# Patient Record
Sex: MALE | Race: WHITE | NOT HISPANIC OR LATINO | Employment: OTHER | URBAN - METROPOLITAN AREA
[De-identification: names, ages, dates, MRNs, and addresses within clinical notes are randomized per-mention and may not be internally consistent; named-entity substitution may affect disease eponyms.]

---

## 2023-06-15 PROBLEM — H52.223 REGULAR ASTIGMATISM OF BOTH EYES WITH PRESBYOPIA: Status: ACTIVE | Noted: 2023-06-15

## 2023-06-15 PROBLEM — N40.0 BENIGN ENLARGEMENT OF PROSTATE: Status: ACTIVE | Noted: 2023-06-15

## 2023-06-15 PROBLEM — E55.9 HYPOVITAMINOSIS D: Status: ACTIVE | Noted: 2023-06-15

## 2023-06-15 PROBLEM — M54.50 LOW BACK PAIN: Status: ACTIVE | Noted: 2023-06-15

## 2023-06-15 PROBLEM — M10.9 GOUT: Status: ACTIVE | Noted: 2023-06-15

## 2023-06-15 PROBLEM — S83.232A COMPLEX TEAR OF MEDIAL MENISCUS OF LEFT KNEE AS CURRENT INJURY: Status: ACTIVE | Noted: 2023-06-15

## 2023-06-15 PROBLEM — M65.30 TRIGGER FINGER: Status: ACTIVE | Noted: 2023-06-15

## 2023-06-15 PROBLEM — M65.312 TRIGGER FINGER OF LEFT THUMB: Status: ACTIVE | Noted: 2023-06-15

## 2023-06-15 PROBLEM — S90.869A TICK BITE OF FOOT: Status: ACTIVE | Noted: 2023-06-15

## 2023-06-15 PROBLEM — R97.20 ELEVATED PROSTATE SPECIFIC ANTIGEN (PSA): Status: ACTIVE | Noted: 2023-06-15

## 2023-06-15 PROBLEM — E78.5 HYPERLIPIDEMIA: Status: ACTIVE | Noted: 2023-06-15

## 2023-06-15 PROBLEM — M19.049 ARTHRITIS, DEGENERATIVE, LOCALIZED, PRIMARY, HAND: Status: ACTIVE | Noted: 2023-06-15

## 2023-06-15 PROBLEM — H57.12 PAIN AROUND LEFT EYE: Status: ACTIVE | Noted: 2023-06-15

## 2023-06-15 PROBLEM — R93.1 ELEVATED CORONARY ARTERY CALCIUM SCORE: Status: ACTIVE | Noted: 2023-06-15

## 2023-06-15 PROBLEM — M65.341 ACQUIRED TRIGGER FINGER OF RIGHT RING FINGER: Status: ACTIVE | Noted: 2023-06-15

## 2023-06-15 PROBLEM — U07.1 COVID-19: Status: ACTIVE | Noted: 2023-06-15

## 2023-06-15 PROBLEM — E11.9 TYPE 2 DIABETES MELLITUS (MULTI): Status: ACTIVE | Noted: 2023-06-15

## 2023-06-15 PROBLEM — R51.9 HEADACHE: Status: ACTIVE | Noted: 2023-06-15

## 2023-06-15 PROBLEM — K21.9 ESOPHAGEAL REFLUX: Status: ACTIVE | Noted: 2023-06-15

## 2023-06-15 PROBLEM — M72.0 DUPUYTREN'S DISEASE: Status: ACTIVE | Noted: 2023-06-15

## 2023-06-15 PROBLEM — N52.9 ED (ERECTILE DYSFUNCTION): Status: ACTIVE | Noted: 2023-06-15

## 2023-06-15 PROBLEM — M65.30 TRIGGER FINGER, ACQUIRED: Status: ACTIVE | Noted: 2023-06-15

## 2023-06-15 PROBLEM — M25.562 KNEE PAIN, LEFT: Status: ACTIVE | Noted: 2023-06-15

## 2023-06-15 PROBLEM — I10 HYPERTENSION: Status: ACTIVE | Noted: 2023-06-15

## 2023-06-15 PROBLEM — H81.10 BPV (BENIGN POSITIONAL VERTIGO): Status: ACTIVE | Noted: 2023-06-15

## 2023-06-15 PROBLEM — H25.13 CATARACT, NUCLEAR SCLEROTIC, BOTH EYES: Status: ACTIVE | Noted: 2023-06-15

## 2023-06-15 PROBLEM — R06.02 SHORTNESS OF BREATH: Status: ACTIVE | Noted: 2023-06-15

## 2023-06-15 PROBLEM — M25.539 WRIST PAIN: Status: ACTIVE | Noted: 2023-06-15

## 2023-06-15 PROBLEM — H04.123 DRY EYE SYNDROME, BILATERAL: Status: ACTIVE | Noted: 2023-06-15

## 2023-06-15 PROBLEM — H43.819 VITREOUS DEGENERATION: Status: ACTIVE | Noted: 2023-06-15

## 2023-06-15 PROBLEM — M19.90 ARTHRITIS: Status: ACTIVE | Noted: 2023-06-15

## 2023-06-15 PROBLEM — M79.646 THUMB PAIN: Status: ACTIVE | Noted: 2023-06-15

## 2023-06-15 PROBLEM — F43.21 GRIEF: Status: ACTIVE | Noted: 2023-06-15

## 2023-06-15 PROBLEM — M79.644 PAIN IN RIGHT FINGER(S): Status: ACTIVE | Noted: 2023-06-15
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
## 2023-06-15 PROBLEM — R49.0 HOARSE VOICE QUALITY: Status: ACTIVE | Noted: 2023-06-15

## 2023-06-15 PROBLEM — M25.561 RIGHT KNEE PAIN: Status: ACTIVE | Noted: 2023-06-15

## 2023-06-15 PROBLEM — M25.641: Status: ACTIVE | Noted: 2023-06-15

## 2023-06-15 PROBLEM — H52.4 REGULAR ASTIGMATISM OF BOTH EYES WITH PRESBYOPIA: Status: ACTIVE | Noted: 2023-06-15

## 2023-06-15 PROBLEM — H52.03 HYPEROPIA OF BOTH EYES: Status: ACTIVE | Noted: 2023-06-15

## 2023-06-15 PROBLEM — H25.10 NUCLEAR SCLEROSIS: Status: ACTIVE | Noted: 2023-06-15

## 2023-06-15 PROBLEM — N18.31 CHRONIC KIDNEY DISEASE, STAGE 3A (MULTI): Status: ACTIVE | Noted: 2023-06-15

## 2023-06-15 RX ORDER — PEN NEEDLE, DIABETIC 31 GX5/16"
NEEDLE, DISPOSABLE MISCELLANEOUS
COMMUNITY
Start: 2023-04-04 | End: 2023-10-27

## 2023-06-15 RX ORDER — INSULIN GLARGINE 300 U/ML
35 INJECTION, SOLUTION SUBCUTANEOUS EVERY MORNING
COMMUNITY
Start: 2017-03-29 | End: 2023-06-20 | Stop reason: SDUPTHER

## 2023-06-15 RX ORDER — AMLODIPINE BESYLATE 10 MG/1
1 TABLET ORAL DAILY
COMMUNITY
Start: 2012-04-30 | End: 2023-06-20 | Stop reason: SDUPTHER

## 2023-06-15 RX ORDER — GABAPENTIN 100 MG/1
1 CAPSULE ORAL NIGHTLY
COMMUNITY
Start: 2021-09-22 | End: 2023-06-20 | Stop reason: ALTCHOICE

## 2023-06-15 RX ORDER — ATENOLOL 50 MG/1
1 TABLET ORAL DAILY
COMMUNITY
Start: 2012-04-30 | End: 2023-06-20 | Stop reason: SDUPTHER

## 2023-06-15 RX ORDER — OMEPRAZOLE 40 MG/1
40 CAPSULE, DELAYED RELEASE ORAL
COMMUNITY
Start: 2017-02-04 | End: 2023-06-20 | Stop reason: SDUPTHER

## 2023-06-15 RX ORDER — ASPIRIN 81 MG/1
1 TABLET ORAL DAILY
COMMUNITY
Start: 2014-12-01 | End: 2023-06-20 | Stop reason: SDUPTHER

## 2023-06-15 RX ORDER — GLIPIZIDE 10 MG/1
2 TABLET, FILM COATED, EXTENDED RELEASE ORAL DAILY
COMMUNITY
Start: 2016-09-21 | End: 2023-06-20 | Stop reason: SDUPTHER

## 2023-06-15 RX ORDER — TAMSULOSIN HYDROCHLORIDE 0.4 MG/1
1 CAPSULE ORAL 2 TIMES DAILY
COMMUNITY
Start: 2012-04-23 | End: 2023-12-04 | Stop reason: SDUPTHER

## 2023-06-15 RX ORDER — VALSARTAN AND HYDROCHLOROTHIAZIDE 160; 12.5 MG/1; MG/1
1 TABLET, FILM COATED ORAL DAILY
COMMUNITY
Start: 2014-05-17 | End: 2023-06-20 | Stop reason: SDUPTHER

## 2023-06-15 RX ORDER — ICOSAPENT ETHYL 1 G/1
2 CAPSULE ORAL
COMMUNITY
Start: 2023-02-15 | End: 2023-06-20 | Stop reason: ALTCHOICE

## 2023-06-15 RX ORDER — GEMFIBROZIL 600 MG/1
1 TABLET, FILM COATED ORAL 2 TIMES DAILY
COMMUNITY
Start: 2013-03-14 | End: 2023-06-20 | Stop reason: SDUPTHER

## 2023-06-15 RX ORDER — IBUPROFEN 200 MG
200 TABLET ORAL EVERY 6 HOURS PRN
COMMUNITY
Start: 2018-03-07 | End: 2024-06-03 | Stop reason: SDUPTHER

## 2023-06-15 RX ORDER — SITAGLIPTIN 100 MG/1
100 TABLET, FILM COATED ORAL DAILY
COMMUNITY
Start: 2012-05-10 | End: 2023-06-20 | Stop reason: SDUPTHER

## 2023-06-15 RX ORDER — BLOOD SUGAR DIAGNOSTIC
1 STRIP MISCELLANEOUS 2 TIMES DAILY
COMMUNITY
Start: 2017-06-07 | End: 2024-06-03 | Stop reason: SDUPTHER

## 2023-06-15 RX ORDER — BIOTIN 10 MG
1 TABLET ORAL DAILY
COMMUNITY
Start: 2017-11-13

## 2023-06-15 RX ORDER — ALBUTEROL SULFATE 90 UG/1
2 AEROSOL, METERED RESPIRATORY (INHALATION) EVERY 6 HOURS PRN
COMMUNITY
Start: 2013-11-30 | End: 2023-06-19 | Stop reason: SDUPTHER

## 2023-06-15 RX ORDER — ALLOPURINOL 300 MG/1
1 TABLET ORAL DAILY
COMMUNITY
Start: 2013-04-10 | End: 2023-06-20 | Stop reason: SDUPTHER

## 2023-06-19 DIAGNOSIS — R06.02 SHORTNESS OF BREATH: ICD-10-CM

## 2023-06-19 RX ORDER — ALBUTEROL SULFATE 90 UG/1
2 AEROSOL, METERED RESPIRATORY (INHALATION) EVERY 6 HOURS PRN
Qty: 18 G | Refills: 2 | Status: SHIPPED | OUTPATIENT
Start: 2023-06-19 | End: 2023-06-20 | Stop reason: SDUPTHER

## 2023-06-20 ENCOUNTER — OFFICE VISIT (OUTPATIENT)
Dept: PRIMARY CARE | Facility: CLINIC | Age: 73
End: 2023-06-20
Payer: MEDICARE

## 2023-06-20 VITALS
BODY MASS INDEX: 28.44 KG/M2 | DIASTOLIC BLOOD PRESSURE: 70 MMHG | WEIGHT: 210 LBS | SYSTOLIC BLOOD PRESSURE: 120 MMHG | HEIGHT: 72 IN

## 2023-06-20 DIAGNOSIS — K21.9 GASTROESOPHAGEAL REFLUX DISEASE WITHOUT ESOPHAGITIS: ICD-10-CM

## 2023-06-20 DIAGNOSIS — M10.9 GOUT, UNSPECIFIED CAUSE, UNSPECIFIED CHRONICITY, UNSPECIFIED SITE: ICD-10-CM

## 2023-06-20 DIAGNOSIS — I10 HYPERTENSION, UNSPECIFIED TYPE: Primary | ICD-10-CM

## 2023-06-20 DIAGNOSIS — Z00.00 WELLNESS EXAMINATION: ICD-10-CM

## 2023-06-20 DIAGNOSIS — R06.02 SHORTNESS OF BREATH: ICD-10-CM

## 2023-06-20 DIAGNOSIS — Z79.4 TYPE 2 DIABETES MELLITUS WITHOUT COMPLICATION, WITH LONG-TERM CURRENT USE OF INSULIN (MULTI): ICD-10-CM

## 2023-06-20 DIAGNOSIS — Z13.6 ENCOUNTER FOR SCREENING FOR ABDOMINAL AORTIC ANEURYSM (AAA) IN PATIENT 50 YEARS OF AGE OR OLDER WITHOUT OTHER RISK FACTORS FOR AAA: ICD-10-CM

## 2023-06-20 DIAGNOSIS — E11.9 TYPE 2 DIABETES MELLITUS WITHOUT COMPLICATION, WITH LONG-TERM CURRENT USE OF INSULIN (MULTI): ICD-10-CM

## 2023-06-20 DIAGNOSIS — N18.31 CHRONIC KIDNEY DISEASE, STAGE 3A (MULTI): ICD-10-CM

## 2023-06-20 LAB — POC HEMOGLOBIN A1C: 5.8 % (ref 4.2–6.5)

## 2023-06-20 PROCEDURE — 3078F DIAST BP <80 MM HG: CPT | Performed by: INTERNAL MEDICINE

## 2023-06-20 PROCEDURE — G0439 PPPS, SUBSEQ VISIT: HCPCS | Performed by: INTERNAL MEDICINE

## 2023-06-20 PROCEDURE — 3044F HG A1C LEVEL LT 7.0%: CPT | Performed by: INTERNAL MEDICINE

## 2023-06-20 PROCEDURE — 3074F SYST BP LT 130 MM HG: CPT | Performed by: INTERNAL MEDICINE

## 2023-06-20 PROCEDURE — 1159F MED LIST DOCD IN RCRD: CPT | Performed by: INTERNAL MEDICINE

## 2023-06-20 PROCEDURE — 99214 OFFICE O/P EST MOD 30 MIN: CPT | Performed by: INTERNAL MEDICINE

## 2023-06-20 PROCEDURE — 83036 HEMOGLOBIN GLYCOSYLATED A1C: CPT | Performed by: INTERNAL MEDICINE

## 2023-06-20 RX ORDER — ATENOLOL 50 MG/1
50 TABLET ORAL DAILY
Qty: 30 TABLET | Refills: 3 | Status: SHIPPED | OUTPATIENT
Start: 2023-06-20 | End: 2023-09-25 | Stop reason: SDUPTHER

## 2023-06-20 RX ORDER — ASPIRIN 81 MG/1
81 TABLET ORAL DAILY
Qty: 30 TABLET | Refills: 3 | Status: SHIPPED | OUTPATIENT
Start: 2023-06-20 | End: 2023-09-25 | Stop reason: SDUPTHER

## 2023-06-20 RX ORDER — AMLODIPINE BESYLATE 10 MG/1
10 TABLET ORAL DAILY
Qty: 30 TABLET | Refills: 3 | Status: SHIPPED | OUTPATIENT
Start: 2023-06-20 | End: 2023-09-25 | Stop reason: SDUPTHER

## 2023-06-20 RX ORDER — GEMFIBROZIL 600 MG/1
600 TABLET, FILM COATED ORAL 2 TIMES DAILY
Qty: 30 TABLET | Refills: 3 | Status: SHIPPED | OUTPATIENT
Start: 2023-06-20 | End: 2023-09-25 | Stop reason: SDUPTHER

## 2023-06-20 RX ORDER — SITAGLIPTIN 100 MG/1
100 TABLET, FILM COATED ORAL DAILY
Qty: 30 TABLET | Refills: 3 | Status: SHIPPED | OUTPATIENT
Start: 2023-06-20 | End: 2023-09-26 | Stop reason: SDUPTHER

## 2023-06-20 RX ORDER — OMEPRAZOLE 40 MG/1
40 CAPSULE, DELAYED RELEASE ORAL
Qty: 30 CAPSULE | Refills: 3 | Status: SHIPPED | OUTPATIENT
Start: 2023-06-20 | End: 2023-09-25 | Stop reason: SDUPTHER

## 2023-06-20 RX ORDER — ALBUTEROL SULFATE 90 UG/1
2 AEROSOL, METERED RESPIRATORY (INHALATION) EVERY 6 HOURS PRN
Qty: 18 G | Refills: 2 | Status: SHIPPED | OUTPATIENT
Start: 2023-06-20 | End: 2024-06-03 | Stop reason: SDUPTHER

## 2023-06-20 RX ORDER — ALLOPURINOL 300 MG/1
300 TABLET ORAL DAILY
Qty: 30 TABLET | Refills: 3 | Status: SHIPPED | OUTPATIENT
Start: 2023-06-20 | End: 2023-09-25 | Stop reason: SDUPTHER

## 2023-06-20 RX ORDER — GLIPIZIDE 10 MG/1
20 TABLET, FILM COATED, EXTENDED RELEASE ORAL DAILY
Qty: 30 TABLET | Refills: 3 | Status: SHIPPED | OUTPATIENT
Start: 2023-06-20 | End: 2023-09-25 | Stop reason: SDUPTHER

## 2023-06-20 RX ORDER — VALSARTAN AND HYDROCHLOROTHIAZIDE 160; 12.5 MG/1; MG/1
1 TABLET, FILM COATED ORAL DAILY
Qty: 30 TABLET | Refills: 3 | Status: SHIPPED | OUTPATIENT
Start: 2023-06-20 | End: 2023-09-25 | Stop reason: SDUPTHER

## 2023-06-20 RX ORDER — INSULIN GLARGINE 300 U/ML
35 INJECTION, SOLUTION SUBCUTANEOUS EVERY MORNING
Qty: 1.5 ML | Refills: 3 | Status: SHIPPED | OUTPATIENT
Start: 2023-06-20 | End: 2023-08-09 | Stop reason: SDUPTHER

## 2023-06-20 ASSESSMENT — PATIENT HEALTH QUESTIONNAIRE - PHQ9
SUM OF ALL RESPONSES TO PHQ9 QUESTIONS 1 AND 2: 2
1. LITTLE INTEREST OR PLEASURE IN DOING THINGS: SEVERAL DAYS
2. FEELING DOWN, DEPRESSED OR HOPELESS: SEVERAL DAYS

## 2023-06-20 NOTE — PROGRESS NOTES
I reviewed with the resident the medical history and the resident’s findings on physical examination.  I discussed with the resident the patient’s diagnosis and concur with the treatment plan as documented in the resident note.     I saw and evaluated the patient. I personally obtained the key and critical portions of the history and physical exam or was physically present for key and critical portions performed by the trainee. I reviewed the trainee's documentation and discussed the patient with the trainee. I agree with the trainee's medical decision making, as documented on the trainee's note.     Very rude, doesn't want to schedule a follow up, refuses recommendations.  I recommend that he find another PCP.      Roselia Ibrahim MD

## 2023-08-09 DIAGNOSIS — Z79.4 TYPE 2 DIABETES MELLITUS WITHOUT COMPLICATION, WITH LONG-TERM CURRENT USE OF INSULIN (MULTI): ICD-10-CM

## 2023-08-09 DIAGNOSIS — E11.9 TYPE 2 DIABETES MELLITUS WITHOUT COMPLICATION, WITH LONG-TERM CURRENT USE OF INSULIN (MULTI): ICD-10-CM

## 2023-08-09 RX ORDER — INSULIN GLARGINE 300 U/ML
35 INJECTION, SOLUTION SUBCUTANEOUS EVERY MORNING
Qty: 3.6 ML | Refills: 2 | Status: SHIPPED | OUTPATIENT
Start: 2023-08-09 | End: 2023-08-10 | Stop reason: SDUPTHER

## 2023-08-10 DIAGNOSIS — E11.9 TYPE 2 DIABETES MELLITUS WITHOUT COMPLICATION, WITH LONG-TERM CURRENT USE OF INSULIN (MULTI): ICD-10-CM

## 2023-08-10 DIAGNOSIS — Z79.4 TYPE 2 DIABETES MELLITUS WITHOUT COMPLICATION, WITH LONG-TERM CURRENT USE OF INSULIN (MULTI): ICD-10-CM

## 2023-08-10 RX ORDER — INSULIN GLARGINE 300 U/ML
35 INJECTION, SOLUTION SUBCUTANEOUS EVERY MORNING
Qty: 4.5 ML | Refills: 0 | Status: SHIPPED | OUTPATIENT
Start: 2023-08-10 | End: 2023-08-14 | Stop reason: SDUPTHER

## 2023-08-14 DIAGNOSIS — E11.9 TYPE 2 DIABETES MELLITUS WITHOUT COMPLICATION, WITH LONG-TERM CURRENT USE OF INSULIN (MULTI): ICD-10-CM

## 2023-08-14 DIAGNOSIS — Z79.4 TYPE 2 DIABETES MELLITUS WITHOUT COMPLICATION, WITH LONG-TERM CURRENT USE OF INSULIN (MULTI): ICD-10-CM

## 2023-08-14 RX ORDER — INSULIN GLARGINE 300 U/ML
45 INJECTION, SOLUTION SUBCUTANEOUS EVERY MORNING
Qty: 4.5 ML | Refills: 0 | Status: SHIPPED | OUTPATIENT
Start: 2023-08-14 | End: 2023-09-11 | Stop reason: SDUPTHER

## 2023-08-17 NOTE — PROGRESS NOTES
Subjective   Reason for Visit: Edward D Hume is an 73 y.o. male here for a Medicare Wellness visit.          Reviewed all medications by prescribing practitioner or clinical pharmacist (such as prescriptions, OTCs, herbal therapies and supplements) and documented in the medical record.    Med Refill      Doing well overall. No acute complaints.   Denies chest pain, shortness of breath worse during allergy season.   Will make eye apt on his own.     Patient Care Team:  Roselia Ibrahim MD as PCP - General  Roselia Ibrahim MD as PCP - [facility]     Review of Systems   All other systems reviewed and are negative.      Objective   Vitals:  /70   Ht 1.829 m (6')   Wt 95.3 kg (210 lb)   BMI 28.48 kg/m²       Physical Exam  Vitals reviewed.   Cardiovascular:      Rate and Rhythm: Normal rate and regular rhythm.   Pulmonary:      Effort: Pulmonary effort is normal.      Breath sounds: Normal breath sounds.   Abdominal:      General: Abdomen is flat. Bowel sounds are normal.      Palpations: Abdomen is soft.         Assessment/Plan   Problem List Items Addressed This Visit          Respiratory    Shortness of breath    Relevant Medications    albuterol 90 mcg/actuation inhaler       Circulatory    Hypertension - Primary    Relevant Medications    amLODIPine (Norvasc) 10 mg tablet    aspirin 81 mg EC tablet    atenolol (Tenormin) 50 mg tablet    gemfibrozil (Lopid) 600 mg tablet    valsartan-hydrochlorothiazide (Diovan-HCT) 160-12.5 mg tablet    Other Relevant Orders    Comprehensive Metabolic Panel       Digestive    Esophageal reflux    Relevant Medications    omeprazole (PriLOSEC) 40 mg DR capsule       Genitourinary    Chronic kidney disease, stage 3a       Endocrine/Metabolic    Type 2 diabetes mellitus (CMS/[facility])    Relevant Medications    glipiZIDE XL (Glucotrol XL) 10 mg 24 hr tablet    Januvia 100 mg tablet    Toujeo SoloStar U-300 Insulin 300 unit/mL (1.5 mL) injection        Other    Gout    Relevant Medications    allopurinol (Zyloprim) 300 mg tablet    Wellness examination     Other Visit Diagnoses       Encounter for screening for abdominal aortic aneurysm (AAA) in patient 50 years of age or older without other risk factors for AAA        Relevant Orders    Vascular US abdominal aorta anuerysm AAA screening          BPH   -refused flomax     RTC in 6months. Patient will make his own appt         No change

## 2023-09-11 DIAGNOSIS — E11.9 TYPE 2 DIABETES MELLITUS WITHOUT COMPLICATION, WITH LONG-TERM CURRENT USE OF INSULIN (MULTI): ICD-10-CM

## 2023-09-11 DIAGNOSIS — Z79.4 TYPE 2 DIABETES MELLITUS WITHOUT COMPLICATION, WITH LONG-TERM CURRENT USE OF INSULIN (MULTI): ICD-10-CM

## 2023-09-11 RX ORDER — INSULIN GLARGINE 300 U/ML
45 INJECTION, SOLUTION SUBCUTANEOUS EVERY MORNING
Qty: 13.5 ML | Refills: 0 | Status: SHIPPED | OUTPATIENT
Start: 2023-09-11 | End: 2024-01-31 | Stop reason: SDUPTHER

## 2023-09-25 ENCOUNTER — LAB (OUTPATIENT)
Dept: LAB | Facility: LAB | Age: 73
End: 2023-09-25
Payer: MEDICARE

## 2023-09-25 ENCOUNTER — OFFICE VISIT (OUTPATIENT)
Dept: PRIMARY CARE | Facility: CLINIC | Age: 73
End: 2023-09-25
Payer: MEDICARE

## 2023-09-25 VITALS
SYSTOLIC BLOOD PRESSURE: 151 MMHG | DIASTOLIC BLOOD PRESSURE: 78 MMHG | HEART RATE: 68 BPM | WEIGHT: 211 LBS | BODY MASS INDEX: 28.62 KG/M2

## 2023-09-25 DIAGNOSIS — E11.9 TYPE 2 DIABETES MELLITUS WITHOUT COMPLICATION, WITH LONG-TERM CURRENT USE OF INSULIN (MULTI): ICD-10-CM

## 2023-09-25 DIAGNOSIS — R93.1 ELEVATED CORONARY ARTERY CALCIUM SCORE: Primary | ICD-10-CM

## 2023-09-25 DIAGNOSIS — E78.5 HYPERLIPIDEMIA, UNSPECIFIED HYPERLIPIDEMIA TYPE: ICD-10-CM

## 2023-09-25 DIAGNOSIS — Z23 NEED FOR IMMUNIZATION AGAINST INFLUENZA: ICD-10-CM

## 2023-09-25 DIAGNOSIS — M25.512 BILATERAL SHOULDER PAIN, UNSPECIFIED CHRONICITY: ICD-10-CM

## 2023-09-25 DIAGNOSIS — E55.9 HYPOVITAMINOSIS D: ICD-10-CM

## 2023-09-25 DIAGNOSIS — I10 HYPERTENSION, UNSPECIFIED TYPE: ICD-10-CM

## 2023-09-25 DIAGNOSIS — R93.1 ELEVATED CORONARY ARTERY CALCIUM SCORE: ICD-10-CM

## 2023-09-25 DIAGNOSIS — K21.9 GASTROESOPHAGEAL REFLUX DISEASE WITHOUT ESOPHAGITIS: ICD-10-CM

## 2023-09-25 DIAGNOSIS — Z79.4 TYPE 2 DIABETES MELLITUS WITHOUT COMPLICATION, WITH LONG-TERM CURRENT USE OF INSULIN (MULTI): ICD-10-CM

## 2023-09-25 DIAGNOSIS — M25.511 BILATERAL SHOULDER PAIN, UNSPECIFIED CHRONICITY: ICD-10-CM

## 2023-09-25 DIAGNOSIS — M10.9 GOUT, UNSPECIFIED CAUSE, UNSPECIFIED CHRONICITY, UNSPECIFIED SITE: ICD-10-CM

## 2023-09-25 LAB
CALCIDIOL (25 OH VITAMIN D3) (NG/ML) IN SER/PLAS: 43 NG/ML
ERYTHROCYTE DISTRIBUTION WIDTH (RATIO) BY AUTOMATED COUNT: 12.6 % (ref 11.5–14.5)
ERYTHROCYTE MEAN CORPUSCULAR HEMOGLOBIN CONCENTRATION (G/DL) BY AUTOMATED: 34.3 G/DL (ref 32–36)
ERYTHROCYTE MEAN CORPUSCULAR VOLUME (FL) BY AUTOMATED COUNT: 94 FL (ref 80–100)
ERYTHROCYTES (10*6/UL) IN BLOOD BY AUTOMATED COUNT: 4.11 X10E12/L (ref 4.5–5.9)
HEMATOCRIT (%) IN BLOOD BY AUTOMATED COUNT: 38.8 % (ref 41–52)
HEMOGLOBIN (G/DL) IN BLOOD: 13.3 G/DL (ref 13.5–17.5)
LEUKOCYTES (10*3/UL) IN BLOOD BY AUTOMATED COUNT: 4.9 X10E9/L (ref 4.4–11.3)
NRBC (PER 100 WBCS) BY AUTOMATED COUNT: 0 /100 WBC (ref 0–0)
PLATELETS (10*3/UL) IN BLOOD AUTOMATED COUNT: 176 X10E9/L (ref 150–450)
THYROTROPIN (MIU/L) IN SER/PLAS BY DETECTION LIMIT <= 0.05 MIU/L: 1.29 MIU/L (ref 0.44–3.98)

## 2023-09-25 PROCEDURE — 84443 ASSAY THYROID STIM HORMONE: CPT

## 2023-09-25 PROCEDURE — 1159F MED LIST DOCD IN RCRD: CPT | Performed by: INTERNAL MEDICINE

## 2023-09-25 PROCEDURE — 99214 OFFICE O/P EST MOD 30 MIN: CPT | Performed by: INTERNAL MEDICINE

## 2023-09-25 PROCEDURE — 90662 IIV NO PRSV INCREASED AG IM: CPT | Performed by: INTERNAL MEDICINE

## 2023-09-25 PROCEDURE — 86803 HEPATITIS C AB TEST: CPT

## 2023-09-25 PROCEDURE — 83036 HEMOGLOBIN GLYCOSYLATED A1C: CPT

## 2023-09-25 PROCEDURE — 3077F SYST BP >= 140 MM HG: CPT | Performed by: INTERNAL MEDICINE

## 2023-09-25 PROCEDURE — 3044F HG A1C LEVEL LT 7.0%: CPT | Performed by: INTERNAL MEDICINE

## 2023-09-25 PROCEDURE — 80053 COMPREHEN METABOLIC PANEL: CPT

## 2023-09-25 PROCEDURE — 80061 LIPID PANEL: CPT

## 2023-09-25 PROCEDURE — G0008 ADMIN INFLUENZA VIRUS VAC: HCPCS | Performed by: INTERNAL MEDICINE

## 2023-09-25 PROCEDURE — 82570 ASSAY OF URINE CREATININE: CPT

## 2023-09-25 PROCEDURE — 36415 COLL VENOUS BLD VENIPUNCTURE: CPT

## 2023-09-25 PROCEDURE — 85027 COMPLETE CBC AUTOMATED: CPT

## 2023-09-25 PROCEDURE — 82043 UR ALBUMIN QUANTITATIVE: CPT

## 2023-09-25 PROCEDURE — 3078F DIAST BP <80 MM HG: CPT | Performed by: INTERNAL MEDICINE

## 2023-09-25 PROCEDURE — 82306 VITAMIN D 25 HYDROXY: CPT

## 2023-09-25 RX ORDER — ATENOLOL 50 MG/1
50 TABLET ORAL DAILY
Qty: 30 TABLET | Refills: 4 | Status: SHIPPED | OUTPATIENT
Start: 2023-09-25 | End: 2024-02-12 | Stop reason: SDUPTHER

## 2023-09-25 RX ORDER — AMLODIPINE BESYLATE 10 MG/1
10 TABLET ORAL DAILY
Qty: 30 TABLET | Refills: 4 | Status: SHIPPED | OUTPATIENT
Start: 2023-09-25 | End: 2024-02-02 | Stop reason: SDUPTHER

## 2023-09-25 RX ORDER — VALSARTAN AND HYDROCHLOROTHIAZIDE 160; 12.5 MG/1; MG/1
1 TABLET, FILM COATED ORAL DAILY
Qty: 30 TABLET | Refills: 4 | Status: SHIPPED | OUTPATIENT
Start: 2023-09-25 | End: 2024-02-12 | Stop reason: SDUPTHER

## 2023-09-25 RX ORDER — ALLOPURINOL 300 MG/1
300 TABLET ORAL DAILY
Qty: 30 TABLET | Refills: 4 | Status: SHIPPED | OUTPATIENT
Start: 2023-09-25 | End: 2024-02-12 | Stop reason: SDUPTHER

## 2023-09-25 RX ORDER — ASPIRIN 81 MG/1
81 TABLET ORAL DAILY
Qty: 30 TABLET | Refills: 4 | Status: SHIPPED | OUTPATIENT
Start: 2023-09-25 | End: 2024-02-12 | Stop reason: SDUPTHER

## 2023-09-25 RX ORDER — GEMFIBROZIL 600 MG/1
600 TABLET, FILM COATED ORAL 2 TIMES DAILY
Qty: 30 TABLET | Refills: 4 | Status: SHIPPED | OUTPATIENT
Start: 2023-09-25 | End: 2024-02-12 | Stop reason: SDUPTHER

## 2023-09-25 RX ORDER — GLIPIZIDE 10 MG/1
20 TABLET, FILM COATED, EXTENDED RELEASE ORAL DAILY
Qty: 30 TABLET | Refills: 4 | Status: SHIPPED | OUTPATIENT
Start: 2023-09-25 | End: 2023-12-04

## 2023-09-25 RX ORDER — OMEPRAZOLE 40 MG/1
40 CAPSULE, DELAYED RELEASE ORAL
Qty: 30 CAPSULE | Refills: 4 | Status: SHIPPED | OUTPATIENT
Start: 2023-09-25 | End: 2024-02-12 | Stop reason: SDUPTHER

## 2023-09-25 RX ORDER — FAMOTIDINE 20 MG/1
1 TABLET, FILM COATED ORAL NIGHTLY
COMMUNITY
Start: 2023-08-31 | End: 2023-09-25 | Stop reason: SDUPTHER

## 2023-09-25 RX ORDER — FAMOTIDINE 20 MG/1
20 TABLET, FILM COATED ORAL NIGHTLY
Qty: 30 TABLET | Refills: 4 | Status: SHIPPED | OUTPATIENT
Start: 2023-09-25 | End: 2024-02-12 | Stop reason: SDUPTHER

## 2023-09-25 ASSESSMENT — PATIENT HEALTH QUESTIONNAIRE - PHQ9
2. FEELING DOWN, DEPRESSED OR HOPELESS: SEVERAL DAYS
1. LITTLE INTEREST OR PLEASURE IN DOING THINGS: SEVERAL DAYS
SUM OF ALL RESPONSES TO PHQ9 QUESTIONS 1 AND 2: 2

## 2023-09-25 ASSESSMENT — ENCOUNTER SYMPTOMS
ARTHRALGIAS: 1
RESPIRATORY NEGATIVE: 1
CONSTITUTIONAL NEGATIVE: 1
GASTROINTESTINAL NEGATIVE: 1
CARDIOVASCULAR NEGATIVE: 1

## 2023-09-25 NOTE — PROGRESS NOTES
Subjective   Patient ID: Edward D Hume is a 73 y.o. male who presents for Follow-up.  HPI    Review of Systems   Constitutional: Negative.    Respiratory: Negative.     Cardiovascular: Negative.    Gastrointestinal: Negative.    Musculoskeletal:  Positive for arthralgias.       Objective   Physical Exam  Neurological:      Mental Status: He is alert.   Psychiatric:         Mood and Affect: Mood normal.         Assessment/Plan   Problem List Items Addressed This Visit       Elevated coronary artery calcium score - Primary    Relevant Medications    amLODIPine (Norvasc) 10 mg tablet    atenolol (Tenormin) 50 mg tablet    Other Relevant Orders    CBC    Comprehensive Metabolic Panel    TSH with reflex to Free T4 if abnormal    Hemoglobin A1C    Lipid Panel    Vitamin D 25-Hydroxy,Total (for eval of Vitamin D levels)    Albumin, urine, random    Hepatitis C antibody    Esophageal reflux    Relevant Medications    omeprazole (PriLOSEC) 40 mg DR capsule    famotidine (Pepcid) 20 mg tablet    Gout    Relevant Medications    allopurinol (Zyloprim) 300 mg tablet    Hyperlipidemia    Relevant Orders    CBC    Comprehensive Metabolic Panel    TSH with reflex to Free T4 if abnormal    Hemoglobin A1C    Lipid Panel    Vitamin D 25-Hydroxy,Total (for eval of Vitamin D levels)    Albumin, urine, random    Hepatitis C antibody    Hypertension    Relevant Medications    amLODIPine (Norvasc) 10 mg tablet    aspirin 81 mg EC tablet    atenolol (Tenormin) 50 mg tablet    gemfibrozil (Lopid) 600 mg tablet    valsartan-hydrochlorothiazide (Diovan-HCT) 160-12.5 mg tablet    Type 2 diabetes mellitus (CMS/HCC)    Relevant Medications    glipiZIDE XL (Glucotrol XL) 10 mg 24 hr tablet    Other Relevant Orders    Hemoglobin A1C    Hypovitaminosis D    Relevant Orders    Vitamin D 25-Hydroxy,Total (for eval of Vitamin D levels)     Other Visit Diagnoses       Need for immunization against influenza        Relevant Orders    Flu vaccine,  "quadrivalent, high-dose, preservative free, age 65y+ (FLUZONE)    Bilateral shoulder pain, unspecified chronicity        Relevant Orders    XR shoulder right 2+ views    XR shoulder left 2+ views    Referral to Physical Therapy             HTN.  Well controlled.  Continue with current medications.  Check BP at home daily.  Report to us if the numbers are higher than 130/80.       Diabetes Mellitus type II, under good  control.  1. Rx changes none  2. Education: Reviewed ‘ABCs’ of diabetes management (respective goals in parentheses):  A1C (<7), blood pressure (<130/80), and cholesterol (LDL <100).  3. Compliance at present is estimated to be good. Efforts to improve compliance were discussed.  4. Follow up in 3 months          HLD  Stable  Continue with statins  Check lipids    Bilateral shoulder pain  Recommend Xrays of both shoulders  PT may help as well    Due for complete BW today  Doesn't want to do AA screen at this point    Lab Results   Component Value Date    WBC 5.5 02/13/2023    HGB 13.9 02/13/2023    HCT 41.7 02/13/2023     02/13/2023    CHOL 157 02/13/2023    TRIG 197 (H) 02/13/2023    HDL 31.5 (A) 02/13/2023    ALT 13 02/13/2023    AST 14 02/13/2023     02/13/2023    K 4.3 02/13/2023     02/13/2023    CREATININE 1.33 (H) 02/13/2023    BUN 29 (H) 02/13/2023    CO2 26 02/13/2023    TSH 1.66 02/13/2023    PSA 9.67 (H) 09/22/2021    HGBA1C 5.8 06/20/2023     Par    Lab Results   Component Value Date    CHOL 157 02/13/2023    CHOL 131 06/13/2022    CHOL 149 09/22/2021     Lab Results   Component Value Date    HDL 31.5 (A) 02/13/2023    HDL 30.0 (A) 06/13/2022    HDL 25.5 (A) 09/22/2021     No results found for: \"LDLCALC\"  Lab Results   Component Value Date    TRIG 197 (H) 02/13/2023    TRIG 218 (H) 06/13/2022    TRIG 198 (H) 09/22/2021       f1 No components found for: \"CHOLHDL\"     [facility]  1) COMPLEXITY: MORE THAN 1 STABLE CHRONIC CONDITION ADDRESSED OR 1 ACUTE ILLNESS ADDRESSED   2)DATA: " TESTS INTERPRETED AND OR ORDERED, TOOK INDEPENDENT HISTORY OR RECORDS REVIEWED  3)RISK: MODERATE RISK DUE TO NATURE OF MEDICAL CONDITIONS/COMORBIDITY OR MEDICATIONS ORDERED OR SURGICAL OR PROCEDURE REFERRAL      Roselia Ibrahim MD

## 2023-09-26 DIAGNOSIS — Z79.4 TYPE 2 DIABETES MELLITUS WITHOUT COMPLICATION, WITH LONG-TERM CURRENT USE OF INSULIN (MULTI): ICD-10-CM

## 2023-09-26 DIAGNOSIS — E11.9 TYPE 2 DIABETES MELLITUS WITHOUT COMPLICATION, WITH LONG-TERM CURRENT USE OF INSULIN (MULTI): ICD-10-CM

## 2023-09-26 LAB
ALANINE AMINOTRANSFERASE (SGPT) (U/L) IN SER/PLAS: 13 U/L (ref 10–52)
ALBUMIN (G/DL) IN SER/PLAS: 4.5 G/DL (ref 3.4–5)
ALBUMIN (MG/L) IN URINE: 188.8 MG/L
ALBUMIN/CREATININE (UG/MG) IN URINE: 181.5 UG/MG CRT (ref 0–30)
ALKALINE PHOSPHATASE (U/L) IN SER/PLAS: 55 U/L (ref 33–136)
ANION GAP IN SER/PLAS: 13 MMOL/L (ref 10–20)
ASPARTATE AMINOTRANSFERASE (SGOT) (U/L) IN SER/PLAS: 15 U/L (ref 9–39)
BILIRUBIN TOTAL (MG/DL) IN SER/PLAS: 0.5 MG/DL (ref 0–1.2)
CALCIUM (MG/DL) IN SER/PLAS: 9.2 MG/DL (ref 8.6–10.6)
CARBON DIOXIDE, TOTAL (MMOL/L) IN SER/PLAS: 26 MMOL/L (ref 21–32)
CHLORIDE (MMOL/L) IN SER/PLAS: 107 MMOL/L (ref 98–107)
CHOLESTEROL (MG/DL) IN SER/PLAS: 150 MG/DL (ref 0–199)
CHOLESTEROL IN HDL (MG/DL) IN SER/PLAS: 28.1 MG/DL
CHOLESTEROL/HDL RATIO: 5.3
CREATININE (MG/DL) IN SER/PLAS: 1.37 MG/DL (ref 0.5–1.3)
CREATININE (MG/DL) IN URINE: 104 MG/DL (ref 20–370)
ESTIMATED AVERAGE GLUCOSE FOR HBA1C: 111 MG/DL
GFR MALE: 55 ML/MIN/1.73M2
GLUCOSE (MG/DL) IN SER/PLAS: 102 MG/DL (ref 74–99)
HEMOGLOBIN A1C/HEMOGLOBIN TOTAL IN BLOOD: 5.5 %
HEPATITIS C VIRUS AB PRESENCE IN SERUM: NONREACTIVE
LDL: 61 MG/DL (ref 0–99)
NON HDL CHOLESTEROL: 122 MG/DL
POTASSIUM (MMOL/L) IN SER/PLAS: 4 MMOL/L (ref 3.5–5.3)
PROTEIN TOTAL: 7 G/DL (ref 6.4–8.2)
SODIUM (MMOL/L) IN SER/PLAS: 142 MMOL/L (ref 136–145)
TRIGLYCERIDE (MG/DL) IN SER/PLAS: 303 MG/DL (ref 0–149)
UREA NITROGEN (MG/DL) IN SER/PLAS: 25 MG/DL (ref 6–23)
VLDL: 61 MG/DL (ref 0–40)

## 2023-09-26 RX ORDER — SITAGLIPTIN 100 MG/1
100 TABLET, FILM COATED ORAL DAILY
Qty: 30 TABLET | Refills: 3 | Status: SHIPPED | OUTPATIENT
Start: 2023-09-26 | End: 2024-02-12 | Stop reason: SDUPTHER

## 2023-10-09 NOTE — PROGRESS NOTES
AUDIOLOGY ADULT AUDIOMETRIC EVALUATION      Name:  Edward D Hume  :  1950  Age:  73 y.o.  Date of Evaluation:  10/10/2023    IMPRESSIONS     Today's test results are consistent with normal hearing sensitivity from 125-2000 Hz sloping to an essentially moderate SNHL, bilaterally. Discussed results and recommendations with patient.  Questions were addressed and the patient was encouraged to contact our department should concerns arise.    RECOMMENDATIONS     Continue medical follow up with PCP/ENT.  Return for annual hearing evaluation or sooner should concerns arise.  Consider amplification options to address concerns for tinnitus. Discussed difference between amplifiers and hearing aids during appointment.    Time: 3004-0709      HISTORY     Reason for visit:  Edward D Hume is seen today at the request of Santo Bang MD for an evaluation of hearing.  Patient complains of bilateral constant tinnitus, which has increased within the past year. 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 Patient denied history of hearing aid use.      EVALUATION     See Audiogram    TEST RESULTS     Otoscopic Evaluation:  Right Ear: Clear ear canals.  Left Ear: Clear ear canals.    Tympanometry & Acoustic Reflexes:  Right Ear: Middle ear pressure and eardrm mobility within defined limits. Unable to perform Acoustic Reflex Testing due to inability to maintain a hermetic seal.  Left Ear: Middle ear pressure and eardrm mobility within defined limits. Unable to perform Acoustic Reflex Testing due to inability to maintain a hermetic seal.    Pure Tone Audiometry:    Right Ear: Normal hearing sensitivity from 125-2000 Hz sloping to a moderate SNHL.  Left Ear: Normal hearing sensitivity from 125-2000 Hz sloping to a moderate SNHL (of note, borderline moderately-severe SNHL threshold at 4000 Hz only).    Speech Audiometry:    Right Ear:  Speech Reception Threshold (SRT) was obtained at 10 dBHL. Word Recognition scores were excellent in quiet when words were presented at 60 dBHL.  Left Ear:  Speech Reception Threshold (SRT) was obtained at 10 dBHL. Word Recognition scores were excellent in quiet when words were presented at 60 dBHL.    Distortion Product Otoacoustic Emissions:  DNT.    Testing and interpretation of results completed SYL Lima, CCC-A. It was my pleasure to evaluate this patient.       SYL Lima, CCC-A  Licensed Audiologist      Degree of Hearing Sensitivity Decibel Range   Within Normal Limits (WNL) 0-25   Mild 26-40   Moderate 41-55   Moderately-Severe 56-70   Severe 71-90   Profound 91+      Key   CNT/DNT Could Not Test/Did Not Test   TM Tympanic Membrane   WNL Within Normal Limits   HA Hearing Aid   SNHL Sensorineural Hearing Loss   CHL Conductive Hearing Loss   NIHL Noise-Induced Hearing Loss   ECV Ear Canal Volume   MLV Monitored Live Voice

## 2023-10-10 ENCOUNTER — CLINICAL SUPPORT (OUTPATIENT)
Dept: AUDIOLOGY | Facility: CLINIC | Age: 73
End: 2023-10-10
Payer: MEDICARE

## 2023-10-10 ENCOUNTER — OFFICE VISIT (OUTPATIENT)
Dept: ORTHOPEDIC SURGERY | Facility: CLINIC | Age: 73
End: 2023-10-10
Payer: MEDICARE

## 2023-10-10 DIAGNOSIS — M65.342 TRIGGER RING FINGER OF LEFT HAND: Primary | ICD-10-CM

## 2023-10-10 DIAGNOSIS — H90.3 SENSORINEURAL HEARING LOSS (SNHL) OF BOTH EARS: Primary | ICD-10-CM

## 2023-10-10 PROCEDURE — 20550 NJX 1 TENDON SHEATH/LIGAMENT: CPT | Performed by: ORTHOPAEDIC SURGERY

## 2023-10-10 PROCEDURE — 20550 NJX 1 TENDON SHEATH/LIGAMENT: CPT | Mod: F3 | Performed by: ORTHOPAEDIC SURGERY

## 2023-10-10 PROCEDURE — 92567 TYMPANOMETRY: CPT

## 2023-10-10 PROCEDURE — 92557 COMPREHENSIVE HEARING TEST: CPT

## 2023-10-10 PROCEDURE — 1036F TOBACCO NON-USER: CPT | Performed by: ORTHOPAEDIC SURGERY

## 2023-10-10 PROCEDURE — 2500000005 HC RX 250 GENERAL PHARMACY W/O HCPCS: Performed by: ORTHOPAEDIC SURGERY

## 2023-10-10 PROCEDURE — 2500000004 HC RX 250 GENERAL PHARMACY W/ HCPCS (ALT 636 FOR OP/ED): Performed by: ORTHOPAEDIC SURGERY

## 2023-10-10 PROCEDURE — 99213 OFFICE O/P EST LOW 20 MIN: CPT | Performed by: ORTHOPAEDIC SURGERY

## 2023-10-10 PROCEDURE — 3044F HG A1C LEVEL LT 7.0%: CPT | Performed by: ORTHOPAEDIC SURGERY

## 2023-10-10 PROCEDURE — 1159F MED LIST DOCD IN RCRD: CPT | Performed by: ORTHOPAEDIC SURGERY

## 2023-10-10 PROCEDURE — 99213 OFFICE O/P EST LOW 20 MIN: CPT | Mod: 25 | Performed by: ORTHOPAEDIC SURGERY

## 2023-10-10 RX ORDER — LIDOCAINE HYDROCHLORIDE 10 MG/ML
0.5 INJECTION INFILTRATION; PERINEURAL
Status: COMPLETED | OUTPATIENT
Start: 2023-10-10 | End: 2023-10-10

## 2023-10-10 RX ORDER — TRIAMCINOLONE ACETONIDE 40 MG/ML
20 INJECTION, SUSPENSION INTRA-ARTICULAR; INTRAMUSCULAR
Status: COMPLETED | OUTPATIENT
Start: 2023-10-10 | End: 2023-10-10

## 2023-10-10 RX ADMIN — TRIAMCINOLONE ACETONIDE 20 MG: 40 INJECTION, SUSPENSION INTRA-ARTICULAR; INTRAMUSCULAR at 13:24

## 2023-10-10 RX ADMIN — LIDOCAINE HYDROCHLORIDE 0.5 ML: 10 INJECTION, SOLUTION INFILTRATION; PERINEURAL at 13:24

## 2023-10-10 ASSESSMENT — ENCOUNTER SYMPTOMS: OCCASIONAL FEELINGS OF UNSTEADINESS: 0

## 2023-10-10 NOTE — LETTER
October 10, 2023     Roselia Ibrahim MD  [address]  [facility] [address]  [facility] [address]    Patient: Edward D Hume   YOB: 1950   Date of Visit: 10/10/2023       Dear Dr. Roselia Ibrahim MD:    Thank you for referring Edward Hume to me for evaluation. Below are my notes for this consultation.  If you have questions, please do not hesitate to call me. I look forward to following your patient along with you.       Sincerely,     Subhash Wolfe MD      CC: No Recipients  ______________________________________________________________________________________

## 2023-10-10 NOTE — PROGRESS NOTES
CHIEF COMPLAINT         Trigger finger injection.    ASSESSMENT + PLAN    Left ring trigger finger, recurrent after remote injections    The nature of trigger finger was reviewed, along with the natural history.  I reviewed the options for management, including observation, nonsteroidals, splinting, oral steroids, cortisone injection, or surgical release, along with the likely success rates of each.  I reviewed the risks of cortisone injection, including infection, hypopigmentation, and fat atrophy.  I cautioned the patient to avoid hot objects where the finger could be burned, if it becomes insensate temporarily from the lidocaine. The transient cortisone effect on blood glucose measurement was also reviewed, and the patient wished to proceed.    After sterile prep, I injected 20 mg of Kenalog and 0.5 cc of 1% lidocaine, plain to the flexor sheath of the left ring finger.    Patient will followup in 4 weeks if still symptomatic, or with any concerns.        HISTORY OF PRESENT ILLNESS       Patient returns today, about 10 months after last visit with recurrent popping and clicking of his left ring trigger finger.  He had his usual good response to the previous injection.  Symptoms have been back for about a month.  No interval trauma.  No numbness or tingling.  No other bothersome digits.  He is here requesting another injection today.      PHYSICAL EXAM       He remains well-developed, well-nourished male in no acute distress.  He appears his stated age and has a pleasant affect.  Skin of the left ring finger and hand remains intact with no erythema, ecchymosis, or diffuse swelling.  Normal skin drag and coloration.  Full composite finger flexion extension but obvious spontaneous triggering.  Palpable flexor nodule and A1 tenderness only of left ring.  No cortisone stigmata.  Symmetric wrist and forearm motion.  Negative [place].  Negative midcarpal shift.  Sensation intact to light touch in all distributions.   Capillary refill less than 2 seconds.      IMAGING / LABS / EMGs    None pertinent      Hand / UE Inj/Asp for trigger finger on 10/10/2023 1:24 PM  Details: 25 G needle, volar approach  Medications: 20 mg triamcinolone acetonide 40 mg/mL; 0.5 mL lidocaine 10 mg/mL (1 %)  Procedure, treatment alternatives, risks and benefits explained, specific risks discussed. Consent was given by the patient.             Electronically Signed      JAVAN Wolfe MD      Orthopaedic Hand Surgery      [phone]

## 2023-10-13 ENCOUNTER — APPOINTMENT (OUTPATIENT)
Dept: ORTHOPEDIC SURGERY | Facility: CLINIC | Age: 73
End: 2023-10-13
Payer: MEDICARE

## 2023-10-20 ENCOUNTER — APPOINTMENT (OUTPATIENT)
Dept: ORTHOPEDIC SURGERY | Facility: CLINIC | Age: 73
End: 2023-10-20
Payer: MEDICARE

## 2023-10-24 ENCOUNTER — APPOINTMENT (OUTPATIENT)
Dept: ORTHOPEDIC SURGERY | Facility: CLINIC | Age: 73
End: 2023-10-24
Payer: MEDICARE

## 2023-10-27 DIAGNOSIS — E11.9 TYPE 2 DIABETES MELLITUS WITHOUT COMPLICATIONS (MULTI): ICD-10-CM

## 2023-10-27 RX ORDER — PEN NEEDLE, DIABETIC 31 GX5/16"
NEEDLE, DISPOSABLE MISCELLANEOUS
Qty: 100 EACH | Refills: 3 | Status: SHIPPED | OUTPATIENT
Start: 2023-10-27 | End: 2024-06-03 | Stop reason: SDUPTHER

## 2023-11-28 ENCOUNTER — OFFICE VISIT (OUTPATIENT)
Dept: CARDIOLOGY | Facility: HOSPITAL | Age: 73
End: 2023-11-28
Payer: MEDICARE

## 2023-11-28 VITALS
SYSTOLIC BLOOD PRESSURE: 135 MMHG | DIASTOLIC BLOOD PRESSURE: 75 MMHG | HEART RATE: 67 BPM | WEIGHT: 211 LBS | BODY MASS INDEX: 28.62 KG/M2

## 2023-11-28 DIAGNOSIS — I10 HYPERTENSION, UNSPECIFIED TYPE: Primary | ICD-10-CM

## 2023-11-28 LAB
ATRIAL RATE: 67 BPM
P AXIS: 69 DEGREES
P OFFSET: 172 MS
P ONSET: 120 MS
PR INTERVAL: 182 MS
Q ONSET: 211 MS
QRS COUNT: 11 BEATS
QRS DURATION: 108 MS
QT INTERVAL: 406 MS
QTC CALCULATION(BAZETT): 429 MS
QTC FREDERICIA: 421 MS
R AXIS: 68 DEGREES
T AXIS: 51 DEGREES
T OFFSET: 414 MS
VENTRICULAR RATE: 67 BPM

## 2023-11-28 PROCEDURE — 1036F TOBACCO NON-USER: CPT | Performed by: INTERNAL MEDICINE

## 2023-11-28 PROCEDURE — 99214 OFFICE O/P EST MOD 30 MIN: CPT | Performed by: INTERNAL MEDICINE

## 2023-11-28 PROCEDURE — 93010 ELECTROCARDIOGRAM REPORT: CPT | Performed by: INTERNAL MEDICINE

## 2023-11-28 PROCEDURE — 3044F HG A1C LEVEL LT 7.0%: CPT | Performed by: INTERNAL MEDICINE

## 2023-11-28 PROCEDURE — 1160F RVW MEDS BY RX/DR IN RCRD: CPT | Performed by: INTERNAL MEDICINE

## 2023-11-28 PROCEDURE — 93005 ELECTROCARDIOGRAM TRACING: CPT | Performed by: INTERNAL MEDICINE

## 2023-11-28 PROCEDURE — 3078F DIAST BP <80 MM HG: CPT | Performed by: INTERNAL MEDICINE

## 2023-11-28 PROCEDURE — 3075F SYST BP GE 130 - 139MM HG: CPT | Performed by: INTERNAL MEDICINE

## 2023-11-28 PROCEDURE — 1159F MED LIST DOCD IN RCRD: CPT | Performed by: INTERNAL MEDICINE

## 2023-11-28 PROCEDURE — 99214 OFFICE O/P EST MOD 30 MIN: CPT | Mod: 25 | Performed by: INTERNAL MEDICINE

## 2023-11-28 ASSESSMENT — ENCOUNTER SYMPTOMS
DEPRESSION: 0
OCCASIONAL FEELINGS OF UNSTEADINESS: 0
LOSS OF SENSATION IN FEET: 0

## 2023-11-28 NOTE — PROGRESS NOTES
Subjective:  Patient returns for a routine 6-month follow-up.  He is a pleasant gentleman whom we follow for hypertension and hyperlipidemia in the setting of a markedly of elevated coronary artery calcium score.  He also has diabetes which is being managed by his PCP.  His coronary calcium score was markedly elevated at 1736.09.  A subsequent pharmacologic nuclear stress test was entirely normal.  This was done last summer.  Patient has generally been doing well from a cardiovascular standpoint.  He denies any clear angina or dyspnea although remains rather inactive due to some marked bilateral knee arthritis.  He also has been struggling with some acid reflux and has had his medications adjusted upwards with limited improvement.  He has not had any hospitalizations and denies any other new health concerns.  He is taking all of his medications compliantly and is tolerating them well.    Objective:  General: Alert, usual pleasant self.  HEENT: Unchanged.  Lungs: Clear without crackles or wheezing.  Cardiac: Distant heart tones with a 1/6 systolic ejection murmur.  S2 is normal without diastolic murmur rumble or rub.  Abdomen: Nontender with normal bowel sounds.  Extremities: No distal edema.  Skin: No rash.  Neuro: Unchanged.    EKG: Normal sinus rhythm.  Within normal limits.    Lipid panel: Cholesterol-150, HDL-28, LDL-61, TG-303.    Impression/plan:  Patient is doing well from a cardiovascular standpoint at this time.  He remains on appropriate antiplatelet therapy with aspirin without any bleeding problems.  His heart rate and blood pressure are under excellent control on his current medical regimen.    Patient's lipid panel looks reasonable on gemfibrozil.  He continues to work on his diabetic control.  I was pleased with his nuclear stress test from last summer indicating no compelling ischemia despite his elevated coronary calcium score.    Given patient's clinical stability, I will not embark on any  additional cardiovascular testing at this time.  I will see him back for routine follow-up in 6 months.  He did not need any prescriptions renewed.  He knows to call for any intercurrent concerns.    Patient instructions:    Continue current medications unchanged.    Return to clinic in 6 months.

## 2023-12-04 DIAGNOSIS — E11.9 TYPE 2 DIABETES MELLITUS WITHOUT COMPLICATION, WITH LONG-TERM CURRENT USE OF INSULIN (MULTI): ICD-10-CM

## 2023-12-04 DIAGNOSIS — N40.0 BENIGN PROSTATIC HYPERPLASIA, UNSPECIFIED WHETHER LOWER URINARY TRACT SYMPTOMS PRESENT: ICD-10-CM

## 2023-12-04 DIAGNOSIS — Z79.4 TYPE 2 DIABETES MELLITUS WITHOUT COMPLICATION, WITH LONG-TERM CURRENT USE OF INSULIN (MULTI): ICD-10-CM

## 2023-12-04 RX ORDER — GLIPIZIDE 10 MG/1
20 TABLET, FILM COATED, EXTENDED RELEASE ORAL DAILY
Qty: 60 TABLET | Refills: 2 | Status: SHIPPED | OUTPATIENT
Start: 2023-12-04 | End: 2024-02-12 | Stop reason: SDUPTHER

## 2023-12-04 RX ORDER — TAMSULOSIN HYDROCHLORIDE 0.4 MG/1
0.4 CAPSULE ORAL 2 TIMES DAILY
Qty: 180 CAPSULE | Refills: 0 | Status: SHIPPED | OUTPATIENT
Start: 2023-12-04 | End: 2024-02-02

## 2023-12-06 NOTE — PROGRESS NOTES
Subjective     Edward D Hume is a 73 y.o. male who presents for the following: Skin Check.  He notes 2 raised, brown, rough, scaly bumps on his left forearm and left upper back, which have been itching recently.  They have not changed in any other way, including in size or color, and they do not hurt or bleed.  He denies any other new, changing, or concerning skin lesions since his last visit; no bleeding, itching, or burning lesions.      Review of Systems:  No other skin or systemic complaints other than what is documented elsewhere in the note.    The following portions of the chart were reviewed this encounter and updated as appropriate:       Skin Cancer History  No skin cancer on file.    Specialty Problems    None      Past Dermatologic / Past Relevant Medical History:    - history of AKs   - no h/o atypical nevi or skin cancer    Family History:    No family history of melanoma or skin cancer    Social History:    The patient is retired from working as a  and designing CT scan machines; he enjoys grilling    Allergies:  Amoxicillin, Iodinated contrast media, Metformin, and Valsartan    Current Medications / CAM's:    Current Outpatient Medications:     albuterol 90 mcg/actuation inhaler, Inhale 2 puffs every 6 hours if needed for shortness of breath., Disp: 18 g, Rfl: 2    allopurinol (Zyloprim) 300 mg tablet, Take 1 tablet (300 mg) by mouth once daily., Disp: 30 tablet, Rfl: 4    amLODIPine (Norvasc) 10 mg tablet, Take 1 tablet (10 mg) by mouth once daily., Disp: 30 tablet, Rfl: 4    aspirin 81 mg EC tablet, Take 1 tablet (81 mg) by mouth once daily., Disp: 30 tablet, Rfl: 4    atenolol (Tenormin) 50 mg tablet, Take 1 tablet (50 mg) by mouth once daily., Disp: 30 tablet, Rfl: 4    biotin 10 mg tablet, Take 1 tablet (10 mg) by mouth once daily., Disp: , Rfl:     blood sugar diagnostic (Accu-Chek Kamala Plus test strp) strip, 1 strip 2 times a day., Disp: , Rfl:     cranberry fruit extract  "(cranberry extract) 300 mg tablet, Take 650 mg by mouth once daily., Disp: , Rfl:     famotidine (Pepcid) 20 mg tablet, Take 1 tablet (20 mg) by mouth once daily at bedtime., Disp: 30 tablet, Rfl: 4    gemfibrozil (Lopid) 600 mg tablet, Take 1 tablet (600 mg) by mouth 2 times a day., Disp: 30 tablet, Rfl: 4    glipiZIDE XL (Glucotrol XL) 10 mg 24 hr tablet, TAKE 2 TABLETS BY MOUTH ONCE DAILY., Disp: 60 tablet, Rfl: 2    ibuprofen 200 mg tablet, Take 1 tablet (200 mg) by mouth every 6 hours if needed., Disp: , Rfl:     insulin glargine (Toujeo SoloStar U-300 Insulin) 300 unit/mL (1.5 mL) injection, Inject 45 Units under the skin once daily in the morning. 45 units max, Disp: 13.5 mL, Rfl: 0    Januvia 100 mg tablet, Take 1 tablet (100 mg) by mouth once daily., Disp: 30 tablet, Rfl: 3    omeprazole (PriLOSEC) 40 mg DR capsule, Take 1 capsule (40 mg) by mouth once daily in the morning. Take before meals., Disp: 30 capsule, Rfl: 4    pen needle, diabetic (BD Ultra-Fine Short Pen Needle) 31 gauge x 5/16\" needle, USE AS DIRECTED, Disp: 100 each, Rfl: 3    tamsulosin (Flomax) 0.4 mg 24 hr capsule, Take 1 capsule (0.4 mg) by mouth 2 times a day., Disp: 180 capsule, Rfl: 0    valsartan-hydrochlorothiazide (Diovan-HCT) 160-12.5 mg tablet, Take 1 tablet by mouth once daily., Disp: 30 tablet, Rfl: 4    ketoconazole (NIZOral) 2 % cream, Apply topically 2 times a day. To affected areas of ears, Disp: 60 g, Rfl: 11     Objective   Well appearing patient in no apparent distress; mood and affect are within normal limits.    A full examination was performed including scalp, face, eyes, ears, nose, lips, neck, chest, axillae, abdomen, back, bilateral upper extremities, and bilateral lower extremities. All findings within normal limits unless otherwise noted below.    Assessment/Plan   1. Inflamed seborrheic keratosis (2)  Left Forearm - Anterior, Left Upper Back  On the patient's left medial proximal forearm and left medial upper " back, there are 2 similar-appearing 1 cm erythematous and light brown-colored, hyperkeratotic, stuck-on appearing papules each with a surrounding rim of erythema    Inflamed Seborrheic Keratoses -left medial proximal forearm and left medial upper back.  The benign nature of these lesions was discussed with the patient today and reassurance provided.  Given the history the patient provides of frequent irritation and associated symptoms as well as their inflamed appearance on exam today, I offered to treat these 2 lesions with liquid nitrogen cryotherapy.  The patient expressed understanding, is in agreement with this plan, and wishes to proceed with cryotherapy today.    Destr of lesion - Left Forearm - Anterior, Left Upper Back  Complexity: simple    Destruction method: cryotherapy    Informed consent: discussed and consent obtained    Lesion destroyed using liquid nitrogen: Yes    Cryotherapy cycles:  2  Outcome: patient tolerated procedure well with no complications    Post-procedure details: wound care instructions given      2. Actinic keratosis (20)  Scalp (20)  Scattered on the patient's scalp and face, there are multiple erythematous, gritty, scaly macules     Actinic Keratoses -scattered on scalp and face.  The pre-cancerous nature of these lesions and treatment options were discussed with the patient today.  At this time, I recommend treatment with liquid nitrogen cryotherapy.  The patient expressed understanding, is in agreement with this plan, and wishes to proceed with cryotherapy today.    Destr of lesion - Scalp  Complexity: simple    Destruction method: cryotherapy    Informed consent: discussed and consent obtained    Lesion destroyed using liquid nitrogen: Yes    Cryotherapy cycles:  1  Outcome: patient tolerated procedure well with no complications    Post-procedure details: wound care instructions given      3. Melanocytic nevus of trunk  Scattered on the patient's face, neck, trunk, and  extremities, there are multiple small, round- to oval-shaped, brown-pigmented and pink-colored, symmetric, uniform-appearing macules and dome-shaped papules    Clinically benign- to slightly atypical-appearing nevi - the clinically benign- to slightly atypical-appearing nature of the patient's nevi was discussed with the patient today.  None of the patient's nevi meet threshold for biopsy today.  I emphasized the importance of performing monthly self-skin exams using the ABCDs of monitoring moles, which were reviewed with the patient today and an informational hand-out provided.  I also emphasized the importance of sun avoidance and sun protection with daily sunscreen use.  The patient expressed understanding and is in agreement with this plan.    4. Seborrheic keratosis  Scattered on the patient's face, neck, trunk, and extremities, there are multiple tan- to light brown-colored, hyperkeratotic, stuck-on appearing papules of varying size and shape    Seborrheic Keratoses - the benign nature of these lesions was discussed with the patient today and reassurance provided.  No treatment is medically indicated for these lesions at this time.    5. Lentigo  Photodistributed  Multiple tan- to light brown-colored, round- to oval-shaped, symmetric and uniform-appearing macules and small patches consistent with lentigines scattered in sun-exposed areas.    Solar Lentigines and photodamage.  The clinically benign-appearing nature of these lesions and their relation to chronic sun exposure were discussed with the patient today and reassurance provided.  None of these lesions meet threshold for biopsy today, and thus no treatment is medically indicated for these lesions at this time.  The signs and symptoms of skin cancer were reviewed and the patient was advised to practice sun protection and sun avoidance, use daily sunscreen, and perform regular self skin exams.  The patient was instructed to monitor these lesions for any  changes, such as in size, shape, or color, or associated symptoms and to call our office to schedule a return visit for re-evaluation if any such changes or symptoms are noticed in the future.  The patient expressed understanding and is in agreement with this plan.    6. Seborrheic dermatitis  In his bilateral external ear canals, there are pink, scaly patches with whitish-yellowish, greasy scale    Seborrheic Dermatitis - flare in bilateral external ear canals.  The potentially chronic and intermittently flaring nature of this condition and treatment options were discussed extensively with the patient today.  At this time, I recommend topical anti-fungal therapy with Ketoconazole 2% cream, which the patient was instructed to apply twice daily to the affected areas of his ears.  The risks, benefits, and side effects of this medication were discussed.  The patient expressed understanding and is in agreement with this plan.    ketoconazole (NIZOral) 2 % cream  Apply topically 2 times a day. To affected areas of ears    7. History of actinic keratoses  There is evidence of photodamage in sun-exposed areas.    History of actinic keratoses and photodamage.  The signs and symptoms of skin cancer were reviewed and the patient was advised to practice sun protection and sun avoidance, use daily sunscreen, and perform regular self skin exams.  I will have the patient return to our office in 4 to 6 months for routine follow-up and skin exam, and the patient was instructed to call our office should the patient notice any new, changing, symptomatic, or otherwise concerning skin lesions before then.  The patient expressed understanding and is in agreement with this plan.    8. Diffuse photodamage of skin  Photodistributed  Diffuse photodamage with actinic changes with telangiectasia and mottled pigmentation in sun-exposed areas.    Photodamage.  The signs and symptoms of skin cancer were reviewed and the patient was advised to  practice sun protection and sun avoidance, use daily sunscreen, and perform regular self skin exams.  Sun protection was discussed, including avoiding the mid-day sun, wearing a sunscreen with SPF at least 50, and stressing the need for reapplication of sunscreen and applying more than they think they need.

## 2023-12-07 ENCOUNTER — OFFICE VISIT (OUTPATIENT)
Dept: DERMATOLOGY | Facility: CLINIC | Age: 73
End: 2023-12-07
Payer: MEDICARE

## 2023-12-07 DIAGNOSIS — D22.5 MELANOCYTIC NEVUS OF TRUNK: ICD-10-CM

## 2023-12-07 DIAGNOSIS — L82.0 INFLAMED SEBORRHEIC KERATOSIS: Primary | ICD-10-CM

## 2023-12-07 DIAGNOSIS — L57.0 ACTINIC KERATOSIS: ICD-10-CM

## 2023-12-07 DIAGNOSIS — L82.1 SEBORRHEIC KERATOSIS: ICD-10-CM

## 2023-12-07 DIAGNOSIS — L21.9 SEBORRHEIC DERMATITIS: ICD-10-CM

## 2023-12-07 DIAGNOSIS — Z87.2 HISTORY OF ACTINIC KERATOSES: ICD-10-CM

## 2023-12-07 DIAGNOSIS — L81.4 LENTIGO: ICD-10-CM

## 2023-12-07 DIAGNOSIS — L57.8 DIFFUSE PHOTODAMAGE OF SKIN: ICD-10-CM

## 2023-12-07 PROCEDURE — 1160F RVW MEDS BY RX/DR IN RCRD: CPT | Performed by: DERMATOLOGY

## 2023-12-07 PROCEDURE — 17004 DESTROY PREMAL LESIONS 15/>: CPT | Performed by: DERMATOLOGY

## 2023-12-07 PROCEDURE — 1036F TOBACCO NON-USER: CPT | Performed by: DERMATOLOGY

## 2023-12-07 PROCEDURE — 99214 OFFICE O/P EST MOD 30 MIN: CPT | Performed by: DERMATOLOGY

## 2023-12-07 PROCEDURE — 1159F MED LIST DOCD IN RCRD: CPT | Performed by: DERMATOLOGY

## 2023-12-07 PROCEDURE — 3044F HG A1C LEVEL LT 7.0%: CPT | Performed by: DERMATOLOGY

## 2023-12-07 PROCEDURE — 17110 DESTRUCTION B9 LES UP TO 14: CPT | Performed by: DERMATOLOGY

## 2023-12-07 RX ORDER — KETOCONAZOLE 20 MG/G
CREAM TOPICAL 2 TIMES DAILY
Qty: 60 G | Refills: 11 | Status: SHIPPED | OUTPATIENT
Start: 2023-12-07 | End: 2024-06-03 | Stop reason: ALTCHOICE

## 2023-12-07 ASSESSMENT — DERMATOLOGY QUALITY OF LIFE (QOL) ASSESSMENT: ARE THERE EXCLUSIONS OR EXCEPTIONS FOR THE QUALITY OF LIFE ASSESSMENT: NO

## 2023-12-07 ASSESSMENT — ITCH NUMERIC RATING SCALE: HOW SEVERE IS YOUR ITCHING?: 0

## 2023-12-07 ASSESSMENT — DERMATOLOGY PATIENT ASSESSMENT
DO YOU USE SUNSCREEN: OCCASIONALLY
WHERE ARE THESE NEW OR CHANGING LESIONS LOCATED: LEFT FOREARM
DO YOU USE A TANNING BED: NO
DO YOU HAVE ANY NEW OR CHANGING LESIONS: YES

## 2024-01-31 DIAGNOSIS — E11.9 TYPE 2 DIABETES MELLITUS WITHOUT COMPLICATION, WITH LONG-TERM CURRENT USE OF INSULIN (MULTI): ICD-10-CM

## 2024-01-31 DIAGNOSIS — Z79.4 TYPE 2 DIABETES MELLITUS WITHOUT COMPLICATION, WITH LONG-TERM CURRENT USE OF INSULIN (MULTI): ICD-10-CM

## 2024-01-31 RX ORDER — INSULIN GLARGINE 300 [IU]/ML
45 INJECTION, SOLUTION SUBCUTANEOUS EVERY MORNING
Qty: 13.5 ML | Refills: 0 | Status: SHIPPED | OUTPATIENT
Start: 2024-01-31 | End: 2024-02-12 | Stop reason: SDUPTHER

## 2024-02-02 DIAGNOSIS — I10 HYPERTENSION, UNSPECIFIED TYPE: ICD-10-CM

## 2024-02-02 DIAGNOSIS — N40.0 BENIGN PROSTATIC HYPERPLASIA, UNSPECIFIED WHETHER LOWER URINARY TRACT SYMPTOMS PRESENT: ICD-10-CM

## 2024-02-02 RX ORDER — AMLODIPINE BESYLATE 10 MG/1
10 TABLET ORAL DAILY
Qty: 30 TABLET | Refills: 4 | Status: SHIPPED | OUTPATIENT
Start: 2024-02-02 | End: 2024-02-12 | Stop reason: SDUPTHER

## 2024-02-02 RX ORDER — TAMSULOSIN HYDROCHLORIDE 0.4 MG/1
0.4 CAPSULE ORAL 2 TIMES DAILY
Qty: 180 CAPSULE | Refills: 0 | Status: SHIPPED | OUTPATIENT
Start: 2024-02-02 | End: 2024-02-12 | Stop reason: SDUPTHER

## 2024-02-12 ENCOUNTER — OFFICE VISIT (OUTPATIENT)
Dept: PRIMARY CARE | Facility: CLINIC | Age: 74
End: 2024-02-12
Payer: MEDICARE

## 2024-02-12 VITALS
WEIGHT: 210 LBS | BODY MASS INDEX: 28.44 KG/M2 | DIASTOLIC BLOOD PRESSURE: 65 MMHG | HEIGHT: 72 IN | SYSTOLIC BLOOD PRESSURE: 130 MMHG

## 2024-02-12 DIAGNOSIS — I10 HYPERTENSION, UNSPECIFIED TYPE: ICD-10-CM

## 2024-02-12 DIAGNOSIS — M19.049 LOCALIZED, PRIMARY OSTEOARTHRITIS OF HAND, UNSPECIFIED LATERALITY: Primary | ICD-10-CM

## 2024-02-12 DIAGNOSIS — E11.9 TYPE 2 DIABETES MELLITUS WITHOUT COMPLICATION, WITH LONG-TERM CURRENT USE OF INSULIN (MULTI): ICD-10-CM

## 2024-02-12 DIAGNOSIS — N40.0 BENIGN PROSTATIC HYPERPLASIA, UNSPECIFIED WHETHER LOWER URINARY TRACT SYMPTOMS PRESENT: ICD-10-CM

## 2024-02-12 DIAGNOSIS — Z79.4 TYPE 2 DIABETES MELLITUS WITHOUT COMPLICATION, WITH LONG-TERM CURRENT USE OF INSULIN (MULTI): ICD-10-CM

## 2024-02-12 DIAGNOSIS — E78.5 HYPERLIPIDEMIA, UNSPECIFIED HYPERLIPIDEMIA TYPE: ICD-10-CM

## 2024-02-12 DIAGNOSIS — K21.9 GASTROESOPHAGEAL REFLUX DISEASE WITHOUT ESOPHAGITIS: ICD-10-CM

## 2024-02-12 DIAGNOSIS — M10.9 GOUT, UNSPECIFIED CAUSE, UNSPECIFIED CHRONICITY, UNSPECIFIED SITE: ICD-10-CM

## 2024-02-12 LAB — POC HEMOGLOBIN A1C: 6 % (ref 4.2–6.5)

## 2024-02-12 PROCEDURE — 99214 OFFICE O/P EST MOD 30 MIN: CPT | Performed by: INTERNAL MEDICINE

## 2024-02-12 PROCEDURE — 83036 HEMOGLOBIN GLYCOSYLATED A1C: CPT | Performed by: INTERNAL MEDICINE

## 2024-02-12 PROCEDURE — 3075F SYST BP GE 130 - 139MM HG: CPT | Performed by: INTERNAL MEDICINE

## 2024-02-12 PROCEDURE — 3078F DIAST BP <80 MM HG: CPT | Performed by: INTERNAL MEDICINE

## 2024-02-12 PROCEDURE — 1036F TOBACCO NON-USER: CPT | Performed by: INTERNAL MEDICINE

## 2024-02-12 RX ORDER — INSULIN GLARGINE 300 [IU]/ML
45 INJECTION, SOLUTION SUBCUTANEOUS EVERY MORNING
Qty: 13.5 ML | Refills: 0 | Status: ON HOLD | OUTPATIENT
Start: 2024-02-12 | End: 2024-02-25 | Stop reason: SDUPTHER

## 2024-02-12 RX ORDER — GEMFIBROZIL 600 MG/1
600 TABLET, FILM COATED ORAL 2 TIMES DAILY
Qty: 30 TABLET | Refills: 4 | Status: SHIPPED | OUTPATIENT
Start: 2024-02-12 | End: 2024-04-30

## 2024-02-12 RX ORDER — OMEPRAZOLE 40 MG/1
40 CAPSULE, DELAYED RELEASE ORAL
Qty: 30 CAPSULE | Refills: 4 | Status: SHIPPED | OUTPATIENT
Start: 2024-02-12 | End: 2024-06-03 | Stop reason: SDUPTHER

## 2024-02-12 RX ORDER — ATENOLOL 50 MG/1
50 TABLET ORAL DAILY
Qty: 30 TABLET | Refills: 4 | Status: SHIPPED | OUTPATIENT
Start: 2024-02-12 | End: 2024-03-04

## 2024-02-12 RX ORDER — TAMSULOSIN HYDROCHLORIDE 0.4 MG/1
0.4 CAPSULE ORAL 2 TIMES DAILY
Qty: 180 CAPSULE | Refills: 0 | Status: SHIPPED | OUTPATIENT
Start: 2024-02-12 | End: 2024-06-03 | Stop reason: SDUPTHER

## 2024-02-12 RX ORDER — GLIPIZIDE 10 MG/1
20 TABLET, FILM COATED, EXTENDED RELEASE ORAL DAILY
Qty: 60 TABLET | Refills: 2 | Status: SHIPPED | OUTPATIENT
Start: 2024-02-12 | End: 2024-05-27

## 2024-02-12 RX ORDER — ASPIRIN 81 MG/1
81 TABLET ORAL DAILY
Qty: 30 TABLET | Refills: 4 | Status: SHIPPED | OUTPATIENT
Start: 2024-02-12 | End: 2024-06-03 | Stop reason: SDUPTHER

## 2024-02-12 RX ORDER — FAMOTIDINE 20 MG/1
20 TABLET, FILM COATED ORAL NIGHTLY
Qty: 30 TABLET | Refills: 4 | Status: SHIPPED | OUTPATIENT
Start: 2024-02-12 | End: 2024-06-03 | Stop reason: SDUPTHER

## 2024-02-12 RX ORDER — SITAGLIPTIN 100 MG/1
100 TABLET, FILM COATED ORAL DAILY
Qty: 30 TABLET | Refills: 3 | Status: SHIPPED | OUTPATIENT
Start: 2024-02-12 | End: 2024-06-03 | Stop reason: SDUPTHER

## 2024-02-12 RX ORDER — VALSARTAN AND HYDROCHLOROTHIAZIDE 160; 12.5 MG/1; MG/1
1 TABLET, FILM COATED ORAL DAILY
Qty: 30 TABLET | Refills: 4 | Status: SHIPPED | OUTPATIENT
Start: 2024-02-12 | End: 2024-06-03 | Stop reason: SDUPTHER

## 2024-02-12 RX ORDER — ALLOPURINOL 300 MG/1
300 TABLET ORAL DAILY
Qty: 30 TABLET | Refills: 4 | Status: SHIPPED | OUTPATIENT
Start: 2024-02-12 | End: 2024-06-03 | Stop reason: SDUPTHER

## 2024-02-12 RX ORDER — AMLODIPINE BESYLATE 10 MG/1
10 TABLET ORAL DAILY
Qty: 30 TABLET | Refills: 4 | Status: SHIPPED | OUTPATIENT
Start: 2024-02-12 | End: 2024-06-03 | Stop reason: SDUPTHER

## 2024-02-12 ASSESSMENT — ENCOUNTER SYMPTOMS
DIFFICULTY URINATING: 0
VOMITING: 0
NAUSEA: 0
FREQUENCY: 0
ARTHRALGIAS: 1
PALPITATIONS: 0
SHORTNESS OF BREATH: 0
MYALGIAS: 0
WHEEZING: 0
FEVER: 0
ABDOMINAL DISTENTION: 0
DYSURIA: 0
CHILLS: 0
NUMBNESS: 1
ABDOMINAL PAIN: 0
DIZZINESS: 0
COUGH: 0
DIARRHEA: 0
HEMATURIA: 0
CONFUSION: 0
COLOR CHANGE: 0
FLANK PAIN: 0
LIGHT-HEADEDNESS: 0

## 2024-02-12 ASSESSMENT — PATIENT HEALTH QUESTIONNAIRE - PHQ9
2. FEELING DOWN, DEPRESSED OR HOPELESS: NOT AT ALL
1. LITTLE INTEREST OR PLEASURE IN DOING THINGS: NOT AT ALL
SUM OF ALL RESPONSES TO PHQ9 QUESTIONS 1 AND 2: 0

## 2024-02-12 ASSESSMENT — LIFESTYLE VARIABLES: HOW OFTEN DO YOU HAVE A DRINK CONTAINING ALCOHOL: MONTHLY OR LESS

## 2024-02-12 NOTE — PROGRESS NOTES
I saw and evaluated the patient. I personally obtained the key and critical portions of the history and physical exam or was physically present for key and critical portions performed by the resident/fellow. I reviewed the resident/fellow's documentation and discussed the patient with the resident/fellow. I agree with the resident/fellow's medical decision making as documented in the note.    Roselia Ibrahim MD

## 2024-02-12 NOTE — ASSESSMENT & PLAN NOTE
- controlled with Lopid 600 mg daily  - did not tolerate statins  - ASCVD 45%  - follows up with Cardio  - AAA US screening ordered

## 2024-02-12 NOTE — PROGRESS NOTES
"Subjective   Subjective:     History Of Present Illness:  Edward D Hume is a 73 y.o. male with a PMH of HTN, HLD, OA, DM type 2, GERD, and Gout, who presents to [facility] for follow up appointment.  Today, he reports experiencing  worsening arthritic pains in right knee and both shoulders and additionally complains of newly developed b/l peripheral neuropathy likely due to diabetes that he is tolerating and does not want to start any treatment for it.  Otherwise, he takes his medications as prescribed and says that he takes Ibuprofen for arthritis as \"once in a while\".     Past Medical History:  He has a past medical history of Acute nonparalytic poliomyelitis, Body mass index (BMI) 25.0-25.9, adult (10/26/2019), Body mass index (BMI) 27.0-27.9, adult (09/22/2021), Body mass index (BMI) 29.0-29.9, adult (08/31/2019), Crushing injury of unspecified finger(s), initial encounter (06/12/2014), Disorder of the skin and subcutaneous tissue, unspecified (09/04/2019), Disorder of the skin and subcutaneous tissue, unspecified (03/30/2016), Elevated prostate specific antigen (PSA), History of falling (06/27/2016), Other chest pain (03/07/2018), Other conditions influencing health status (02/22/2017), Other conditions influencing health status (03/04/2016), Other conditions influencing health status, Personal history of other mental and behavioral disorders (11/06/2018), Personal history of other specified conditions (04/16/2019), Personal history of other specified conditions (04/16/2019), Personal history of other specified conditions (06/29/2016), Puncture wound without foreign body of left hand, initial encounter (03/05/2019), Puncture wound without foreign body of unspecified hand, initial encounter (03/05/2019), Superficial mycosis, unspecified (12/01/2014), Trigger finger, left middle finger (07/24/2017), Trigger finger, left middle finger (07/24/2017), Trigger finger, left ring finger (07/24/2017), Trigger " finger, right index finger (07/24/2017), and Trigger finger, right middle finger (11/10/2017).    Past Surgical History:  He has a past surgical history that includes Hand tendon surgery (02/15/2013); Other surgical history (04/09/2013); Other surgical history (07/02/2020); Other surgical history (12/06/2017); Other surgical history (06/12/2013); Other surgical history (06/12/2013); and Shoulder surgery (06/12/2013).     Social History:  He reports that he has quit smoking. His smoking use included cigarettes. He has never used smokeless tobacco. He reports current alcohol use. He reports that he does not use drugs.    Family History:  No family history on file.    Allergies:  Amoxicillin, Iodinated contrast media, Metformin, and Valsartan    Home Medications:  (Not in a hospital admission)    Review Of Systems:  11-point ROS was performed and is negative except as noted below and in the HPI.     Review of Systems   Constitutional:  Negative for chills and fever.   Eyes:  Negative for visual disturbance.   Respiratory:  Negative for cough, shortness of breath and wheezing.    Cardiovascular:  Negative for chest pain, palpitations and leg swelling.   Gastrointestinal:  Negative for abdominal distention, abdominal pain, diarrhea, nausea and vomiting.   Genitourinary:  Negative for difficulty urinating, dysuria, flank pain, frequency and hematuria.   Musculoskeletal:  Positive for arthralgias. Negative for myalgias.   Skin:  Negative for color change.   Neurological:  Positive for numbness. Negative for dizziness and light-headedness.   Psychiatric/Behavioral:  Negative for confusion.         Objective   Objective:     /65 (BP Location: Left arm, Patient Position: Sitting, BP Cuff Size: Large adult)   Ht 1.829 m (6')   Wt 95.3 kg (210 lb)   BMI 28.48 kg/m²     Physical Exam  Constitutional:       General: He is not in acute distress.     Appearance: Normal appearance.   HENT:      Head: Normocephalic and  atraumatic.      Mouth/Throat:      Mouth: Mucous membranes are moist.   Eyes:      Conjunctiva/sclera: Conjunctivae normal.      Pupils: Pupils are equal, round, and reactive to light.   Cardiovascular:      Rate and Rhythm: Normal rate and regular rhythm.      Heart sounds: Normal heart sounds.   Pulmonary:      Effort: No respiratory distress.      Breath sounds: Normal breath sounds. No wheezing or rhonchi.   Abdominal:      General: Bowel sounds are normal.      Palpations: Abdomen is soft.      Tenderness: There is no abdominal tenderness.   Musculoskeletal:         General: No swelling.      Cervical back: Neck supple.   Skin:     General: Skin is warm and dry.   Neurological:      General: No focal deficit present.      Mental Status: He is alert.          Assessment & Plan:     Assessment/Plan     Problem List Items Addressed This Visit       Arthritis, degenerative, localized, primary, hand     - Ibuprofen PRN + Tylenol         Esophageal reflux     - controlled with Pepcid in PM and Prilosec in AM         Gout     - denies recent flare-ups  - controlled with Allopurinol         Hyperlipidemia - Primary     - controlled with Lopid 600 mg daily  - did not tolerate statins  - AAA US screening ordered         Relevant Orders    Vascular US Abdominal Aorta Aneurysm AAA Screening    Hypertension     - BP today 130/65  - continue Amlodipine 10 mg, Atenolol 50 mg, Diovan 160-12.5 mg         Type 2 diabetes mellitus (CMS/[facility])     - A1c 5.5% IN 9/2023, will repeat today  - controlled with Januvia, Insulin glargine 45u          #Health Maintenance:  - A1c  - AAA US screening  - next routine labs in June    Revisit Topics: arthritis and peripheral neuropathy.    Dispo: Patient is scheduled to return to clinic in June.    Pj Ivy DO, PGY-2  Internal Medicine     Disclaimer: Documentation completed with the information available at the time of input. The times in the chart may not be reflective of actual  patient care times, interventions, or procedures. Documentation occurs after the physical care of the patient.

## 2024-02-22 ENCOUNTER — HOSPITAL ENCOUNTER (OUTPATIENT)
Facility: HOSPITAL | Age: 74
Setting detail: OBSERVATION
Discharge: HOME | End: 2024-02-25
Attending: EMERGENCY MEDICINE | Admitting: INTERNAL MEDICINE
Payer: MEDICARE

## 2024-02-22 ENCOUNTER — APPOINTMENT (OUTPATIENT)
Dept: CARDIOLOGY | Facility: HOSPITAL | Age: 74
End: 2024-02-22
Payer: MEDICARE

## 2024-02-22 ENCOUNTER — APPOINTMENT (OUTPATIENT)
Dept: RADIOLOGY | Facility: HOSPITAL | Age: 74
End: 2024-02-22
Payer: MEDICARE

## 2024-02-22 DIAGNOSIS — Z79.4 TYPE 2 DIABETES MELLITUS WITHOUT COMPLICATION, WITH LONG-TERM CURRENT USE OF INSULIN (MULTI): ICD-10-CM

## 2024-02-22 DIAGNOSIS — H81.391 PERIPHERAL VERTIGO INVOLVING RIGHT EAR: ICD-10-CM

## 2024-02-22 DIAGNOSIS — R42 DIZZINESS: ICD-10-CM

## 2024-02-22 DIAGNOSIS — E11.9 TYPE 2 DIABETES MELLITUS WITHOUT COMPLICATION, WITH LONG-TERM CURRENT USE OF INSULIN (MULTI): ICD-10-CM

## 2024-02-22 DIAGNOSIS — R42 VERTIGO: ICD-10-CM

## 2024-02-22 DIAGNOSIS — R60.0 LOCALIZED EDEMA: Primary | ICD-10-CM

## 2024-02-22 PROBLEM — N17.9 ACUTE ON CHRONIC RENAL FAILURE (CMS-HCC): Status: ACTIVE | Noted: 2024-02-22

## 2024-02-22 PROBLEM — N18.9 ACUTE ON CHRONIC RENAL FAILURE (CMS-HCC): Status: ACTIVE | Noted: 2024-02-22

## 2024-02-22 LAB
ALBUMIN SERPL BCP-MCNC: 4.2 G/DL (ref 3.4–5)
ALP SERPL-CCNC: 51 U/L (ref 33–136)
ALT SERPL W P-5'-P-CCNC: 14 U/L (ref 10–52)
ANION GAP SERPL CALC-SCNC: 12 MMOL/L (ref 10–20)
AST SERPL W P-5'-P-CCNC: 14 U/L (ref 9–39)
BASOPHILS # BLD AUTO: 0.02 X10*3/UL (ref 0–0.1)
BASOPHILS NFR BLD AUTO: 0.3 %
BILIRUB SERPL-MCNC: 0.5 MG/DL (ref 0–1.2)
BUN SERPL-MCNC: 29 MG/DL (ref 6–23)
CALCIUM SERPL-MCNC: 9 MG/DL (ref 8.6–10.3)
CARDIAC TROPONIN I PNL SERPL HS: 4 NG/L (ref 0–20)
CARDIAC TROPONIN I PNL SERPL HS: 4 NG/L (ref 0–20)
CHLORIDE SERPL-SCNC: 103 MMOL/L (ref 98–107)
CO2 SERPL-SCNC: 27 MMOL/L (ref 21–32)
CREAT SERPL-MCNC: 1.43 MG/DL (ref 0.5–1.3)
EGFRCR SERPLBLD CKD-EPI 2021: 52 ML/MIN/1.73M*2
EOSINOPHIL # BLD AUTO: 0.19 X10*3/UL (ref 0–0.4)
EOSINOPHIL NFR BLD AUTO: 3.3 %
ERYTHROCYTE [DISTWIDTH] IN BLOOD BY AUTOMATED COUNT: 12.2 % (ref 11.5–14.5)
GLUCOSE BLD MANUAL STRIP-MCNC: 148 MG/DL (ref 74–99)
GLUCOSE SERPL-MCNC: 123 MG/DL (ref 74–99)
HCT VFR BLD AUTO: 39.9 % (ref 41–52)
HGB BLD-MCNC: 13.5 G/DL (ref 13.5–17.5)
IMM GRANULOCYTES # BLD AUTO: 0.07 X10*3/UL (ref 0–0.5)
IMM GRANULOCYTES NFR BLD AUTO: 1.2 % (ref 0–0.9)
LYMPHOCYTES # BLD AUTO: 1.14 X10*3/UL (ref 0.8–3)
LYMPHOCYTES NFR BLD AUTO: 19.7 %
MCH RBC QN AUTO: 31 PG (ref 26–34)
MCHC RBC AUTO-ENTMCNC: 33.8 G/DL (ref 32–36)
MCV RBC AUTO: 92 FL (ref 80–100)
MONOCYTES # BLD AUTO: 0.52 X10*3/UL (ref 0.05–0.8)
MONOCYTES NFR BLD AUTO: 9 %
NEUTROPHILS # BLD AUTO: 3.85 X10*3/UL (ref 1.6–5.5)
NEUTROPHILS NFR BLD AUTO: 66.5 %
NRBC BLD-RTO: 0 /100 WBCS (ref 0–0)
PLATELET # BLD AUTO: 170 X10*3/UL (ref 150–450)
POTASSIUM SERPL-SCNC: 3.9 MMOL/L (ref 3.5–5.3)
PROT SERPL-MCNC: 7.1 G/DL (ref 6.4–8.2)
RBC # BLD AUTO: 4.35 X10*6/UL (ref 4.5–5.9)
SODIUM SERPL-SCNC: 138 MMOL/L (ref 136–145)
WBC # BLD AUTO: 5.8 X10*3/UL (ref 4.4–11.3)

## 2024-02-22 PROCEDURE — 99223 1ST HOSP IP/OBS HIGH 75: CPT | Performed by: PHYSICIAN ASSISTANT

## 2024-02-22 PROCEDURE — G0378 HOSPITAL OBSERVATION PER HR: HCPCS

## 2024-02-22 PROCEDURE — 82947 ASSAY GLUCOSE BLOOD QUANT: CPT | Mod: 59

## 2024-02-22 PROCEDURE — 84484 ASSAY OF TROPONIN QUANT: CPT | Performed by: EMERGENCY MEDICINE

## 2024-02-22 PROCEDURE — 85025 COMPLETE CBC W/AUTO DIFF WBC: CPT | Performed by: EMERGENCY MEDICINE

## 2024-02-22 PROCEDURE — 80053 COMPREHEN METABOLIC PANEL: CPT | Performed by: EMERGENCY MEDICINE

## 2024-02-22 PROCEDURE — 93005 ELECTROCARDIOGRAM TRACING: CPT

## 2024-02-22 PROCEDURE — 36415 COLL VENOUS BLD VENIPUNCTURE: CPT | Performed by: EMERGENCY MEDICINE

## 2024-02-22 PROCEDURE — 70450 CT HEAD/BRAIN W/O DYE: CPT

## 2024-02-22 PROCEDURE — 70450 CT HEAD/BRAIN W/O DYE: CPT | Performed by: STUDENT IN AN ORGANIZED HEALTH CARE EDUCATION/TRAINING PROGRAM

## 2024-02-22 PROCEDURE — 84484 ASSAY OF TROPONIN QUANT: CPT | Mod: 91 | Performed by: EMERGENCY MEDICINE

## 2024-02-22 PROCEDURE — 99285 EMERGENCY DEPT VISIT HI MDM: CPT | Mod: 25

## 2024-02-22 RX ORDER — ASPIRIN 81 MG/1
81 TABLET ORAL DAILY
Status: DISCONTINUED | OUTPATIENT
Start: 2024-02-23 | End: 2024-02-25 | Stop reason: HOSPADM

## 2024-02-22 RX ORDER — TALC
3 POWDER (GRAM) TOPICAL DAILY
Status: DISCONTINUED | OUTPATIENT
Start: 2024-02-22 | End: 2024-02-25 | Stop reason: HOSPADM

## 2024-02-22 RX ORDER — ACETAMINOPHEN 650 MG/1
650 SUPPOSITORY RECTAL EVERY 4 HOURS PRN
Status: DISCONTINUED | OUTPATIENT
Start: 2024-02-22 | End: 2024-02-25

## 2024-02-22 RX ORDER — DEXTROSE MONOHYDRATE 100 MG/ML
0.3 INJECTION, SOLUTION INTRAVENOUS ONCE AS NEEDED
Status: DISCONTINUED | OUTPATIENT
Start: 2024-02-22 | End: 2024-02-25

## 2024-02-22 RX ORDER — GLIPIZIDE 2.5 MG/1
20 TABLET, EXTENDED RELEASE ORAL DAILY
Status: DISCONTINUED | OUTPATIENT
Start: 2024-02-23 | End: 2024-02-25 | Stop reason: HOSPADM

## 2024-02-22 RX ORDER — AMLODIPINE BESYLATE 10 MG/1
10 TABLET ORAL DAILY
Status: DISCONTINUED | OUTPATIENT
Start: 2024-02-23 | End: 2024-02-25 | Stop reason: HOSPADM

## 2024-02-22 RX ORDER — DEXTROSE 50 % IN WATER (D50W) INTRAVENOUS SYRINGE
25
Status: DISCONTINUED | OUTPATIENT
Start: 2024-02-22 | End: 2024-02-25 | Stop reason: HOSPADM

## 2024-02-22 RX ORDER — ACETAMINOPHEN 160 MG/5ML
650 SOLUTION ORAL EVERY 4 HOURS PRN
Status: DISCONTINUED | OUTPATIENT
Start: 2024-02-22 | End: 2024-02-25

## 2024-02-22 RX ORDER — ATENOLOL 50 MG/1
50 TABLET ORAL DAILY
Status: DISCONTINUED | OUTPATIENT
Start: 2024-02-23 | End: 2024-02-25 | Stop reason: HOSPADM

## 2024-02-22 RX ORDER — ONDANSETRON 4 MG/1
4 TABLET, ORALLY DISINTEGRATING ORAL EVERY 8 HOURS PRN
Status: DISCONTINUED | OUTPATIENT
Start: 2024-02-22 | End: 2024-02-25 | Stop reason: HOSPADM

## 2024-02-22 RX ORDER — INSULIN GLARGINE 100 [IU]/ML
36 INJECTION, SOLUTION SUBCUTANEOUS DAILY
Status: DISCONTINUED | OUTPATIENT
Start: 2024-02-23 | End: 2024-02-25 | Stop reason: HOSPADM

## 2024-02-22 RX ORDER — PANTOPRAZOLE SODIUM 40 MG/1
40 TABLET, DELAYED RELEASE ORAL
Status: DISCONTINUED | OUTPATIENT
Start: 2024-02-23 | End: 2024-02-25 | Stop reason: HOSPADM

## 2024-02-22 RX ORDER — ALBUTEROL SULFATE 0.83 MG/ML
2.5 SOLUTION RESPIRATORY (INHALATION) EVERY 6 HOURS PRN
Status: DISCONTINUED | OUTPATIENT
Start: 2024-02-22 | End: 2024-02-22

## 2024-02-22 RX ORDER — GEMFIBROZIL 600 MG/1
600 TABLET, FILM COATED ORAL 2 TIMES DAILY
Status: DISCONTINUED | OUTPATIENT
Start: 2024-02-22 | End: 2024-02-25 | Stop reason: HOSPADM

## 2024-02-22 RX ORDER — FAMOTIDINE 20 MG/1
20 TABLET, FILM COATED ORAL NIGHTLY
Status: DISCONTINUED | OUTPATIENT
Start: 2024-02-22 | End: 2024-02-25 | Stop reason: HOSPADM

## 2024-02-22 RX ORDER — SENNOSIDES 8.6 MG/1
2 TABLET ORAL 2 TIMES DAILY
Status: DISCONTINUED | OUTPATIENT
Start: 2024-02-22 | End: 2024-02-23

## 2024-02-22 RX ORDER — ONDANSETRON HYDROCHLORIDE 2 MG/ML
4 INJECTION, SOLUTION INTRAVENOUS EVERY 8 HOURS PRN
Status: DISCONTINUED | OUTPATIENT
Start: 2024-02-22 | End: 2024-02-25 | Stop reason: HOSPADM

## 2024-02-22 RX ORDER — ENOXAPARIN SODIUM 100 MG/ML
40 INJECTION SUBCUTANEOUS EVERY 24 HOURS
Status: DISCONTINUED | OUTPATIENT
Start: 2024-02-22 | End: 2024-02-25 | Stop reason: HOSPADM

## 2024-02-22 RX ORDER — PANTOPRAZOLE SODIUM 40 MG/10ML
40 INJECTION, POWDER, LYOPHILIZED, FOR SOLUTION INTRAVENOUS
Status: DISCONTINUED | OUTPATIENT
Start: 2024-02-23 | End: 2024-02-25

## 2024-02-22 RX ORDER — ALBUTEROL SULFATE 0.83 MG/ML
2.5 SOLUTION RESPIRATORY (INHALATION) EVERY 2 HOUR PRN
Status: DISCONTINUED | OUTPATIENT
Start: 2024-02-22 | End: 2024-02-25 | Stop reason: HOSPADM

## 2024-02-22 RX ORDER — ACETAMINOPHEN 325 MG/1
650 TABLET ORAL EVERY 4 HOURS PRN
Status: DISCONTINUED | OUTPATIENT
Start: 2024-02-22 | End: 2024-02-25

## 2024-02-22 ASSESSMENT — PAIN SCALES - GENERAL
PAINLEVEL_OUTOF10: 2
PAINLEVEL_OUTOF10: 0 - NO PAIN
PAINLEVEL_OUTOF10: 1

## 2024-02-22 ASSESSMENT — PAIN - FUNCTIONAL ASSESSMENT
PAIN_FUNCTIONAL_ASSESSMENT: 0-10
PAIN_FUNCTIONAL_ASSESSMENT: 0-10

## 2024-02-22 ASSESSMENT — PAIN DESCRIPTION - DESCRIPTORS: DESCRIPTORS: SORE

## 2024-02-22 ASSESSMENT — COLUMBIA-SUICIDE SEVERITY RATING SCALE - C-SSRS
2. HAVE YOU ACTUALLY HAD ANY THOUGHTS OF KILLING YOURSELF?: NO
6. HAVE YOU EVER DONE ANYTHING, STARTED TO DO ANYTHING, OR PREPARED TO DO ANYTHING TO END YOUR LIFE?: NO
1. IN THE PAST MONTH, HAVE YOU WISHED YOU WERE DEAD OR WISHED YOU COULD GO TO SLEEP AND NOT WAKE UP?: NO

## 2024-02-22 NOTE — ED TRIAGE NOTES
Pt presents to ED for dizziness and lightheadedness worsening when laying flat x1 week. Pt reports tinnitus x2 weeks. Denies CP, abd pain. Pt reports SOB, nausea and atraumatic burning sensation in LLE.

## 2024-02-23 ENCOUNTER — APPOINTMENT (OUTPATIENT)
Dept: RADIOLOGY | Facility: HOSPITAL | Age: 74
End: 2024-02-23
Payer: MEDICARE

## 2024-02-23 LAB
ANION GAP SERPL CALC-SCNC: 14 MMOL/L (ref 10–20)
BUN SERPL-MCNC: 30 MG/DL (ref 6–23)
CALCIUM SERPL-MCNC: 9.1 MG/DL (ref 8.6–10.3)
CHLORIDE SERPL-SCNC: 105 MMOL/L (ref 98–107)
CHOLEST SERPL-MCNC: 140 MG/DL (ref 0–199)
CHOLESTEROL/HDL RATIO: 6.2
CO2 SERPL-SCNC: 27 MMOL/L (ref 21–32)
CREAT SERPL-MCNC: 1.32 MG/DL (ref 0.5–1.3)
EGFRCR SERPLBLD CKD-EPI 2021: 58 ML/MIN/1.73M*2
ERYTHROCYTE [DISTWIDTH] IN BLOOD BY AUTOMATED COUNT: 12.1 % (ref 11.5–14.5)
EST. AVERAGE GLUCOSE BLD GHB EST-MCNC: 117 MG/DL
EST. AVERAGE GLUCOSE BLD GHB EST-MCNC: 120 MG/DL
GLUCOSE BLD MANUAL STRIP-MCNC: 209 MG/DL (ref 74–99)
GLUCOSE BLD MANUAL STRIP-MCNC: 280 MG/DL (ref 74–99)
GLUCOSE BLD MANUAL STRIP-MCNC: 89 MG/DL (ref 74–99)
GLUCOSE SERPL-MCNC: 93 MG/DL (ref 74–99)
HBA1C MFR BLD: 5.7 %
HBA1C MFR BLD: 5.8 %
HCT VFR BLD AUTO: 36.5 % (ref 41–52)
HDLC SERPL-MCNC: 22.7 MG/DL
HGB BLD-MCNC: 12.7 G/DL (ref 13.5–17.5)
LDLC SERPL CALC-MCNC: 66 MG/DL
MCH RBC QN AUTO: 32.3 PG (ref 26–34)
MCHC RBC AUTO-ENTMCNC: 34.8 G/DL (ref 32–36)
MCV RBC AUTO: 93 FL (ref 80–100)
NON HDL CHOLESTEROL: 117 MG/DL (ref 0–149)
NRBC BLD-RTO: 0 /100 WBCS (ref 0–0)
PLATELET # BLD AUTO: 150 X10*3/UL (ref 150–450)
POTASSIUM SERPL-SCNC: 4 MMOL/L (ref 3.5–5.3)
RBC # BLD AUTO: 3.93 X10*6/UL (ref 4.5–5.9)
SODIUM SERPL-SCNC: 142 MMOL/L (ref 136–145)
TRIGL SERPL-MCNC: 258 MG/DL (ref 0–149)
VLDL: 52 MG/DL (ref 0–40)
WBC # BLD AUTO: 5.6 X10*3/UL (ref 4.4–11.3)

## 2024-02-23 PROCEDURE — 93971 EXTREMITY STUDY: CPT

## 2024-02-23 PROCEDURE — 2500000002 HC RX 250 W HCPCS SELF ADMINISTERED DRUGS (ALT 637 FOR MEDICARE OP, ALT 636 FOR OP/ED): Mod: MUE | Performed by: NURSE PRACTITIONER

## 2024-02-23 PROCEDURE — 2500000001 HC RX 250 WO HCPCS SELF ADMINISTERED DRUGS (ALT 637 FOR MEDICARE OP): Performed by: PHYSICIAN ASSISTANT

## 2024-02-23 PROCEDURE — 36415 COLL VENOUS BLD VENIPUNCTURE: CPT | Performed by: PHYSICIAN ASSISTANT

## 2024-02-23 PROCEDURE — 2500000005 HC RX 250 GENERAL PHARMACY W/O HCPCS: Performed by: PHYSICIAN ASSISTANT

## 2024-02-23 PROCEDURE — 70544 MR ANGIOGRAPHY HEAD W/O DYE: CPT | Mod: 59

## 2024-02-23 PROCEDURE — 2500000004 HC RX 250 GENERAL PHARMACY W/ HCPCS (ALT 636 FOR OP/ED): Performed by: PSYCHIATRY & NEUROLOGY

## 2024-02-23 PROCEDURE — 82947 ASSAY GLUCOSE BLOOD QUANT: CPT | Mod: 59

## 2024-02-23 PROCEDURE — 85027 COMPLETE CBC AUTOMATED: CPT | Performed by: PHYSICIAN ASSISTANT

## 2024-02-23 PROCEDURE — 80061 LIPID PANEL: CPT | Performed by: NURSE PRACTITIONER

## 2024-02-23 PROCEDURE — 70551 MRI BRAIN STEM W/O DYE: CPT

## 2024-02-23 PROCEDURE — 82374 ASSAY BLOOD CARBON DIOXIDE: CPT | Performed by: PHYSICIAN ASSISTANT

## 2024-02-23 PROCEDURE — 70547 MR ANGIOGRAPHY NECK W/O DYE: CPT

## 2024-02-23 PROCEDURE — 99223 1ST HOSP IP/OBS HIGH 75: CPT | Performed by: PSYCHIATRY & NEUROLOGY

## 2024-02-23 PROCEDURE — 2500000002 HC RX 250 W HCPCS SELF ADMINISTERED DRUGS (ALT 637 FOR MEDICARE OP, ALT 636 FOR OP/ED): Mod: MUE | Performed by: PHYSICIAN ASSISTANT

## 2024-02-23 PROCEDURE — 83036 HEMOGLOBIN GLYCOSYLATED A1C: CPT | Mod: AHULAB | Performed by: PHYSICIAN ASSISTANT

## 2024-02-23 PROCEDURE — 2500000004 HC RX 250 GENERAL PHARMACY W/ HCPCS (ALT 636 FOR OP/ED): Performed by: PHYSICIAN ASSISTANT

## 2024-02-23 PROCEDURE — 70547 MR ANGIOGRAPHY NECK W/O DYE: CPT | Performed by: RADIOLOGY

## 2024-02-23 PROCEDURE — 70544 MR ANGIOGRAPHY HEAD W/O DYE: CPT | Performed by: RADIOLOGY

## 2024-02-23 PROCEDURE — G0378 HOSPITAL OBSERVATION PER HR: HCPCS

## 2024-02-23 PROCEDURE — 83036 HEMOGLOBIN GLYCOSYLATED A1C: CPT | Mod: AHULAB | Performed by: NURSE PRACTITIONER

## 2024-02-23 PROCEDURE — 70551 MRI BRAIN STEM W/O DYE: CPT | Performed by: RADIOLOGY

## 2024-02-23 PROCEDURE — 2500000001 HC RX 250 WO HCPCS SELF ADMINISTERED DRUGS (ALT 637 FOR MEDICARE OP): Performed by: NURSE PRACTITIONER

## 2024-02-23 RX ORDER — TAMSULOSIN HYDROCHLORIDE 0.4 MG/1
0.4 CAPSULE ORAL 2 TIMES DAILY
Status: DISCONTINUED | OUTPATIENT
Start: 2024-02-23 | End: 2024-02-25 | Stop reason: HOSPADM

## 2024-02-23 RX ORDER — MECLIZINE HYDROCHLORIDE 25 MG/1
25 TABLET ORAL 3 TIMES DAILY PRN
Status: DISCONTINUED | OUTPATIENT
Start: 2024-02-23 | End: 2024-02-24

## 2024-02-23 RX ORDER — INSULIN LISPRO 100 [IU]/ML
0-10 INJECTION, SOLUTION INTRAVENOUS; SUBCUTANEOUS
Status: DISCONTINUED | OUTPATIENT
Start: 2024-02-23 | End: 2024-02-25 | Stop reason: HOSPADM

## 2024-02-23 RX ADMIN — MECLIZINE HYDROCHLORIDE 25 MG: 25 TABLET ORAL at 12:36

## 2024-02-23 RX ADMIN — INSULIN LISPRO 4 UNITS: 100 INJECTION, SOLUTION INTRAVENOUS; SUBCUTANEOUS at 17:29

## 2024-02-23 RX ADMIN — GEMFIBROZIL 600 MG: 600 TABLET ORAL at 20:05

## 2024-02-23 RX ADMIN — PANTOPRAZOLE SODIUM 40 MG: 40 TABLET, DELAYED RELEASE ORAL at 09:07

## 2024-02-23 RX ADMIN — GEMFIBROZIL 600 MG: 600 TABLET ORAL at 00:24

## 2024-02-23 RX ADMIN — TAMSULOSIN HYDROCHLORIDE 0.4 MG: 0.4 CAPSULE ORAL at 20:05

## 2024-02-23 RX ADMIN — GLIPIZIDE 20 MG: 2.5 TABLET, EXTENDED RELEASE ORAL at 09:07

## 2024-02-23 RX ADMIN — GEMFIBROZIL 600 MG: 600 TABLET ORAL at 09:07

## 2024-02-23 RX ADMIN — SENNOSIDES 17.2 MG: 8.6 TABLET, FILM COATED ORAL at 00:24

## 2024-02-23 RX ADMIN — ASPIRIN 81 MG: 81 TABLET, COATED ORAL at 09:07

## 2024-02-23 RX ADMIN — TAMSULOSIN HYDROCHLORIDE 0.4 MG: 0.4 CAPSULE ORAL at 12:36

## 2024-02-23 RX ADMIN — Medication 3 MG: at 20:05

## 2024-02-23 RX ADMIN — Medication 3 MG: at 00:24

## 2024-02-23 RX ADMIN — FAMOTIDINE 20 MG: 20 TABLET, FILM COATED ORAL at 20:05

## 2024-02-23 RX ADMIN — DEXAMETHASONE 5 MG: 4 TABLET ORAL at 16:12

## 2024-02-23 RX ADMIN — ENOXAPARIN SODIUM 40 MG: 40 INJECTION SUBCUTANEOUS at 23:32

## 2024-02-23 RX ADMIN — ATENOLOL 50 MG: 50 TABLET ORAL at 09:07

## 2024-02-23 RX ADMIN — SITAGLIPTIN 100 MG: 100 TABLET, FILM COATED ORAL at 09:07

## 2024-02-23 RX ADMIN — ONDANSETRON 4 MG: 4 TABLET, ORALLY DISINTEGRATING ORAL at 12:36

## 2024-02-23 RX ADMIN — FAMOTIDINE 20 MG: 20 TABLET, FILM COATED ORAL at 00:23

## 2024-02-23 RX ADMIN — ENOXAPARIN SODIUM 40 MG: 40 INJECTION SUBCUTANEOUS at 00:23

## 2024-02-23 SDOH — SOCIAL STABILITY: SOCIAL INSECURITY: DO YOU FEEL UNSAFE GOING BACK TO THE PLACE WHERE YOU ARE LIVING?: NO

## 2024-02-23 SDOH — SOCIAL STABILITY: SOCIAL INSECURITY: ARE YOU OR HAVE YOU BEEN THREATENED OR ABUSED PHYSICALLY, EMOTIONALLY, OR SEXUALLY BY ANYONE?: NO

## 2024-02-23 SDOH — SOCIAL STABILITY: SOCIAL INSECURITY: ABUSE: ADULT

## 2024-02-23 SDOH — SOCIAL STABILITY: SOCIAL INSECURITY: HAS ANYONE EVER THREATENED TO HURT YOUR FAMILY OR YOUR PETS?: NO

## 2024-02-23 SDOH — SOCIAL STABILITY: SOCIAL INSECURITY: WERE YOU ABLE TO COMPLETE ALL THE BEHAVIORAL HEALTH SCREENINGS?: YES

## 2024-02-23 SDOH — SOCIAL STABILITY: SOCIAL INSECURITY: ARE THERE ANY APPARENT SIGNS OF INJURIES/BEHAVIORS THAT COULD BE RELATED TO ABUSE/NEGLECT?: NO

## 2024-02-23 SDOH — SOCIAL STABILITY: SOCIAL INSECURITY: HAVE YOU HAD THOUGHTS OF HARMING ANYONE ELSE?: NO

## 2024-02-23 SDOH — SOCIAL STABILITY: SOCIAL INSECURITY: DOES ANYONE TRY TO KEEP YOU FROM HAVING/CONTACTING OTHER FRIENDS OR DOING THINGS OUTSIDE YOUR HOME?: NO

## 2024-02-23 SDOH — SOCIAL STABILITY: SOCIAL INSECURITY: DO YOU FEEL ANYONE HAS EXPLOITED OR TAKEN ADVANTAGE OF YOU FINANCIALLY OR OF YOUR PERSONAL PROPERTY?: NO

## 2024-02-23 ASSESSMENT — COGNITIVE AND FUNCTIONAL STATUS - GENERAL
MOBILITY SCORE: 24
MOBILITY SCORE: 24
PATIENT BASELINE BEDBOUND: NO
DAILY ACTIVITIY SCORE: 24
DAILY ACTIVITIY SCORE: 24
MOBILITY SCORE: 24
DAILY ACTIVITIY SCORE: 24

## 2024-02-23 ASSESSMENT — ACTIVITIES OF DAILY LIVING (ADL)
PATIENT'S MEMORY ADEQUATE TO SAFELY COMPLETE DAILY ACTIVITIES?: YES
FEEDING YOURSELF: INDEPENDENT
HEARING - RIGHT EAR: FUNCTIONAL
ASSISTIVE_DEVICE: EYEGLASSES
ADEQUATE_TO_COMPLETE_ADL: YES
GROOMING: INDEPENDENT
HEARING - LEFT EAR: FUNCTIONAL
TOILETING: INDEPENDENT
WALKS IN HOME: INDEPENDENT
BATHING: INDEPENDENT
LACK_OF_TRANSPORTATION: NO
DRESSING YOURSELF: INDEPENDENT
JUDGMENT_ADEQUATE_SAFELY_COMPLETE_DAILY_ACTIVITIES: YES

## 2024-02-23 ASSESSMENT — LIFESTYLE VARIABLES
AUDIT-C TOTAL SCORE: 1
HOW OFTEN DO YOU HAVE 6 OR MORE DRINKS ON ONE OCCASION: NEVER
SUBSTANCE_ABUSE_PAST_12_MONTHS: NO
HOW OFTEN DO YOU HAVE A DRINK CONTAINING ALCOHOL: MONTHLY OR LESS
HOW MANY STANDARD DRINKS CONTAINING ALCOHOL DO YOU HAVE ON A TYPICAL DAY: 1 OR 2
PRESCIPTION_ABUSE_PAST_12_MONTHS: NO
AUDIT-C TOTAL SCORE: 1
SKIP TO QUESTIONS 9-10: 1

## 2024-02-23 ASSESSMENT — ENCOUNTER SYMPTOMS
NAUSEA: 1
FEVER: 0
HEADACHES: 1
CHEST TIGHTNESS: 0
SHORTNESS OF BREATH: 0
DIZZINESS: 1
ABDOMINAL PAIN: 0

## 2024-02-23 ASSESSMENT — PATIENT HEALTH QUESTIONNAIRE - PHQ9
2. FEELING DOWN, DEPRESSED OR HOPELESS: NOT AT ALL
SUM OF ALL RESPONSES TO PHQ9 QUESTIONS 1 & 2: 0
1. LITTLE INTEREST OR PLEASURE IN DOING THINGS: NOT AT ALL

## 2024-02-23 ASSESSMENT — PAIN SCALES - GENERAL: PAINLEVEL_OUTOF10: 0 - NO PAIN

## 2024-02-23 ASSESSMENT — PAIN - FUNCTIONAL ASSESSMENT: PAIN_FUNCTIONAL_ASSESSMENT: 0-10

## 2024-02-23 NOTE — CARE PLAN
The patient's goals for the shift include free of injury    The clinical goals for the shift include Remain free of falls    Over the shift, the patient did not make progress toward the following goals. Barriers to progression include discomfort. Recommendations to address these barriers include increase comfort.

## 2024-02-23 NOTE — PROGRESS NOTES
Pharmacy Medication History Review    Edward D Hume is a 73 y.o. male admitted for Dizziness. Pharmacy reviewed the patient's [id number] medications and allergies for accuracy.    Below are additional concerns with the patient's PTA list.  Collected from patient.     The list below reflectives the updated PTA list. Please review each medication in order reconciliation for additional clarification and justification.    Medications Prior to Admission   Medication Sig Dispense Refill Last Dose    albuterol 90 mcg/actuation inhaler Inhale 2 puffs every 6 hours if needed for shortness of breath. 18 g 2     allopurinol (Zyloprim) 300 mg tablet Take 1 tablet (300 mg) by mouth once daily. 30 tablet 4     amLODIPine (Norvasc) 10 mg tablet Take 1 tablet (10 mg) by mouth once daily. 30 tablet 4     aspirin 81 mg EC tablet Take 1 tablet (81 mg) by mouth once daily. 30 tablet 4     atenolol (Tenormin) 50 mg tablet Take 1 tablet (50 mg) by mouth once daily. 30 tablet 4     biotin 10 mg tablet Take 1 tablet (10 mg) by mouth once daily.       blood sugar diagnostic (Accu-Chek Kamala Plus test strp) strip 1 strip 2 times a day.       cranberry fruit extract (cranberry extract) 300 mg tablet Take 650 mg by mouth once daily.       famotidine (Pepcid) 20 mg tablet Take 1 tablet (20 mg) by mouth once daily at bedtime. 30 tablet 4     gemfibrozil (Lopid) 600 mg tablet Take 1 tablet (600 mg) by mouth 2 times a day. 30 tablet 4     glipiZIDE XL (Glucotrol XL) 10 mg 24 hr tablet Take 2 tablets (20 mg) by mouth once daily. 60 tablet 2     ibuprofen 200 mg tablet Take 1 tablet (200 mg) by mouth every 6 hours if needed.       insulin glargine (Toujeo SoloStar U-300 Insulin) 300 unit/mL (1.5 mL) injection Inject 45 Units under the skin once daily in the morning. 45 units max (Patient taking differently: Inject 36 Units under the skin once daily in the morning. 36 units daily) 13.5 mL 0     Januvia 100 mg tablet Take 1 tablet (100 mg) by  "mouth once daily. 30 tablet 3     ketoconazole (NIZOral) 2 % cream Apply topically 2 times a day. To affected areas of ears 60 g 11     omeprazole (PriLOSEC) 40 mg DR capsule Take 1 capsule (40 mg) by mouth once daily in the morning. Take before meals. 30 capsule 4     pen needle, diabetic (BD Ultra-Fine Short Pen Needle) 31 gauge x 5/16\" needle USE AS DIRECTED 100 each 3     tamsulosin (Flomax) 0.4 mg 24 hr capsule Take 1 capsule (0.4 mg) by mouth 2 times a day. 180 capsule 0     valsartan-hydrochlorothiazide (Diovan-HCT) 160-12.5 mg tablet Take 1 tablet by mouth once daily. 30 tablet 4               Nevaeh Hess, PharmD    "

## 2024-02-23 NOTE — PROGRESS NOTES
02/23/24 1351   Discharge Planning   Assistance Needed No home going needs identified   Type of Residence Private residence   Home or Post Acute Services None   Patient expects to be discharged to: Home     Met with patient and wife at bedside.  Reviewed list of vestibular therapy locations.  Patient is aware that he will need to call and schedule.  Discussed patient's concern that is symptoms are related to a possible bite on his ankle.  Discussed patient's concerns with NP.

## 2024-02-23 NOTE — CONSULTS
"INITIAL NEUROLOGY CONSULT NOTE    IMPRESSION:  Peripiheral vertigo referable to the right ear.  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  Presentation not suggestive of stroke.    RECOMMENDATIONS:  I performed the Epley canalith repositioning maneuver, ordered single dose dexamethasone, and taught the patient the Jackson-Daroff exercises.  Can be discharged form the neurological standpoint if MRI reveals no actionable pathology.    Alexei Talbert Jr., M.D., FAAN       History Of Present Illness  Edward D Hume is a 73 y.o. male presenting with \"dizziness\".    He reports six days of \"dizziness\".  By \"dizzy\", he means he has two symptoms.  He reports positional vertigo that is worse when he is lying down.  The other symptom is lightheaded with mild nausea, dyspnea, but no sensation of heat, diaphoresis, chest pressure/pain, or palpitations.    Of note is that he has had at ten years of episodic skew diplopia, for which he uses readers.    He denies other focal neurological symptoms including dysarthria, dysphagia, diplopia, focal weakness, focal sensory change, ataxia, or bowel/bladder incontinence, among others, in the context of the episode.    He reports bilateral tinnitus, but no ear pain, pressure, or tenderness.  Past Medical History  Past Medical History:   Diagnosis Date    Acute nonparalytic poliomyelitis     Acute nonparalytic polio    Body mass index (BMI) 25.0-25.9, adult 10/26/2019    Body mass index (BMI) of 25.0 to 25.9 in adult    Body mass index (BMI) 27.0-27.9, adult 09/22/2021    Body mass index (BMI) of 27.0 to 27.9 in adult    Body mass index (BMI) 29.0-29.9, adult 08/31/2019    Body mass index (BMI) of 29.0 to 29.9 in adult    Crushing injury of unspecified finger(s), initial encounter 06/12/2014    Injury, crush, finger    Disorder of the skin and subcutaneous tissue, unspecified 09/04/2019    Skin lesion    Disorder of the skin and subcutaneous tissue, " unspecified 03/30/2016    Skin lesion    Elevated prostate specific antigen (PSA)     Elevated prostate specific antigen (PSA)    History of falling 06/27/2016    History of fall    Other chest pain 03/07/2018    Chest pressure    Other conditions influencing health status 02/22/2017    Type 2 diabetes mellitus, uncontrolled    Other conditions influencing health status 03/04/2016    Epicondylitis    Other conditions influencing health status     Nephrolithiasis    Personal history of other mental and behavioral disorders 11/06/2018    History of depression    Personal history of other specified conditions 04/16/2019    History of dizziness    Personal history of other specified conditions 04/16/2019    History of weight loss    Personal history of other specified conditions 06/29/2016    History of abdominal pain    Puncture wound without foreign body of left hand, initial encounter 03/05/2019    Puncture wound of hand, left    Puncture wound without foreign body of unspecified hand, initial encounter 03/05/2019    Puncture wound, hand    Superficial mycosis, unspecified 12/01/2014    Fungal otitis externa    Trigger finger, left middle finger 07/24/2017    Trigger middle finger of left hand    Trigger finger, left middle finger 07/24/2017    Trigger middle finger of left hand    Trigger finger, left ring finger 07/24/2017    Trigger ring finger of left hand    Trigger finger, right index finger 07/24/2017    Trigger index finger of right hand    Trigger finger, right middle finger 11/10/2017    Trigger middle finger of right hand     Surgical History  Past Surgical History:   Procedure Laterality Date    HAND TENDON SURGERY  02/15/2013    Hand Incision Tendon Sheath Of A Finger    OTHER SURGICAL HISTORY  04/09/2013    Needle Biopsy Of Prostate    OTHER SURGICAL HISTORY  07/02/2020    Retinal detachment repair    OTHER SURGICAL HISTORY  12/06/2017    Upper Gastrointestinal Endoscopy, Simple Primary Exam    OTHER  SURGICAL HISTORY  06/12/2013    Repair Of Retinal Detachment Procedure Detail    OTHER SURGICAL HISTORY  06/12/2013    Surg Rad Resect Tonsil/Tonsil Pillars/Retromol Tri W/O Closure    SHOULDER SURGERY  06/12/2013    Shoulder Surgery     Social History  Social History     Tobacco Use    Smoking status: Former     Types: Cigarettes    Smokeless tobacco: Never   Substance Use Topics    Alcohol use: Yes     Comment: occ    Drug use: Never       Scheduled medications  [Held by provider] amLODIPine, 10 mg, oral, Daily  aspirin, 81 mg, oral, Daily  atenolol, 50 mg, oral, Daily  enoxaparin, 40 mg, subcutaneous, q24h  famotidine, 20 mg, oral, Nightly  gemfibrozil, 600 mg, oral, BID  glipiZIDE XL, 20 mg, oral, Daily  insulin glargine, 36 Units, subcutaneous, Daily  melatonin, 3 mg, oral, Daily  pantoprazole, 40 mg, oral, Daily before breakfast   Or  pantoprazole, 40 mg, intravenous, Daily before breakfast  SITagliptin phosphate, 100 mg, oral, Daily  [Held by provider] valsartan 160 mg, hydroCHLOROthiazide 12.5 mg for Diovan HCT, , oral, Daily      Continuous medications     PRN medications  PRN medications: acetaminophen **OR** acetaminophen **OR** acetaminophen, albuterol, dextrose 10 % in water (D10W), dextrose, glucagon, ondansetron ODT **OR** ondansetron      No family history on file.  Allergies  Amoxicillin, Metformin, Valsartan, and Iodinated contrast media  Medications Prior to Admission   Medication Sig Dispense Refill Last Dose    albuterol 90 mcg/actuation inhaler Inhale 2 puffs every 6 hours if needed for shortness of breath. 18 g 2     allopurinol (Zyloprim) 300 mg tablet Take 1 tablet (300 mg) by mouth once daily. 30 tablet 4     amLODIPine (Norvasc) 10 mg tablet Take 1 tablet (10 mg) by mouth once daily. 30 tablet 4     aspirin 81 mg EC tablet Take 1 tablet (81 mg) by mouth once daily. 30 tablet 4     atenolol (Tenormin) 50 mg tablet Take 1 tablet (50 mg) by mouth once daily. 30 tablet 4     biotin 10 mg  "tablet Take 1 tablet (10 mg) by mouth once daily.       blood sugar diagnostic (Accu-Chek Kamala Plus test strp) strip 1 strip 2 times a day.       cranberry fruit extract (cranberry extract) 300 mg tablet Take 650 mg by mouth once daily.       famotidine (Pepcid) 20 mg tablet Take 1 tablet (20 mg) by mouth once daily at bedtime. 30 tablet 4     gemfibrozil (Lopid) 600 mg tablet Take 1 tablet (600 mg) by mouth 2 times a day. 30 tablet 4     glipiZIDE XL (Glucotrol XL) 10 mg 24 hr tablet Take 2 tablets (20 mg) by mouth once daily. 60 tablet 2     ibuprofen 200 mg tablet Take 1 tablet (200 mg) by mouth every 6 hours if needed.       insulin glargine (Toujeo SoloStar U-300 Insulin) 300 unit/mL (1.5 mL) injection Inject 45 Units under the skin once daily in the morning. 45 units max (Patient taking differently: Inject 36 Units under the skin once daily in the morning. 36 units daily) 13.5 mL 0     Januvia 100 mg tablet Take 1 tablet (100 mg) by mouth once daily. 30 tablet 3     ketoconazole (NIZOral) 2 % cream Apply topically 2 times a day. To affected areas of ears 60 g 11     omeprazole (PriLOSEC) 40 mg DR capsule Take 1 capsule (40 mg) by mouth once daily in the morning. Take before meals. 30 capsule 4     pen needle, diabetic (BD Ultra-Fine Short Pen Needle) 31 gauge x 5/16\" needle USE AS DIRECTED 100 each 3     tamsulosin (Flomax) 0.4 mg 24 hr capsule Take 1 capsule (0.4 mg) by mouth 2 times a day. 180 capsule 0     valsartan-hydrochlorothiazide (Diovan-HCT) 160-12.5 mg tablet Take 1 tablet by mouth once daily. 30 tablet 4        Scheduled medications  [Held by provider] amLODIPine, 10 mg, oral, Daily  aspirin, 81 mg, oral, Daily  atenolol, 50 mg, oral, Daily  enoxaparin, 40 mg, subcutaneous, q24h  famotidine, 20 mg, oral, Nightly  gemfibrozil, 600 mg, oral, BID  glipiZIDE XL, 20 mg, oral, Daily  insulin glargine, 36 Units, subcutaneous, Daily  melatonin, 3 mg, oral, Daily  pantoprazole, 40 mg, oral, Daily before " breakfast   Or  pantoprazole, 40 mg, intravenous, Daily before breakfast  SITagliptin phosphate, 100 mg, oral, Daily  [Held by provider] valsartan 160 mg, hydroCHLOROthiazide 12.5 mg for Diovan HCT, , oral, Daily      Continuous medications     PRN medications  PRN medications: acetaminophen **OR** acetaminophen **OR** acetaminophen, albuterol, dextrose 10 % in water (D10W), dextrose, glucagon, ondansetron ODT **OR** ondansetron    Review of Systems   All other systems reviewed and are negative.      Last Recorded Vitals  Blood pressure 141/74, pulse 62, temperature 36.2 °C (97.2 °F), temperature source Temporal, resp. rate 18, height 1.829 m (6'), weight 90.7 kg (200 lb), SpO2 95 %.    CONSTITUTIONAL:  No acute distress    HALLPIKE MANEUVER elicits mild rotatory nystagmus with head turn, two second delay to start, extinguishes within a few beats.  CARDIOVASCULAR:  Normal pulses in the distal legs, no edema of either arm or either leg.  No carotid bruits.    MENTAL STATUS:  Awake, alert, fully oriented to self, place, and time, with present short-term memory, good awareness of recent events, normal attention span, concentration, and fund of knowledge.    SPEECH AND LANGUAGE:  Can name and repeat, follows all commands, has no dysarthria    FUNDOSCOPIC:  No papilledema    CRANIAL NERVES:  II-Vision present, visual fields full to confrontational testing    III/IV/VI--EOMs are present in all directions except right eye does not abduct fully and induces diplopia.  Pupils are symmetrically reactive in dim light.  No ptosis.    V--Normal facial sensation.    VII--No facial asymmetry.    VIII--Hearing present to voice bilaterally.    IX/X--Symmetric soft palate rise.    XI--Normal trapezius power bilaterally.    XII--Tongue protrudes without deviation.    MOTOR:  Normal power, tone, and bulk in both arms and both legs.    SENSORY:  Normal pin sensation in both arms and both legs without distal-proximal gradient, asymmetry,  or spinal sensory level.    COORDINATION:  Normal finger-to-nose and heel-to-shin testing in both arms and both legs.    REFLEXES are normal and symmetric at the biceps, triceps, brachioradialis, patella, and ankle.  The plantar responses are flexor.    GAIT is normal, without steppage, ataxia, shuffling, or spasticity.    Relevant Results  Results for orders placed or performed during the hospital encounter of 02/22/24 (from the past 24 hour(s))   ECG 12 lead   Result Value Ref Range    Ventricular Rate 63 BPM    Atrial Rate 63 BPM    FL Interval 190 ms    QRS Duration 104 ms    QT Interval 412 ms    QTC Calculation(Bazett) 421 ms    P Axis 74 degrees    R Axis 60 degrees    T Axis 63 degrees    QRS Count 10 beats    Q Onset 209 ms    P Onset 114 ms    P Offset 168 ms    T Offset 415 ms    QTC Fredericia 418 ms   CBC with Differential   Result Value Ref Range    WBC 5.8 4.4 - 11.3 x10*3/uL    nRBC 0.0 0.0 - 0.0 /100 WBCs    RBC 4.35 (L) 4.50 - 5.90 x10*6/uL    Hemoglobin 13.5 13.5 - 17.5 g/dL    Hematocrit 39.9 (L) 41.0 - 52.0 %    MCV 92 80 - 100 fL    MCH 31.0 26.0 - 34.0 pg    MCHC 33.8 32.0 - 36.0 g/dL    RDW 12.2 11.5 - 14.5 %    Platelets 170 150 - 450 x10*3/uL    Neutrophils % 66.5 40.0 - 80.0 %    Immature Granulocytes %, Automated 1.2 (H) 0.0 - 0.9 %    Lymphocytes % 19.7 13.0 - 44.0 %    Monocytes % 9.0 2.0 - 10.0 %    Eosinophils % 3.3 0.0 - 6.0 %    Basophils % 0.3 0.0 - 2.0 %    Neutrophils Absolute 3.85 1.60 - 5.50 x10*3/uL    Immature Granulocytes Absolute, Automated 0.07 0.00 - 0.50 x10*3/uL    Lymphocytes Absolute 1.14 0.80 - 3.00 x10*3/uL    Monocytes Absolute 0.52 0.05 - 0.80 x10*3/uL    Eosinophils Absolute 0.19 0.00 - 0.40 x10*3/uL    Basophils Absolute 0.02 0.00 - 0.10 x10*3/uL   Comprehensive Metabolic Panel   Result Value Ref Range    Glucose 123 (H) 74 - 99 mg/dL    Sodium 138 136 - 145 mmol/L    Potassium 3.9 3.5 - 5.3 mmol/L    Chloride 103 98 - 107 mmol/L    Bicarbonate 27 21 - 32 mmol/L     Anion Gap 12 10 - 20 mmol/L    Urea Nitrogen 29 (H) 6 - 23 mg/dL    Creatinine 1.43 (H) 0.50 - 1.30 mg/dL    eGFR 52 (L) >60 mL/min/1.73m*2    Calcium 9.0 8.6 - 10.3 mg/dL    Albumin 4.2 3.4 - 5.0 g/dL    Alkaline Phosphatase 51 33 - 136 U/L    Total Protein 7.1 6.4 - 8.2 g/dL    AST 14 9 - 39 U/L    Bilirubin, Total 0.5 0.0 - 1.2 mg/dL    ALT 14 10 - 52 U/L   Troponin I, High Sensitivity, Initial   Result Value Ref Range    Troponin I, High Sensitivity 4 0 - 20 ng/L   POCT GLUCOSE   Result Value Ref Range    POCT Glucose 148 (H) 74 - 99 mg/dL   Troponin, High Sensitivity, 1 Hour   Result Value Ref Range    Troponin I, High Sensitivity 4 0 - 20 ng/L   Hemoglobin A1C   Result Value Ref Range    Hemoglobin A1C 5.7 (H) see below %    Estimated Average Glucose 117 Not Established mg/dL   CBC   Result Value Ref Range    WBC 5.6 4.4 - 11.3 x10*3/uL    nRBC 0.0 0.0 - 0.0 /100 WBCs    RBC 3.93 (L) 4.50 - 5.90 x10*6/uL    Hemoglobin 12.7 (L) 13.5 - 17.5 g/dL    Hematocrit 36.5 (L) 41.0 - 52.0 %    MCV 93 80 - 100 fL    MCH 32.3 26.0 - 34.0 pg    MCHC 34.8 32.0 - 36.0 g/dL    RDW 12.1 11.5 - 14.5 %    Platelets 150 150 - 450 x10*3/uL   Basic metabolic panel   Result Value Ref Range    Glucose 93 74 - 99 mg/dL    Sodium 142 136 - 145 mmol/L    Potassium 4.0 3.5 - 5.3 mmol/L    Chloride 105 98 - 107 mmol/L    Bicarbonate 27 21 - 32 mmol/L    Anion Gap 14 10 - 20 mmol/L    Urea Nitrogen 30 (H) 6 - 23 mg/dL    Creatinine 1.32 (H) 0.50 - 1.30 mg/dL    eGFR 58 (L) >60 mL/min/1.73m*2    Calcium 9.1 8.6 - 10.3 mg/dL   Lipid Panel   Result Value Ref Range    Cholesterol 140 0 - 199 mg/dL    HDL-Cholesterol 22.7 mg/dL    Cholesterol/HDL Ratio 6.2     LDL Calculated 66 <=99 mg/dL    VLDL 52 (H) 0 - 40 mg/dL    Triglycerides 258 (H) 0 - 149 mg/dL    Non HDL Cholesterol 117 0 - 149 mg/dL   POCT GLUCOSE   Result Value Ref Range    POCT Glucose 89 74 - 99 mg/dL         I have personally reviewed the following imaging results:  CT head wo  IV contrast    Result Date: 2/22/2024  Interpreted By:  Efraín Melvin, STUDY: CT HEAD WO IV CONTRAST;  2/22/2024 6:32 pm   INDICATION: Signs/Symptoms:Dizzy. Difficulty with ambulation..   COMPARISON: 03/26/2023   ACCESSION NUMBER(S): [id number]   ORDERING CLINICIAN: KAY AN   TECHNIQUE: Noncontrast axial CT images of head were obtained with coronal and sagittal reconstructed images.   FINDINGS: BRAIN PARENCHYMA: Mild periventricular and subcortical hemispheric white matter hypodensities are most compatible with chronic small vessel ischemic disease.Chronic lacunar infarcts in the right posterior limb internal capsule. No acute intraparenchymal hemorrhage or parenchymal evidence of acute large territory ischemic infarct. No mass-effect. Gray-white matter distinction is preserved.   VENTRICLES and EXTRA-AXIAL SPACES:  No acute extra-axial or intraventricular hemorrhage. No effacement of cerebral sulci. Ventricles and sulci are age-concordant.   PARANASAL SINUSES/MASTOIDS:  No hemorrhage or air-fluid levels within the visualized paranasal sinuses. The mastoids are well aerated.   CALVARIUM/ORBITS:  No skull fracture.  The orbits and globes are intact to the extent visualized.   EXTRACRANIAL SOFT TISSUES: No discernible abnormality.       1. No acute intracranial abnormality.   2. Mild burden of supratentorial chronic small vessel ischemic disease.     MACRO: None.   Signed by: Efraín Melvin 2/22/2024 6:47 PM Dictation workstation:   [id number]    No MRI head results found for the past 14 days   No results found for this or any previous visit.      Assessment/Plan   Principal Problem:    Dizziness  Active Problems:    Vertigo    Acute on chronic renal failure (CMS/[facility])            Alexei Talbert Jr., M.D., FAAN

## 2024-02-23 NOTE — PROGRESS NOTES
"Edward D Hume is a 73 y.o. male on day 0 of admission presenting with Dizziness.      Subjective   Pt sitting up on edge of bed, reports ongoing \"dizziness\" and headache. Describes headache as \"different than a normal headache\" but unable to give a good description. Dizziness worse when laying down. Reports nausea; denies vomiting/diarrhea.     Review of Systems   Constitutional:  Negative for fever.   HENT:  Positive for tinnitus.         Chronic bilateral tinnitus   Respiratory:  Negative for chest tightness and shortness of breath.    Cardiovascular:  Negative for chest pain.   Gastrointestinal:  Positive for nausea. Negative for abdominal pain.   Neurological:  Positive for dizziness and headaches.             Objective     Last Recorded Vitals  /74 (BP Location: Left arm, Patient Position: Sitting)   Pulse 62   Temp 36.2 °C (97.2 °F) (Temporal)   Resp 18   Wt 90.7 kg (200 lb)   SpO2 95%   Intake/Output last 3 Shifts:  No intake or output data in the 24 hours ending 02/23/24 1411    Admission Weight  Weight: 90.7 kg (200 lb) (02/22/24 1240)    Daily Weight  02/22/24 : 90.7 kg (200 lb)    Image Results  Lower extremity venous duplex left  Narrative: STUDY:  Left lower extremity venous ultrasound; Completed Time: 2/23/2024  13:21  INDICATION:  Left ankle swelling x1 day.  Possible spider bite.  COMPARISON:  None available.  ACCESSION NUMBER(S):  [id number]  ORDERING CLINICIAN:  IBAN GARCIA  TECHNIQUE:  Ultrasound of the left lower extremity veins.  Multiple images were  obtained.  FINDINGS:  The left common femoral, femoral, and popliteal veins demonstrated  normal compressibility, normal phasic venous flow, and normal response  to augmentation.  There is no evidence for echogenic thrombi.  The  visualized deep calf veins are patent.  The contralateral common femoral vein is free of thrombosis.  Impression: No evidence for DVT within the left lower extremity.    Signed by Dashawn Castrejon, " MD      Physical Exam  Constitutional:       General: He is not in acute distress.     Appearance: Normal appearance.   HENT:      Head: Normocephalic and atraumatic.   Eyes:      Conjunctiva/sclera: Conjunctivae normal.   Cardiovascular:      Rate and Rhythm: Normal rate and regular rhythm.      Pulses: Normal pulses.      Heart sounds: Normal heart sounds. No murmur heard.  Pulmonary:      Effort: Pulmonary effort is normal.      Breath sounds: Normal breath sounds.   Abdominal:      General: Abdomen is flat.      Palpations: Abdomen is soft.      Tenderness: There is no abdominal tenderness.   Musculoskeletal:      Cervical back: Normal range of motion.      Left lower le+ Edema present.   Skin:     General: Skin is warm and dry.   Neurological:      Mental Status: He is alert.         Relevant Results               Assessment/Plan      Edward Hume is a 73 year old male with PMHx of HTN, HLD, DM type 2, CKD , BPH, moderate to severe spinal stenosis at c5-6, and GERD. Presented to [facility] ED with 5 day history of dizziness and vertigo. CT head in ED show no acute process but shows chronic small vessel disease. MRI ordered. VSS; He was admitted to observation for neuro consult.       Principal Problem:    Dizziness  Active Problems:    Vertigo    Acute on chronic renal failure (CMS/HCC)    #probable peripheral vertigo  #frontal headache, ? Migraine variant  #known moderate to severe spinal stenosis at c5-6  - CT head negative for acute process  - MRI pending  - Neuro consult- presentation not suggestive of stroke, ordered single dose of dex  - Zofran, meclizine as needed  - orthostatics for completeness  - monitor for headache resolution, consider intervention if persists    #hypertension  - hold norvasc and diovan until r/o stroke  - continue atenolol    #DM type 2  - continue home meds glipizide, Januvia, lantus  - add corrective insulin scale    #mild LLE edema, possible bug bite per pt  - ultrasound negative  for clot    #HLD  - continue gemfibrozil    #GERD  - pepcid and protonix    #CKD  - creat 1.32- near baseline         DVT ppx: lovenox    Dispo: home if MRI negative; would benefit from outpatient vestibular rehab- sw to assist       Chelly Yanez RN- GIOVANNY Student

## 2024-02-23 NOTE — H&P
History Of Present Illness  Edward D Hume is a 73 y.o. male presenting with vertigo, lightheadedness, acute on chronic renal failure.  Patient has a history of type 2 diabetes, hypertension, gout, hyperlipidemia etc.  He states he has had intermittent room spinning vertigo that started 5 days ago..  He has underlying constant lightheadedness.  Patient states he feels like he is drunk but he has not been drinking.  Patient states the dizziness and vertigo vary in intensity.  He has had mild intermittent nausea but no vomiting.  He has a mild headache today.  He has chronic intermittent diarrhea and frequent stools but that is again chronic for him.  He does complain of intermittent slight burning sensation to the left medial distal lower leg.  He does not have it currently.  No other symptoms..    In the emergency department he had a CAT scan of his head that showed no acute process but he did have chronic small vessel ischemic disease.  The ED attending was concerned because the patient appeared to have a wide gait and could not walk heel-to-toe.  He wanted him admitted for neuro consult and the patient was admitted observation status.  Labs revealed a metabolic panel that was within normal limits other than a glucose of 123 BUN of 29, creatinine of 1.43, and GFR 52..  The patient's BUN was 25 and the creatinine was 1.37 in September 2023.  Patient's troponins were 4 and 4.  CBC showed a normal white count and no left shift.  Hematocrit was 39.9, hemoglobin 13.5, platelets were within normal limits.    Past medical history: Type 2 diabetes, hypertension, osteoarthritis, GERD, gout, hyperlipidemia, asthma, polio as a child, chronic intermittent diarrhea with 4-5 stools a day typically.    Medications: Pepcid, Prilosec, aspirin, allopurinol, Lopid, amlodipine, atenolol, Diovan, Januvia, Toujeo, glipizide.  Patient takes his Toujeo insulin 36 units in the morning.    Allergies: Amoxicillin, metformin, CT  contrast.    Social history: Patient is an ex-smoker and quit 20 or 30 years ago.  Drinks alcohol infrequently     Past Medical History:   Diagnosis Date    Acute nonparalytic poliomyelitis     Acute nonparalytic polio    Body mass index (BMI) 25.0-25.9, adult 10/26/2019    Body mass index (BMI) of 25.0 to 25.9 in adult    Body mass index (BMI) 27.0-27.9, adult 09/22/2021    Body mass index (BMI) of 27.0 to 27.9 in adult    Body mass index (BMI) 29.0-29.9, adult 08/31/2019    Body mass index (BMI) of 29.0 to 29.9 in adult    Crushing injury of unspecified finger(s), initial encounter 06/12/2014    Injury, crush, finger    Disorder of the skin and subcutaneous tissue, unspecified 09/04/2019    Skin lesion    Disorder of the skin and subcutaneous tissue, unspecified 03/30/2016    Skin lesion    Elevated prostate specific antigen (PSA)     Elevated prostate specific antigen (PSA)    History of falling 06/27/2016    History of fall    Other chest pain 03/07/2018    Chest pressure    Other conditions influencing health status 02/22/2017    Type 2 diabetes mellitus, uncontrolled    Other conditions influencing health status 03/04/2016    Epicondylitis    Other conditions influencing health status     Nephrolithiasis    Personal history of other mental and behavioral disorders 11/06/2018    History of depression    Personal history of other specified conditions 04/16/2019    History of dizziness    Personal history of other specified conditions 04/16/2019    History of weight loss    Personal history of other specified conditions 06/29/2016    History of abdominal pain    Puncture wound without foreign body of left hand, initial encounter 03/05/2019    Puncture wound of hand, left    Puncture wound without foreign body of unspecified hand, initial encounter 03/05/2019    Puncture wound, hand    Superficial mycosis, unspecified 12/01/2014    Fungal otitis externa    Trigger finger, left middle finger 07/24/2017    Trigger  middle finger of left hand    Trigger finger, left middle finger 07/24/2017    Trigger middle finger of left hand    Trigger finger, left ring finger 07/24/2017    Trigger ring finger of left hand    Trigger finger, right index finger 07/24/2017    Trigger index finger of right hand    Trigger finger, right middle finger 11/10/2017    Trigger middle finger of right hand     Past Surgical History:   Procedure Laterality Date    HAND TENDON SURGERY  02/15/2013    Hand Incision Tendon Sheath Of A Finger    OTHER SURGICAL HISTORY  04/09/2013    Needle Biopsy Of Prostate    OTHER SURGICAL HISTORY  07/02/2020    Retinal detachment repair    OTHER SURGICAL HISTORY  12/06/2017    Upper Gastrointestinal Endoscopy, Simple Primary Exam    OTHER SURGICAL HISTORY  06/12/2013    Repair Of Retinal Detachment Procedure Detail    OTHER SURGICAL HISTORY  06/12/2013    Surg Rad Resect Tonsil/Tonsil Pillars/Retromol Tri W/O Closure    SHOULDER SURGERY  06/12/2013    Shoulder Surgery     Social History     Tobacco Use    Smoking status: Former     Types: Cigarettes    Smokeless tobacco: Never   Substance Use Topics    Alcohol use: Yes     Comment: occ    Drug use: Never        Family History  No family history on file.     Allergies  Amoxicillin, Metformin, Valsartan, and Iodinated contrast media    Review of Systems  Patient denies chest pain, shortness of breath, , vomiting, fever, chills,  constipation,  vision changes, rashes, dysuria,  cough or cold symptoms, or any other complaints at this time. A complete review of systems was done, and is as stated in the history of present illness, is otherwise negative or not pertinent to the complaint.    Physical Exam  Physical exam: Vital signs and nurses notes were reviewed.    General:  no acute distress. Alert and oriented  x 3.  Talks easily in full sentences    Head: atraumatic and normocephalic    Eyes: Pupils equal round reactive to light, EOMs are intact, conjunctivae is not  injected.    Oropharynx: No erythema or exudate noted, no trismus or drooling, buccal mucosa is moist.    Ears:  normal external exam, no swelling or erythema,     Nasal: normal external exam,     Neck: Supple, full range of motion,     Cardiac: Regular rate and rhythm. No murmurs noted.     Pulmonary: Lungs clear bilaterally with good aeration. No adventitious breath sounds. No wheezes rales or rhonchi. No accessory muscle use no retraction noted.    Abdomen: Soft,  Nontender. No guarding, rigidity, or distention. Normoactive bowel sounds. No pulsatile masses, no bruits.     Extremities:  Full range of motion.  No pitting edema, no tremors or waver.    Skin: No rash seen. Skin is warm and dry     Neuro: Patient is alert and oriented x3. Speech is clear. There is no asymmetry with facial grimaces, and no tongue deviation. Patient moves all extremities independently.  Patient can lift and hold each extremity up independently without waver.  He has a normal finger-to-nose, and a normal Romberg.  Very slight horizontal nystagmus with EOMs.   are strong and equal.  Dorsal and plantarflexion of the feet against resistance is +5 and equal.  Sensation is intact. No obvious neuro deficits are noted.     Last Recorded Vitals  /78 (BP Location: Right arm, Patient Position: Sitting)   Pulse 71   Temp 36.1 °C (97 °F) (Temporal)   Resp 19   Wt 90.7 kg (200 lb)   SpO2 97%     Relevant Results  Scheduled medications  [START ON 2/23/2024] amLODIPine, 10 mg, oral, Daily  [START ON 2/23/2024] aspirin, 81 mg, oral, Daily  [START ON 2/23/2024] atenolol, 50 mg, oral, Daily  enoxaparin, 40 mg, subcutaneous, q24h  famotidine, 20 mg, oral, Nightly  gemfibrozil, 600 mg, oral, BID  [START ON 2/23/2024] glipiZIDE XL, 20 mg, oral, Daily  [START ON 2/23/2024] insulin glargine, 36 Units, subcutaneous, Daily  melatonin, 3 mg, oral, Daily  [START ON 2/23/2024] pantoprazole, 40 mg, oral, Daily before breakfast   Or  [START ON  2/23/2024] pantoprazole, 40 mg, intravenous, Daily before breakfast  sennosides, 2 tablet, oral, BID  [START ON 2/23/2024] SITagliptin phosphate, 100 mg, oral, Daily  [START ON 2/23/2024] valsartan 160 mg, hydroCHLOROthiazide 12.5 mg for Diovan HCT, , oral, Daily      Continuous medications     PRN medications  PRN medications: acetaminophen **OR** acetaminophen **OR** acetaminophen, dextrose 10 % in water (D10W), dextrose, glucagon, ondansetron ODT **OR** ondansetron    Results for orders placed or performed during the hospital encounter of 02/22/24 (from the past 24 hour(s))   ECG 12 lead   Result Value Ref Range    Ventricular Rate 63 BPM    Atrial Rate 63 BPM    ME Interval 190 ms    QRS Duration 104 ms    QT Interval 412 ms    QTC Calculation(Bazett) 421 ms    P Axis 74 degrees    R Axis 60 degrees    T Axis 63 degrees    QRS Count 10 beats    Q Onset 209 ms    P Onset 114 ms    P Offset 168 ms    T Offset 415 ms    QTC Fredericia 418 ms   CBC with Differential   Result Value Ref Range    WBC 5.8 4.4 - 11.3 x10*3/uL    nRBC 0.0 0.0 - 0.0 /100 WBCs    RBC 4.35 (L) 4.50 - 5.90 x10*6/uL    Hemoglobin 13.5 13.5 - 17.5 g/dL    Hematocrit 39.9 (L) 41.0 - 52.0 %    MCV 92 80 - 100 fL    MCH 31.0 26.0 - 34.0 pg    MCHC 33.8 32.0 - 36.0 g/dL    RDW 12.2 11.5 - 14.5 %    Platelets 170 150 - 450 x10*3/uL    Neutrophils % 66.5 40.0 - 80.0 %    Immature Granulocytes %, Automated 1.2 (H) 0.0 - 0.9 %    Lymphocytes % 19.7 13.0 - 44.0 %    Monocytes % 9.0 2.0 - 10.0 %    Eosinophils % 3.3 0.0 - 6.0 %    Basophils % 0.3 0.0 - 2.0 %    Neutrophils Absolute 3.85 1.60 - 5.50 x10*3/uL    Immature Granulocytes Absolute, Automated 0.07 0.00 - 0.50 x10*3/uL    Lymphocytes Absolute 1.14 0.80 - 3.00 x10*3/uL    Monocytes Absolute 0.52 0.05 - 0.80 x10*3/uL    Eosinophils Absolute 0.19 0.00 - 0.40 x10*3/uL    Basophils Absolute 0.02 0.00 - 0.10 x10*3/uL   Comprehensive Metabolic Panel   Result Value Ref Range    Glucose 123 (H) 74 - 99  mg/dL    Sodium 138 136 - 145 mmol/L    Potassium 3.9 3.5 - 5.3 mmol/L    Chloride 103 98 - 107 mmol/L    Bicarbonate 27 21 - 32 mmol/L    Anion Gap 12 10 - 20 mmol/L    Urea Nitrogen 29 (H) 6 - 23 mg/dL    Creatinine 1.43 (H) 0.50 - 1.30 mg/dL    eGFR 52 (L) >60 mL/min/1.73m*2    Calcium 9.0 8.6 - 10.3 mg/dL    Albumin 4.2 3.4 - 5.0 g/dL    Alkaline Phosphatase 51 33 - 136 U/L    Total Protein 7.1 6.4 - 8.2 g/dL    AST 14 9 - 39 U/L    Bilirubin, Total 0.5 0.0 - 1.2 mg/dL    ALT 14 10 - 52 U/L   Troponin I, High Sensitivity, Initial   Result Value Ref Range    Troponin I, High Sensitivity 4 0 - 20 ng/L   POCT GLUCOSE   Result Value Ref Range    POCT Glucose 148 (H) 74 - 99 mg/dL   Troponin, High Sensitivity, 1 Hour   Result Value Ref Range    Troponin I, High Sensitivity 4 0 - 20 ng/L     CT head wo IV contrast   Final Result   1. No acute intracranial abnormality.        2. Mild burden of supratentorial chronic small vessel ischemic   disease.             MACRO:   None.        Signed by: Efraín Melvin 2/22/2024 6:47 PM   Dictation workstation:   [id number]      MR brain wo IV contrast    (Results Pending)          Assessment/Plan   Principal Problem:    Dizziness  Active Problems:    Vertigo    Acute on chronic renal failure (CMS/HCC)      Plan: Neurology consult requested  MRI without contrast pending  Home medications ordered including his glargine insulin  Albuterol as needed,  Lovenox prophylactically  Zofran as needed  Orders, findings, plan etc. discussed with Dr. Jayy James PA-C

## 2024-02-24 ENCOUNTER — APPOINTMENT (OUTPATIENT)
Dept: RADIOLOGY | Facility: HOSPITAL | Age: 74
End: 2024-02-24
Payer: MEDICARE

## 2024-02-24 LAB
ANION GAP SERPL CALC-SCNC: 14 MMOL/L (ref 10–20)
ATRIAL RATE: 63 BPM
BUN SERPL-MCNC: 28 MG/DL (ref 6–23)
CALCIUM SERPL-MCNC: 8.7 MG/DL (ref 8.6–10.3)
CHLORIDE SERPL-SCNC: 105 MMOL/L (ref 98–107)
CO2 SERPL-SCNC: 24 MMOL/L (ref 21–32)
CREAT SERPL-MCNC: 1.34 MG/DL (ref 0.5–1.3)
EGFRCR SERPLBLD CKD-EPI 2021: 57 ML/MIN/1.73M*2
ERYTHROCYTE [DISTWIDTH] IN BLOOD BY AUTOMATED COUNT: 12.2 % (ref 11.5–14.5)
GLUCOSE BLD MANUAL STRIP-MCNC: 149 MG/DL (ref 74–99)
GLUCOSE BLD MANUAL STRIP-MCNC: 165 MG/DL (ref 74–99)
GLUCOSE BLD MANUAL STRIP-MCNC: 175 MG/DL (ref 74–99)
GLUCOSE BLD MANUAL STRIP-MCNC: 249 MG/DL (ref 74–99)
GLUCOSE SERPL-MCNC: 200 MG/DL (ref 74–99)
HCT VFR BLD AUTO: 39.3 % (ref 41–52)
HGB BLD-MCNC: 13.2 G/DL (ref 13.5–17.5)
MCH RBC QN AUTO: 31 PG (ref 26–34)
MCHC RBC AUTO-ENTMCNC: 33.6 G/DL (ref 32–36)
MCV RBC AUTO: 92 FL (ref 80–100)
NRBC BLD-RTO: 0 /100 WBCS (ref 0–0)
P AXIS: 74 DEGREES
P OFFSET: 168 MS
P ONSET: 114 MS
PLATELET # BLD AUTO: 177 X10*3/UL (ref 150–450)
POTASSIUM SERPL-SCNC: 4.3 MMOL/L (ref 3.5–5.3)
PR INTERVAL: 190 MS
Q ONSET: 209 MS
QRS COUNT: 10 BEATS
QRS DURATION: 104 MS
QT INTERVAL: 412 MS
QTC CALCULATION(BAZETT): 421 MS
QTC FREDERICIA: 418 MS
R AXIS: 60 DEGREES
RBC # BLD AUTO: 4.26 X10*6/UL (ref 4.5–5.9)
SODIUM SERPL-SCNC: 139 MMOL/L (ref 136–145)
T AXIS: 63 DEGREES
T OFFSET: 415 MS
VENTRICULAR RATE: 63 BPM
WBC # BLD AUTO: 6.3 X10*3/UL (ref 4.4–11.3)

## 2024-02-24 PROCEDURE — 2500000002 HC RX 250 W HCPCS SELF ADMINISTERED DRUGS (ALT 637 FOR MEDICARE OP, ALT 636 FOR OP/ED): Mod: MUE | Performed by: PHYSICIAN ASSISTANT

## 2024-02-24 PROCEDURE — 2500000001 HC RX 250 WO HCPCS SELF ADMINISTERED DRUGS (ALT 637 FOR MEDICARE OP): Performed by: PHYSICIAN ASSISTANT

## 2024-02-24 PROCEDURE — 80048 BASIC METABOLIC PNL TOTAL CA: CPT | Performed by: PHYSICIAN ASSISTANT

## 2024-02-24 PROCEDURE — 93880 EXTRACRANIAL BILAT STUDY: CPT

## 2024-02-24 PROCEDURE — 2500000004 HC RX 250 GENERAL PHARMACY W/ HCPCS (ALT 636 FOR OP/ED): Performed by: PHYSICIAN ASSISTANT

## 2024-02-24 PROCEDURE — 2500000002 HC RX 250 W HCPCS SELF ADMINISTERED DRUGS (ALT 637 FOR MEDICARE OP, ALT 636 FOR OP/ED): Performed by: NURSE PRACTITIONER

## 2024-02-24 PROCEDURE — 93880 EXTRACRANIAL BILAT STUDY: CPT | Performed by: RADIOLOGY

## 2024-02-24 PROCEDURE — 82947 ASSAY GLUCOSE BLOOD QUANT: CPT | Mod: 59

## 2024-02-24 PROCEDURE — G0378 HOSPITAL OBSERVATION PER HR: HCPCS

## 2024-02-24 PROCEDURE — 85027 COMPLETE CBC AUTOMATED: CPT | Performed by: PHYSICIAN ASSISTANT

## 2024-02-24 PROCEDURE — 36415 COLL VENOUS BLD VENIPUNCTURE: CPT | Performed by: PHYSICIAN ASSISTANT

## 2024-02-24 RX ORDER — BUTALBITAL, ACETAMINOPHEN AND CAFFEINE 50; 325; 40 MG/1; MG/1; MG/1
1 TABLET ORAL EVERY 6 HOURS PRN
Status: DISCONTINUED | OUTPATIENT
Start: 2024-02-24 | End: 2024-02-25 | Stop reason: HOSPADM

## 2024-02-24 RX ORDER — MECLIZINE HYDROCHLORIDE 25 MG/1
25 TABLET ORAL 2 TIMES DAILY
Status: DISCONTINUED | OUTPATIENT
Start: 2024-02-24 | End: 2024-02-25 | Stop reason: HOSPADM

## 2024-02-24 RX ADMIN — ASPIRIN 81 MG: 81 TABLET, COATED ORAL at 08:22

## 2024-02-24 RX ADMIN — TAMSULOSIN HYDROCHLORIDE 0.4 MG: 0.4 CAPSULE ORAL at 20:49

## 2024-02-24 RX ADMIN — ENOXAPARIN SODIUM 40 MG: 40 INJECTION SUBCUTANEOUS at 22:14

## 2024-02-24 RX ADMIN — ATENOLOL 50 MG: 50 TABLET ORAL at 08:23

## 2024-02-24 RX ADMIN — PANTOPRAZOLE SODIUM 40 MG: 40 TABLET, DELAYED RELEASE ORAL at 06:19

## 2024-02-24 RX ADMIN — VALSARTAN: 160 TABLET, FILM COATED ORAL at 08:22

## 2024-02-24 RX ADMIN — GEMFIBROZIL 600 MG: 600 TABLET ORAL at 08:22

## 2024-02-24 RX ADMIN — FAMOTIDINE 20 MG: 20 TABLET, FILM COATED ORAL at 20:49

## 2024-02-24 RX ADMIN — INSULIN LISPRO 2 UNITS: 100 INJECTION, SOLUTION INTRAVENOUS; SUBCUTANEOUS at 17:48

## 2024-02-24 RX ADMIN — GEMFIBROZIL 600 MG: 600 TABLET ORAL at 20:49

## 2024-02-24 RX ADMIN — GLIPIZIDE 20 MG: 2.5 TABLET, EXTENDED RELEASE ORAL at 08:23

## 2024-02-24 RX ADMIN — Medication 3 MG: at 20:49

## 2024-02-24 RX ADMIN — BUTALBITAL, ACETAMINOPHEN, AND CAFFEINE 1 TABLET: 325; 50; 40 TABLET ORAL at 10:35

## 2024-02-24 RX ADMIN — AMLODIPINE BESYLATE 10 MG: 10 TABLET ORAL at 08:23

## 2024-02-24 RX ADMIN — SITAGLIPTIN 100 MG: 100 TABLET, FILM COATED ORAL at 08:23

## 2024-02-24 RX ADMIN — MECLIZINE HYDROCHLORIDE 25 MG: 25 TABLET ORAL at 20:49

## 2024-02-24 RX ADMIN — TAMSULOSIN HYDROCHLORIDE 0.4 MG: 0.4 CAPSULE ORAL at 08:23

## 2024-02-24 RX ADMIN — INSULIN LISPRO 2 UNITS: 100 INJECTION, SOLUTION INTRAVENOUS; SUBCUTANEOUS at 12:22

## 2024-02-24 RX ADMIN — INSULIN GLARGINE 36 UNITS: 100 INJECTION, SOLUTION SUBCUTANEOUS at 08:23

## 2024-02-24 ASSESSMENT — COGNITIVE AND FUNCTIONAL STATUS - GENERAL
DAILY ACTIVITIY SCORE: 24
DAILY ACTIVITIY SCORE: 24
MOBILITY SCORE: 24
MOBILITY SCORE: 24

## 2024-02-24 ASSESSMENT — PAIN SCALES - GENERAL
PAINLEVEL_OUTOF10: 3
PAINLEVEL_OUTOF10: 0 - NO PAIN
PAINLEVEL_OUTOF10: 0 - NO PAIN

## 2024-02-24 ASSESSMENT — PAIN - FUNCTIONAL ASSESSMENT: PAIN_FUNCTIONAL_ASSESSMENT: 0-10

## 2024-02-24 NOTE — CARE PLAN
The patient's goals for the shift include      Problem: Daily Care  Goal: Daily care needs are met  Outcome: Progressing    Problem: Discharge Barriers  Goal: My discharge needs are met  Outcome: Progressing        The clinical goals for the shift include     Problem: Fall/Injury  Goal: Be free from injury by end of the shift  Outcome: Progressing     Problem: Pain  Goal: My pain/discomfort is manageable  Outcome: Progressing     Problem: Safety  Goal: I will remain free of falls  Outcome: Progressing

## 2024-02-24 NOTE — SIGNIFICANT EVENT
MRI unremarkable for new stroke. Follow up outpatient neurology - non urgent basis. I can follow in my clinic. [phone].

## 2024-02-25 VITALS
TEMPERATURE: 97.9 F | DIASTOLIC BLOOD PRESSURE: 63 MMHG | SYSTOLIC BLOOD PRESSURE: 101 MMHG | HEART RATE: 66 BPM | WEIGHT: 200 LBS | BODY MASS INDEX: 27.09 KG/M2 | OXYGEN SATURATION: 97 % | HEIGHT: 72 IN | RESPIRATION RATE: 17 BRPM

## 2024-02-25 PROBLEM — M25.562 KNEE PAIN, LEFT: Status: RESOLVED | Noted: 2023-06-15 | Resolved: 2024-02-25

## 2024-02-25 PROBLEM — M65.312 TRIGGER FINGER OF LEFT THUMB: Status: RESOLVED | Noted: 2023-06-15 | Resolved: 2024-02-25

## 2024-02-25 PROBLEM — M65.30 TRIGGER FINGER: Status: RESOLVED | Noted: 2023-06-15 | Resolved: 2024-02-25

## 2024-02-25 PROBLEM — H25.10 NUCLEAR SCLEROSIS: Status: RESOLVED | Noted: 2023-06-15 | Resolved: 2024-02-25

## 2024-02-25 PROBLEM — E78.5 HYPERLIPIDEMIA: Chronic | Status: ACTIVE | Noted: 2023-06-15

## 2024-02-25 PROBLEM — U07.1 COVID-19: Status: RESOLVED | Noted: 2023-06-15 | Resolved: 2024-02-25

## 2024-02-25 PROBLEM — R06.02 SHORTNESS OF BREATH: Status: RESOLVED | Noted: 2023-06-15 | Resolved: 2024-02-25

## 2024-02-25 PROBLEM — E11.22 TYPE 2 DIABETES MELLITUS WITH STAGE 3A CHRONIC KIDNEY DISEASE, WITH LONG-TERM CURRENT USE OF INSULIN (MULTI): Chronic | Status: ACTIVE | Noted: 2023-06-15

## 2024-02-25 PROBLEM — M25.539 WRIST PAIN: Status: RESOLVED | Noted: 2023-06-15 | Resolved: 2024-02-25

## 2024-02-25 PROBLEM — M54.50 LOW BACK PAIN: Status: RESOLVED | Noted: 2023-06-15 | Resolved: 2024-02-25

## 2024-02-25 PROBLEM — S90.869A TICK BITE OF FOOT: Status: RESOLVED | Noted: 2023-06-15 | Resolved: 2024-02-25

## 2024-02-25 PROBLEM — M65.341 ACQUIRED TRIGGER FINGER OF RIGHT RING FINGER: Status: RESOLVED | Noted: 2023-06-15 | Resolved: 2024-02-25

## 2024-02-25 PROBLEM — N18.31 TYPE 2 DIABETES MELLITUS WITH STAGE 3A CHRONIC KIDNEY DISEASE, WITH LONG-TERM CURRENT USE OF INSULIN (MULTI): Status: ACTIVE | Noted: 2023-06-15

## 2024-02-25 PROBLEM — H43.819 VITREOUS DEGENERATION: Status: RESOLVED | Noted: 2023-06-15 | Resolved: 2024-02-25

## 2024-02-25 PROBLEM — H52.03 HYPEROPIA OF BOTH EYES: Status: RESOLVED | Noted: 2023-06-15 | Resolved: 2024-02-25

## 2024-02-25 PROBLEM — M25.641: Status: RESOLVED | Noted: 2023-06-15 | Resolved: 2024-02-25

## 2024-02-25 PROBLEM — H43.812 POSTERIOR VITREOUS DETACHMENT OF LEFT EYE: Status: ACTIVE | Noted: 2019-02-19

## 2024-02-25 PROBLEM — M19.90 ARTHRITIS: Status: RESOLVED | Noted: 2023-06-15 | Resolved: 2024-02-25

## 2024-02-25 PROBLEM — N18.31 STAGE 3A CHRONIC KIDNEY DISEASE (MULTI): Chronic | Status: ACTIVE | Noted: 2023-06-15

## 2024-02-25 PROBLEM — K21.9 GASTROESOPHAGEAL REFLUX DISEASE WITHOUT ESOPHAGITIS: Chronic | Status: ACTIVE | Noted: 2023-06-15

## 2024-02-25 PROBLEM — M65.30 TRIGGER FINGER, ACQUIRED: Status: RESOLVED | Noted: 2023-06-15 | Resolved: 2024-02-25

## 2024-02-25 PROBLEM — F43.21 GRIEF: Status: RESOLVED | Noted: 2023-06-15 | Resolved: 2024-02-25

## 2024-02-25 PROBLEM — N40.0 BENIGN PROSTATIC HYPERPLASIA WITHOUT LOWER URINARY TRACT SYMPTOMS: Chronic | Status: ACTIVE | Noted: 2023-06-15

## 2024-02-25 PROBLEM — N18.31 TYPE 2 DIABETES MELLITUS WITH STAGE 3A CHRONIC KIDNEY DISEASE, WITH LONG-TERM CURRENT USE OF INSULIN (MULTI): Chronic | Status: ACTIVE | Noted: 2023-06-15

## 2024-02-25 PROBLEM — R49.0 HOARSE VOICE QUALITY: Status: RESOLVED | Noted: 2023-06-15 | Resolved: 2024-02-25

## 2024-02-25 PROBLEM — R51.9 HEADACHE: Status: RESOLVED | Noted: 2023-06-15 | Resolved: 2024-02-25

## 2024-02-25 PROBLEM — Z79.4 TYPE 2 DIABETES MELLITUS WITH STAGE 3A CHRONIC KIDNEY DISEASE, WITH LONG-TERM CURRENT USE OF INSULIN (MULTI): Status: ACTIVE | Noted: 2023-06-15

## 2024-02-25 PROBLEM — R42 DIZZINESS: Status: ACTIVE | Noted: 2023-06-15

## 2024-02-25 PROBLEM — H52.223 REGULAR ASTIGMATISM OF BOTH EYES WITH PRESBYOPIA: Status: RESOLVED | Noted: 2023-06-15 | Resolved: 2024-02-25
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
## 2024-02-25 PROBLEM — Z79.4 TYPE 2 DIABETES MELLITUS WITH STAGE 3A CHRONIC KIDNEY DISEASE, WITH LONG-TERM CURRENT USE OF INSULIN (MULTI): Chronic | Status: ACTIVE | Noted: 2023-06-15

## 2024-02-25 PROBLEM — E78.2 MIXED HYPERLIPIDEMIA: Chronic | Status: ACTIVE | Noted: 2023-06-15

## 2024-02-25 PROBLEM — E11.22 TYPE 2 DIABETES MELLITUS WITH STAGE 3A CHRONIC KIDNEY DISEASE, WITH LONG-TERM CURRENT USE OF INSULIN (MULTI): Status: ACTIVE | Noted: 2023-06-15

## 2024-02-25 PROBLEM — M25.561 RIGHT KNEE PAIN: Status: RESOLVED | Noted: 2023-06-15 | Resolved: 2024-02-25

## 2024-02-25 PROBLEM — Z00.00 WELLNESS EXAMINATION: Status: RESOLVED | Noted: 2023-06-20 | Resolved: 2024-02-25

## 2024-02-25 PROBLEM — M19.049 ARTHRITIS, DEGENERATIVE, LOCALIZED, PRIMARY, HAND: Status: RESOLVED | Noted: 2023-06-15 | Resolved: 2024-02-25

## 2024-02-25 PROBLEM — I10 HYPERTENSION: Chronic | Status: ACTIVE | Noted: 2023-06-15

## 2024-02-25 PROBLEM — S83.232A COMPLEX TEAR OF MEDIAL MENISCUS OF LEFT KNEE AS CURRENT INJURY: Status: RESOLVED | Noted: 2023-06-15 | Resolved: 2024-02-25

## 2024-02-25 PROBLEM — M72.9 MUSCULOSKELETAL FIBROMATOSIS: Status: ACTIVE | Noted: 2023-06-15

## 2024-02-25 PROBLEM — M79.646 THUMB PAIN: Status: RESOLVED | Noted: 2023-06-15 | Resolved: 2024-02-25

## 2024-02-25 PROBLEM — H52.4 REGULAR ASTIGMATISM OF BOTH EYES WITH PRESBYOPIA: Status: RESOLVED | Noted: 2023-06-15 | Resolved: 2024-02-25

## 2024-02-25 PROBLEM — H04.123 DRY EYE SYNDROME, BILATERAL: Status: RESOLVED | Noted: 2023-06-15 | Resolved: 2024-02-25

## 2024-02-25 PROBLEM — R93.1 ELEVATED CORONARY ARTERY CALCIUM SCORE: Status: RESOLVED | Noted: 2023-06-15 | Resolved: 2024-02-25

## 2024-02-25 PROBLEM — H57.12 PAIN AROUND LEFT EYE: Status: RESOLVED | Noted: 2023-06-15 | Resolved: 2024-02-25

## 2024-02-25 PROBLEM — D22.4 MELANOCYTIC NEVI OF SCALP AND NECK: Status: ACTIVE | Noted: 2021-12-13

## 2024-02-25 PROBLEM — H81.11 BENIGN PAROXYSMAL POSITIONAL VERTIGO OF RIGHT EAR: Status: ACTIVE | Noted: 2023-06-15

## 2024-02-25 PROBLEM — M79.644 PAIN IN RIGHT FINGER(S): Status: RESOLVED | Noted: 2023-06-15 | Resolved: 2024-02-25

## 2024-02-25 PROBLEM — R42 DIZZINESS: Status: RESOLVED | Noted: 2024-02-22 | Resolved: 2024-02-25

## 2024-02-25 PROBLEM — E78.2 MIXED HYPERLIPIDEMIA: Status: ACTIVE | Noted: 2023-06-15

## 2024-02-25 LAB
ANION GAP SERPL CALC-SCNC: 13 MMOL/L (ref 10–20)
BUN SERPL-MCNC: 36 MG/DL (ref 6–23)
CALCIUM SERPL-MCNC: 8.8 MG/DL (ref 8.6–10.3)
CHLORIDE SERPL-SCNC: 105 MMOL/L (ref 98–107)
CO2 SERPL-SCNC: 24 MMOL/L (ref 21–32)
CREAT SERPL-MCNC: 1.55 MG/DL (ref 0.5–1.3)
EGFRCR SERPLBLD CKD-EPI 2021: 48 ML/MIN/1.73M*2
ERYTHROCYTE [DISTWIDTH] IN BLOOD BY AUTOMATED COUNT: 12.4 % (ref 11.5–14.5)
GLUCOSE BLD MANUAL STRIP-MCNC: 82 MG/DL (ref 74–99)
GLUCOSE BLD MANUAL STRIP-MCNC: 94 MG/DL (ref 74–99)
GLUCOSE SERPL-MCNC: 83 MG/DL (ref 74–99)
HCT VFR BLD AUTO: 37 % (ref 41–52)
HGB BLD-MCNC: 12.2 G/DL (ref 13.5–17.5)
MCH RBC QN AUTO: 30.8 PG (ref 26–34)
MCHC RBC AUTO-ENTMCNC: 33 G/DL (ref 32–36)
MCV RBC AUTO: 93 FL (ref 80–100)
NRBC BLD-RTO: 0 /100 WBCS (ref 0–0)
PLATELET # BLD AUTO: 170 X10*3/UL (ref 150–450)
POTASSIUM SERPL-SCNC: 3.4 MMOL/L (ref 3.5–5.3)
RBC # BLD AUTO: 3.96 X10*6/UL (ref 4.5–5.9)
SODIUM SERPL-SCNC: 139 MMOL/L (ref 136–145)
WBC # BLD AUTO: 8.4 X10*3/UL (ref 4.4–11.3)

## 2024-02-25 PROCEDURE — 2500000002 HC RX 250 W HCPCS SELF ADMINISTERED DRUGS (ALT 637 FOR MEDICARE OP, ALT 636 FOR OP/ED): Performed by: NURSE PRACTITIONER

## 2024-02-25 PROCEDURE — 80048 BASIC METABOLIC PNL TOTAL CA: CPT | Performed by: PHYSICIAN ASSISTANT

## 2024-02-25 PROCEDURE — 99232 SBSQ HOSP IP/OBS MODERATE 35: CPT | Performed by: NURSE PRACTITIONER

## 2024-02-25 PROCEDURE — 85027 COMPLETE CBC AUTOMATED: CPT | Performed by: PHYSICIAN ASSISTANT

## 2024-02-25 PROCEDURE — 2500000001 HC RX 250 WO HCPCS SELF ADMINISTERED DRUGS (ALT 637 FOR MEDICARE OP): Performed by: NURSE PRACTITIONER

## 2024-02-25 PROCEDURE — 82947 ASSAY GLUCOSE BLOOD QUANT: CPT | Mod: 59

## 2024-02-25 PROCEDURE — 36415 COLL VENOUS BLD VENIPUNCTURE: CPT | Performed by: PHYSICIAN ASSISTANT

## 2024-02-25 PROCEDURE — 2500000002 HC RX 250 W HCPCS SELF ADMINISTERED DRUGS (ALT 637 FOR MEDICARE OP, ALT 636 FOR OP/ED): Performed by: PHYSICIAN ASSISTANT

## 2024-02-25 PROCEDURE — 2500000001 HC RX 250 WO HCPCS SELF ADMINISTERED DRUGS (ALT 637 FOR MEDICARE OP): Performed by: PHYSICIAN ASSISTANT

## 2024-02-25 PROCEDURE — G0378 HOSPITAL OBSERVATION PER HR: HCPCS

## 2024-02-25 RX ORDER — LOPERAMIDE HYDROCHLORIDE 2 MG/1
2 CAPSULE ORAL 4 TIMES DAILY PRN
Status: DISCONTINUED | OUTPATIENT
Start: 2024-02-25 | End: 2024-02-25 | Stop reason: HOSPADM

## 2024-02-25 RX ORDER — POTASSIUM CHLORIDE 20 MEQ/1
20 TABLET, EXTENDED RELEASE ORAL ONCE
Status: COMPLETED | OUTPATIENT
Start: 2024-02-25 | End: 2024-02-25

## 2024-02-25 RX ORDER — ACETAMINOPHEN 325 MG/1
650 TABLET ORAL EVERY 4 HOURS PRN
Status: DISCONTINUED | OUTPATIENT
Start: 2024-02-25 | End: 2024-02-25 | Stop reason: HOSPADM

## 2024-02-25 RX ORDER — INSULIN GLARGINE 300 [IU]/ML
36 INJECTION, SOLUTION SUBCUTANEOUS EVERY MORNING
COMMUNITY
Start: 2024-02-25 | End: 2024-02-25 | Stop reason: SDUPTHER

## 2024-02-25 RX ORDER — MECLIZINE HYDROCHLORIDE 25 MG/1
25 TABLET ORAL 2 TIMES DAILY
Qty: 60 TABLET | Refills: 0 | Status: SHIPPED | OUTPATIENT
Start: 2024-02-25 | End: 2024-03-26

## 2024-02-25 RX ORDER — INSULIN GLARGINE 300 [IU]/ML
30 INJECTION, SOLUTION SUBCUTANEOUS EVERY MORNING
Start: 2024-02-25 | End: 2024-04-30

## 2024-02-25 RX ADMIN — ATENOLOL 50 MG: 50 TABLET ORAL at 09:07

## 2024-02-25 RX ADMIN — AMLODIPINE BESYLATE 10 MG: 10 TABLET ORAL at 09:07

## 2024-02-25 RX ADMIN — INSULIN GLARGINE 36 UNITS: 100 INJECTION, SOLUTION SUBCUTANEOUS at 09:48

## 2024-02-25 RX ADMIN — PANTOPRAZOLE SODIUM 40 MG: 40 TABLET, DELAYED RELEASE ORAL at 06:17

## 2024-02-25 RX ADMIN — MECLIZINE HYDROCHLORIDE 25 MG: 25 TABLET ORAL at 09:07

## 2024-02-25 RX ADMIN — LOPERAMIDE HYDROCHLORIDE 2 MG: 2 CAPSULE ORAL at 13:21

## 2024-02-25 RX ADMIN — BUTALBITAL, ACETAMINOPHEN, AND CAFFEINE 1 TABLET: 325; 50; 40 TABLET ORAL at 09:07

## 2024-02-25 RX ADMIN — ASPIRIN 81 MG: 81 TABLET, COATED ORAL at 09:07

## 2024-02-25 RX ADMIN — GLIPIZIDE 20 MG: 2.5 TABLET, EXTENDED RELEASE ORAL at 09:07

## 2024-02-25 RX ADMIN — TAMSULOSIN HYDROCHLORIDE 0.4 MG: 0.4 CAPSULE ORAL at 09:07

## 2024-02-25 RX ADMIN — POTASSIUM CHLORIDE 20 MEQ: 1500 TABLET, EXTENDED RELEASE ORAL at 09:07

## 2024-02-25 RX ADMIN — GEMFIBROZIL 600 MG: 600 TABLET ORAL at 09:07

## 2024-02-25 RX ADMIN — SITAGLIPTIN 100 MG: 100 TABLET, FILM COATED ORAL at 09:07

## 2024-02-25 ASSESSMENT — ACTIVITIES OF DAILY LIVING (ADL): LACK_OF_TRANSPORTATION: NO

## 2024-02-25 NOTE — CARE PLAN
The patient's goals for the shift include      Problem: Discharge Barriers  Goal: My discharge needs are met  Outcome: Progressing     Problem: Psychosocial Needs  Goal: Demonstrates ability to cope with hospitalization/illness  Outcome: Progressing     Problem: Daily Care  Goal: Daily care needs are met  Outcome: Progressing       The clinical goals for the shift include     Problem: Pain  Goal: My pain/discomfort is manageable  Outcome: Progressing     Problem: Fall/Injury  Goal: Not fall by end of shift  Outcome: Progressing

## 2024-02-25 NOTE — DISCHARGE SUMMARY
"Discharge Diagnosis  Benign paroxysmal positional vertigo of right ear    Issues Requiring Follow-Up  Follow up with Neurology in 2-3 weeks  Follow up with PCP in 1 week     Discharge Meds     Your medication list        START taking these medications        Instructions Last Dose Given Next Dose Due   meclizine 25 mg tablet  Commonly known as: Antivert      Take 1 tablet (25 mg) by mouth 2 times a day.              CHANGE how you take these medications        Instructions Last Dose Given Next Dose Due   insulin glargine 300 unit/mL (1.5 mL) injection  Commonly known as: Toujeo SoloStar U-300 Insulin  What changed:   how much to take  additional instructions      Inject 30 Units under the skin once daily in the morning. Please note change in dose -36 units daily              CONTINUE taking these medications        Instructions Last Dose Given Next Dose Due   Accu-Chek Kamala Plus test strp strip  Generic drug: blood sugar diagnostic           albuterol 90 mcg/actuation inhaler      Inhale 2 puffs every 6 hours if needed for shortness of breath.       allopurinol 300 mg tablet  Commonly known as: Zyloprim      Take 1 tablet (300 mg) by mouth once daily.       amLODIPine 10 mg tablet  Commonly known as: Norvasc      Take 1 tablet (10 mg) by mouth once daily.       aspirin 81 mg EC tablet      Take 1 tablet (81 mg) by mouth once daily.       atenolol 50 mg tablet  Commonly known as: Tenormin      Take 1 tablet (50 mg) by mouth once daily.       BD Ultra-Fine Short Pen Needle 31 gauge x 5/16\" needle  Generic drug: pen needle, diabetic      USE AS DIRECTED       biotin 10 mg tablet           cranberry extract 300 mg tablet           famotidine 20 mg tablet  Commonly known as: Pepcid      Take 1 tablet (20 mg) by mouth once daily at bedtime.       gemfibrozil 600 mg tablet  Commonly known as: Lopid      Take 1 tablet (600 mg) by mouth 2 times a day.       glipiZIDE XL 10 mg 24 hr tablet  Commonly known as: Glucotrol XL   "    Take 2 tablets (20 mg) by mouth once daily.       ibuprofen 200 mg tablet           Januvia 100 mg tablet  Generic drug: SITagliptin phosphate      Take 1 tablet (100 mg) by mouth once daily.       ketoconazole 2 % cream  Commonly known as: NIZOral      Apply topically 2 times a day. To affected areas of ears       omeprazole 40 mg DR capsule  Commonly known as: PriLOSEC      Take 1 capsule (40 mg) by mouth once daily in the morning. Take before meals.       tamsulosin 0.4 mg 24 hr capsule  Commonly known as: Flomax      Take 1 capsule (0.4 mg) by mouth 2 times a day.       valsartan-hydrochlorothiazide 160-12.5 mg tablet  Commonly known as: Diovan-HCT      Take 1 tablet by mouth once daily.                 Where to Get Your Medications        These medications were sent to [facility]/pharmacy #[id number] - [facility] [address] [place] [address]  [address], [facility] [address]      Phone: [phone]   meclizine 25 mg tablet       Information about where to get these medications is not yet available    Ask your nurse or doctor about these medications  insulin glargine 300 unit/mL (1.5 mL) injection         Test Results Pending At Discharge  Pending Labs       No current pending labs.            Hospital Course  Edward D Hume is a 73 y.o. male presenting with vertigo, lightheadedness, acute on chronic renal failure.  Patient has a history of type 2 diabetes, hypertension, gout, hyperlipidemia etc.  He states he has had intermittent room spinning vertigo that started 5 days ago..  He has underlying constant lightheadedness.  Patient states he feels like he is drunk but he has not been drinking.  Patient states the dizziness and vertigo vary in intensity.  He has had mild intermittent nausea but no vomiting.  He has a mild headache today.  He has chronic intermittent diarrhea and frequent stools but that is again chronic for him.  He does complain of intermittent slight burning sensation to the left medial  distal lower leg.  He does not have it currently.  No other symptoms..     In the emergency department he had a CAT scan of his head that showed no acute process but he did have chronic small vessel ischemic disease.  The ED attending was concerned because the patient appeared to have a wide gait and could not walk heel-to-toe.  He wanted him admitted for neuro consult and the patient was admitted observation status.  Labs revealed a metabolic panel that was within normal limits other than a glucose of 123 BUN of 29, creatinine of 1.43, and GFR 52..  The patient's BUN was 25 and the creatinine was 1.37 in September 2023.  Patient's troponins were 4 and 4.  CBC showed a normal white count and no left shift.  Hematocrit was 39.9, hemoglobin 13.5, platelets were within normal limits.     Past medical history: Type 2 diabetes, hypertension, osteoarthritis, GERD, gout, hyperlipidemia, asthma, polio as a child, chronic intermittent diarrhea with 4-5 stools a day typically.    Peripheral vertigo  CTH, MRI/MRA negative for any acute intracranial findings  MRI neck - ? Included portions of the common carotid arteries are patent. There is   irregularity and attenuation of flow related signal at the   bifurcations and origins of the ICAs, right greater than left likely   due to a combination of artifact and atherosclerotic plaque. There is   a focal outpouching arising from the dorsal aspect of the proximal   right ICA measuring approximately 4 mm. There is no measurable   stenosis.   Carotid US pending results on discharge, will follow up with neurology  Symptoms have been improving  Was seen by neurology. Performed Epley maneuver   S/p decadron and Meclizine. Will discharge home on Meclizine    Patient seen at bedside. Events from the last visit reviewed. Discussed with staff. Results of tests and investigations from last visit reviewed and discussed with patient/Family. Electronic chart reviewed. Input / Recommendations   from consultants appreciated and reviewed and agreed with.    discharge summary and profile completed. medications reviewed and discussed with patient and family.  scripts completed and signed.    total discharge time in excess of 30 minutes.    Pertinent Physical Exam At Time of Discharge  Physical Exam    Constitutional: NAD  Eyes: no icterus   ENMT: mucous membranes moist  Head/Neck: supple  Resp/thorax: CTA bilat, no cough, on RA  C/V: RRR, no murmurs  : no Cooper  GI: S/ND/NT, + BS  M/S: no joint swelling  Extremities: no edema  Neurological: non-focal  Skin: Warm and dry      Outpatient Follow-Up  Future Appointments   Date Time Provider Department Center   2/28/2024 10:50 AM Mehul Dominguez MD [id number] [facility]   5/28/2024  9:00 AM Jerrod Anthony MD [id number] [facility]   6/3/2024  8:20 AM Roselia Ibrahim MD [id number] [facility]   6/10/2024  8:00 AM Vineet Bello MD [id number] [facility]         Dianne Miller, APRN-CNP

## 2024-02-25 NOTE — NURSING NOTE
Upon grabbing from pt med bin in [facility], this RN noticed insulin glargine dose for this AM that was stocked was incorrect. Syringe contained 16u of lantus and not ordered 36u. Called pharmacy to let them know and sent incorrect dosage back to pharmacy. Awaiting new dose.

## 2024-02-25 NOTE — PROGRESS NOTES
02/25/24 0652   [facility] Disability Status   Are you deaf or do you have serious difficulty hearing? N   Are you blind or do you have serious difficulty seeing, even when wearing glasses? N   Because of a physical, mental, or emotional condition, do you have serious difficulty concentrating, remembering, or making decisions? (5 years old or older) N   Do you have serious difficulty walking or climbing stairs? N   Do you have serious difficulty dressing or bathing? N   Because of a physical, mental, or emotional condition, do you have serious difficulty doing errands alone such as visiting the doctor? N

## 2024-02-25 NOTE — PROGRESS NOTES
Transitional Care Coordination Progress Note:  Plan per Medical/Surgical team: treatment of dizziness with antivert, neuro consult (no CVA, follow as outpatient)  Status: Observation DAY 3  Payor source: medicare A/B  Discharge disposition: Home with wife  Potential Barriers: renal 36/1.55  ADOD: 2/25/2024  ROSIE Wiley RN, BSN Transitional Care Coordinator ED# [phone]      02/25/24 0652   Discharge Planning   Living Arrangements Spouse/significant other   Support Systems Spouse/significant other   Assistance Needed neuro plan- follow up as outpatient   Type of Residence Private residence   Number of Stairs to Enter Residence 0   Number of Stairs Within Residence 0   Home or Post Acute Services None   Patient expects to be discharged to: Home with wife   Does the patient need discharge transport arranged? Yes   RoundTrip coordination needed? Yes   Has discharge transport been arranged? No   Financial Resource Strain   How hard is it for you to pay for the very basics like food, housing, medical care, and heating? Not hard   Housing Stability   In the last 12 months, was there a time when you were not able to pay the mortgage or rent on time? N   In the last 12 months, how many places have you lived? 1   In the last 12 months, was there a time when you did not have a steady place to sleep or slept in a shelter (including now)? N   Transportation Needs   In the past 12 months, has lack of transportation kept you from medical appointments or from getting medications? no   In the past 12 months, has lack of transportation kept you from meetings, work, or from getting things needed for daily living? No

## 2024-02-25 NOTE — CARE PLAN
The patient's goals for the shift include      The clinical goals for the shift include pt will remain hds      Problem: Fall/Injury  Goal: Not fall by end of shift  Outcome: Progressing  Goal: Be free from injury by end of the shift  Outcome: Progressing  Goal: Verbalize understanding of personal risk factors for fall in the hospital  Outcome: Progressing  Goal: Verbalize understanding of risk factor reduction measures to prevent injury from fall in the home  Outcome: Progressing  Goal: Use assistive devices by end of the shift  Outcome: Progressing  Goal: Pace activities to prevent fatigue by end of the shift  Outcome: Progressing     Problem: Pain  Goal: My pain/discomfort is manageable  Outcome: Progressing     Problem: Safety  Goal: Patient will be injury free during hospitalization  Outcome: Progressing  Goal: I will remain free of falls  Outcome: Progressing     Problem: Daily Care  Goal: Daily care needs are met  Outcome: Progressing     Problem: Psychosocial Needs  Goal: Demonstrates ability to cope with hospitalization/illness  Outcome: Progressing  Goal: Collaborate with me, my family, and caregiver to identify my specific goals  Outcome: Progressing     Problem: Discharge Barriers  Goal: My discharge needs are met  Outcome: Progressing

## 2024-02-25 NOTE — PROGRESS NOTES
Home with wife     02/25/24 0652   Current Planned Discharge Disposition   Current Planned Discharge Disposition Home

## 2024-02-25 NOTE — NURSING NOTE
Discharge instructions reviewed with pt, all questions answered. Pt given new prescription for meclizine (antivert) to be taken as needed for dizziness. Pt instructed to follow up with PCP as well as with neurologist Dr Talbert who saw him while he was here. IV access removed prior to discharge, catheter intact. Pt discharging home, stable and ambulatory at time of discharge.

## 2024-02-28 ENCOUNTER — APPOINTMENT (OUTPATIENT)
Dept: ORTHOPEDIC SURGERY | Facility: HOSPITAL | Age: 74
End: 2024-02-28
Payer: MEDICARE

## 2024-03-03 DIAGNOSIS — I10 HYPERTENSION, UNSPECIFIED TYPE: ICD-10-CM

## 2024-03-04 DIAGNOSIS — N18.31 TYPE 2 DIABETES MELLITUS WITH STAGE 3A CHRONIC KIDNEY DISEASE, WITH LONG-TERM CURRENT USE OF INSULIN (MULTI): Primary | Chronic | ICD-10-CM

## 2024-03-04 DIAGNOSIS — Z87.09 HISTORY OF ASTHMA: ICD-10-CM

## 2024-03-04 DIAGNOSIS — E11.22 TYPE 2 DIABETES MELLITUS WITH STAGE 3A CHRONIC KIDNEY DISEASE, WITH LONG-TERM CURRENT USE OF INSULIN (MULTI): Primary | Chronic | ICD-10-CM

## 2024-03-04 DIAGNOSIS — Z79.4 TYPE 2 DIABETES MELLITUS WITH STAGE 3A CHRONIC KIDNEY DISEASE, WITH LONG-TERM CURRENT USE OF INSULIN (MULTI): Primary | Chronic | ICD-10-CM

## 2024-03-04 RX ORDER — ATENOLOL 50 MG/1
50 TABLET ORAL DAILY
Qty: 30 TABLET | Refills: 3 | Status: SHIPPED | OUTPATIENT
Start: 2024-03-04 | End: 2024-06-03 | Stop reason: SDUPTHER

## 2024-03-29 ENCOUNTER — CLINICAL SUPPORT (OUTPATIENT)
Dept: PHYSICAL THERAPY | Facility: CLINIC | Age: 74
End: 2024-03-29
Payer: MEDICARE

## 2024-03-29 DIAGNOSIS — H81.391 PERIPHERAL VERTIGO INVOLVING RIGHT EAR: ICD-10-CM

## 2024-03-29 DIAGNOSIS — H81.11 BENIGN PAROXYSMAL POSITIONAL VERTIGO OF RIGHT EAR: Primary | ICD-10-CM

## 2024-03-29 PROCEDURE — 97162 PT EVAL MOD COMPLEX 30 MIN: CPT | Mod: GP | Performed by: PHYSICAL THERAPIST

## 2024-03-29 NOTE — PROGRESS NOTES
Vestibular Physical Therapy Initial Examination Note    Patient Name: Edward D Hume  MRN Number: [id number]  Initial Examination Date: 3/29/24  Referring Clinician: GALINA Sun  Reason for Visit: Dizziness    Time Calculation  Start Time: 0952  Stop Time: 1045  Time Calculation (min): 53 min    Insurance  Visit Number: 1   Approved Visits: Medical necessity, no authorization  Certification Period: 3/29/24 - 5/24/24  Coverage: Payor: MEDICARE / Plan: MEDICARE PART A AND B / Product Type: *No Product type* /     Precautions/ History  HTN; mixed HLD; vitamin D deficiency.    Problem List  1. Benign paroxysmal positional vertigo of right ear        2. Peripheral vertigo involving right ear  Referral to Physical Therapy        Subjectie  More than 10 years ago he would get dizzy at work, would get up and about 3-5 minutes later he got dizziness, felt like he was going to black out, like he was shutting off. Nothing ever done to fix the dizziness. Started getting room spinning feeling a month before he was seen in the ER on 2/25/24. Ultrasound of his neck showed some occlusion in the neck, not enough to explain his dizziness. Summer or 2023 he tripped in the yard hit his face hard, chipped teeth. Was eventual diagnosed with BPPV, he was kept for a week. Still taking meclizine twice a day. Still having some dizziness, only when laying down. Denies neck pain.     Inspection  Gait: Antalgic (right knee pain/ issues)  Walking with horizontal head movements: no dizziness  Walking vertical head movements: no dizziness    Objective  Other Measures  Other Outcome Measures: DHI = 12    Cervical AROM:  Left  Right  Comments  Rotation  Restriction Restricted  Side Flexion  Restricted Restricted    Postural Stability Testing  Zachary SOP  condition 1: Normal  condition 2: Normal  condition 3: Normal  condition 4: Sway  condition 5: Normal  condition 6: Fall  condition 7: Normal (slight turn left at end of  test)     Occulomotor Testing  Saccades Vertical: Normal  Saccades Horizontal: Normal  Gaze Center: Normal  Gaze Right: Normal  Gaze Left: Normal  Gaze Upward: Normal  Optokinetic Reflex: Normal    Vestibulo-Ocular Reflex Testing  Head Thrust Test/ Head Impulse Test: positive    Provoked Positional Vertigo Testing   Left  Right  Posterior Canal:      (-)  (+) (no nystagmus visualized)  Horizontal Canal:    (-)  (-)    Assessment  Patient presents to physical therapy with complaints of chronic dizziness, no glaring deficits found.  Will continue testing to discern vestibular hypofunction, BPPV or cervicogenic component.  He has been regularly taking meclizine and this may have affected results on testing this date.    Problem List  -Functional limitations/difficulties  -Dizziness  -Positional dizziness  -Postural sway    Plan  Planned interventions include: Therapeutic exercise , Manual therapy, Neuromuscular re-education, Patient education, Home exercise program, Soft tissue mobilization, and Balance exercises    1 Visits/ Week for 8 Weeks    Home Program  To be provided on next session if appropriate    Goals  - Full return to prior level of function prior to recent onset of dizziness to allow continued home independent capability.  - Full resolution of dizziness to improve quality of life.  - Full resolution of postural dizziness to improve ability to turn in bed.  - Patient to have no falls and negative test on condition 7 within ANGELA SOP testing to improve postural control and balance.    Plan of care developed in agreement with patient.

## 2024-04-04 ENCOUNTER — TREATMENT (OUTPATIENT)
Dept: PHYSICAL THERAPY | Facility: CLINIC | Age: 74
End: 2024-04-04
Payer: MEDICARE

## 2024-04-04 DIAGNOSIS — H81.11 BENIGN PAROXYSMAL POSITIONAL VERTIGO OF RIGHT EAR: Primary | ICD-10-CM

## 2024-04-04 PROCEDURE — 97112 NEUROMUSCULAR REEDUCATION: CPT | Mod: 59,GP | Performed by: PHYSICAL THERAPIST

## 2024-04-04 PROCEDURE — 95992 CANALITH REPOSITIONING PROC: CPT | Mod: GP | Performed by: PHYSICAL THERAPIST

## 2024-04-04 NOTE — PROGRESS NOTES
Vestibular Physical Therapy Daily Treatment Note    Patient Name: Edward D Hume  MRN Number: [id number]  Initial Examination Date: 3/29/24  Referring Clinician: GALINA Sun  Reason for Visit: Dizziness    Time Calculation  Start Time: 0155  Stop Time: 0239  Time Calculation (min): 44 min    Insurance  Visit Number: 1   Approved Visits: Medical necessity, no authorization  Certification Period: 3/29/24 - 5/24/24  Coverage: Payor: MEDICARE / Plan: MEDICARE PART A AND B / Product Type: *No Product type* /     Precautions/ History  HTN; mixed HLD; vitamin D deficiency.    Problem List  1. Benign paroxysmal positional vertigo of right ear          Subjective  No changes in dizziness since seen last week. Stopped taking meclizine yesterday.    Treatment  Canalith Repositioning  Left  Right  Posterior Canal:    (-)  (-)  Horizontal Canal:  (-)  (-)    *dizziness when rolling to right side when sitting up from left Hallpike, not reproducible on another attempt    Neuromuscular Reeducation   - gaze stabilization with head rotation 2x1 min   - gaze stabilization with head nods 2x1 min    Therapeutic Exercise   - seated upper trapezius stretch, grasping table 5x20 sec each side    Assessment  No improvements in dizziness since last seen in PT, BPPV seems less likely but still cannot rule out based on response this session.     Plan  Planned interventions include: Therapeutic exercise , Manual therapy, Neuromuscular re-education, Patient education, Home exercise program, Soft tissue mobilization, and Balance exercises    1 Visits/ Week for 8 Weeks    Home Program  Access Code: [email or web address]  URL: [email or web address]  Date: 04/09/2024  Prepared by: Ozzie Camacho    Exercises  - Standing Gaze Stabilization with Head Rotation  - 1-2 x daily - 7 x weekly - 1-2 minute  - Standing Gaze Stabilization with Head Rotation  - 1-2 x daily - 7 x weekly - 1-2 minute  - Seated Upper Trapezius Stretch  - 7 x  weekly - 5 reps - 20 sec hold    Goals  - Full return to prior level of function prior to recent onset of dizziness to allow continued home independent capability.  - Full resolution of dizziness to improve quality of life.  - Full resolution of postural dizziness to improve ability to turn in bed.  - Patient to have no falls and negative test on condition 7 within ANGELA SOP testing to improve postural control and balance.    Plan of care developed in agreement with patient.

## 2024-04-11 ENCOUNTER — APPOINTMENT (OUTPATIENT)
Dept: PHYSICAL THERAPY | Facility: CLINIC | Age: 74
End: 2024-04-11
Payer: MEDICARE

## 2024-04-17 ENCOUNTER — TREATMENT (OUTPATIENT)
Dept: PHYSICAL THERAPY | Facility: CLINIC | Age: 74
End: 2024-04-17
Payer: MEDICARE

## 2024-04-17 DIAGNOSIS — H81.391 PERIPHERAL VERTIGO INVOLVING RIGHT EAR: ICD-10-CM

## 2024-04-17 DIAGNOSIS — H81.11 BENIGN PAROXYSMAL POSITIONAL VERTIGO OF RIGHT EAR: ICD-10-CM

## 2024-04-17 PROCEDURE — 97110 THERAPEUTIC EXERCISES: CPT | Mod: GP | Performed by: PHYSICAL THERAPIST

## 2024-04-17 NOTE — PROGRESS NOTES
Vestibular Physical Therapy Daily Treatment Note    Patient Name: Edward D Hume  MRN Number: [id number]  Initial Examination Date: 3/29/24  Referring Clinician: GALINA Sun  Reason for Visit: Dizziness    Time Calculation  Start Time: 748  Stop Time: 820  Time Calculation (min): 32 min    Insurance  Visit Number: 3   Approved Visits: Medical necessity, no authorization  Certification Period: 3/29/24 - 24  Coverage: Payor: MEDICARE / Plan: MEDICARE PART A AND B / Product Type: *No Product type* /     Precautions/ History  HTN; mixed HLD; vitamin D deficiency.    Problem List  1. Peripheral vertigo involving right ear  Follow Up In Physical Therapy      2. Benign paroxysmal positional vertigo of right ear  Follow Up In Physical Therapy        Subjective  Dizziness is bad. This past week he was sick, had diarrhea. He has had some shortness of breath, happens around the same time of year, spring. Quit smoking 30 years ago. Got some chest pain last week. Saw the Cardiologist 6 months ago. Feeling heaviness in his chest. Mom, grandmother and grandfather all  of hear attacks. Has two types of dizziness, at times he gets up fast hits him 3-5 minutes later (maybe a minute he is not exactly). Dizziness laying down on the couch has gone away. He is not sure if the exercises are helping.     Treatment  Therapeutic Exercise   - upper trapezius stretch 5x20 sec each side    Vitals/ Monitoring condition  - 130/65 mmHg  - 70 BPM  - sent message to Dr. Jerrod Anthony about Eds current complaints and presentation  - also messaged patients referring practitioner (Truman Cortes) and primary care ( Dr. Roselia Ibrahim) to update on his situation  - patient felt he was safe to leave despite education from PT    Assessment  Told patient he needs to go to the emergency room. Possible that dizziness symptoms are related to cardiac concern. Instructed to contact PT prior to his next session, will not resume  physical therapy until cardiac testing/ examination performed.     Plan  Planned interventions include: Therapeutic exercise , Manual therapy, Neuromuscular re-education, Patient education, Home exercise program, Soft tissue mobilization, and Balance exercises    1 Visits/ Week for 8 Weeks    Home Program  Access Code: [email or web address]  URL: [email or web address]  Date: 04/09/2024  Prepared by: Ozzie Camacho    Exercises  - Standing Gaze Stabilization with Head Rotation  - 1-2 x daily - 7 x weekly - 1-2 minute  - Standing Gaze Stabilization with Head Rotation  - 1-2 x daily - 7 x weekly - 1-2 minute  - Seated Upper Trapezius Stretch  - 7 x weekly - 5 reps - 20 sec hold    Goals  - Full return to prior level of function prior to recent onset of dizziness to allow continued home independent capability.  - Full resolution of dizziness to improve quality of life.  - Full resolution of postural dizziness to improve ability to turn in bed.  - Patient to have no falls and negative test on condition 7 within ANGELA SOP testing to improve postural control and balance.    Plan of care developed in agreement with patient.

## 2024-04-24 ENCOUNTER — APPOINTMENT (OUTPATIENT)
Dept: PHYSICAL THERAPY | Facility: CLINIC | Age: 74
End: 2024-04-24
Payer: MEDICARE

## 2024-04-25 ENCOUNTER — OFFICE VISIT (OUTPATIENT)
Dept: NEUROLOGY | Facility: CLINIC | Age: 74
End: 2024-04-25
Payer: MEDICARE

## 2024-04-25 VITALS
TEMPERATURE: 97.1 F | WEIGHT: 216 LBS | SYSTOLIC BLOOD PRESSURE: 121 MMHG | BODY MASS INDEX: 30.92 KG/M2 | DIASTOLIC BLOOD PRESSURE: 62 MMHG | HEART RATE: 69 BPM | HEIGHT: 70 IN

## 2024-04-25 DIAGNOSIS — H49.21 SIXTH NERVE PALSY, RIGHT: ICD-10-CM

## 2024-04-25 DIAGNOSIS — H81.399 PERIPHERAL VERTIGO, UNSPECIFIED LATERALITY: Primary | ICD-10-CM

## 2024-04-25 PROCEDURE — 3078F DIAST BP <80 MM HG: CPT | Performed by: PSYCHIATRY & NEUROLOGY

## 2024-04-25 PROCEDURE — 3044F HG A1C LEVEL LT 7.0%: CPT | Performed by: PSYCHIATRY & NEUROLOGY

## 2024-04-25 PROCEDURE — 99213 OFFICE O/P EST LOW 20 MIN: CPT | Performed by: PSYCHIATRY & NEUROLOGY

## 2024-04-25 PROCEDURE — 3048F LDL-C <100 MG/DL: CPT | Performed by: PSYCHIATRY & NEUROLOGY

## 2024-04-25 PROCEDURE — 3074F SYST BP LT 130 MM HG: CPT | Performed by: PSYCHIATRY & NEUROLOGY

## 2024-04-25 PROCEDURE — 1159F MED LIST DOCD IN RCRD: CPT | Performed by: PSYCHIATRY & NEUROLOGY

## 2024-04-25 PROCEDURE — 1036F TOBACCO NON-USER: CPT | Performed by: PSYCHIATRY & NEUROLOGY

## 2024-04-25 RX ORDER — DEXTROSE 50 % IN WATER (D50W) INTRAVENOUS SYRINGE
25
Status: ON HOLD | COMMUNITY
Start: 2024-02-22 | End: 2024-05-03 | Stop reason: ENTERED-IN-ERROR

## 2024-04-25 NOTE — PROGRESS NOTES
NEUROLOGY OUTPATIENT FOLLOW-UP NOTE    Assessment/Plan   Diagnoses and all orders for this visit:  Peripheral vertigo, unspecified laterality  Sixth nerve palsy, right      IMPRESSION:  Resolved vertigo.  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    PLAN:  No acute neurological intervention required at this time.  I am happy to see him again as needed or as requested.      Alexei Talbert Jr., M.D., FAAN   ----------    Subjective     Edward D Hume is a 73 y.o. year old male here for hospital follow-up.    I saw him in February for peripheral vertigo.  Cranial MRI done at the time revealed no acute pathology.  The vertigo resolved after hospitalization.    He can stand and if he moves too fast, without 1-3 minutes, his legs would collapse.  Last episode was two weeks ago, and last prior episode was ten years ago.     He denies other focal neurological symptoms including dysarthria, dysphagia, diplopia, focal weakness, focal sensory change, ataxia, vertigo, or bowel/bladder incontinence, among others.      Past Medical History:   Diagnosis Date    Acute nonparalytic poliomyelitis ([facility])     Acute nonparalytic polio    Body mass index (BMI) 25.0-25.9, adult 10/26/2019    Body mass index (BMI) of 25.0 to 25.9 in adult    Body mass index (BMI) 27.0-27.9, adult 09/22/2021    Body mass index (BMI) of 27.0 to 27.9 in adult    Body mass index (BMI) 29.0-29.9, adult 08/31/2019    Body mass index (BMI) of 29.0 to 29.9 in adult    Crushing injury of unspecified finger(s), initial encounter 06/12/2014    Injury, crush, finger    Disorder of the skin and subcutaneous tissue, unspecified 09/04/2019    Skin lesion    Disorder of the skin and subcutaneous tissue, unspecified 03/30/2016    Skin lesion    Elevated prostate specific antigen (PSA)     Elevated prostate specific antigen (PSA)    History of falling 06/27/2016    History of fall    Other chest pain 03/07/2018    Chest pressure    Other conditions influencing health  status 02/22/2017    Type 2 diabetes mellitus, uncontrolled    Other conditions influencing health status 03/04/2016    Epicondylitis    Other conditions influencing health status     Nephrolithiasis    Personal history of other mental and behavioral disorders 11/06/2018    History of depression    Personal history of other specified conditions 04/16/2019    History of dizziness    Personal history of other specified conditions 04/16/2019    History of weight loss    Personal history of other specified conditions 06/29/2016    History of abdominal pain    Puncture wound without foreign body of left hand, initial encounter 03/05/2019    Puncture wound of hand, left    Puncture wound without foreign body of unspecified hand, initial encounter 03/05/2019    Puncture wound, hand    Superficial mycosis, unspecified 12/01/2014    Fungal otitis externa    Trigger finger, left middle finger 07/24/2017    Trigger middle finger of left hand    Trigger finger, left middle finger 07/24/2017    Trigger middle finger of left hand    Trigger finger, left ring finger 07/24/2017    Trigger ring finger of left hand    Trigger finger, right index finger 07/24/2017    Trigger index finger of right hand    Trigger finger, right middle finger 11/10/2017    Trigger middle finger of right hand     Past Surgical History:   Procedure Laterality Date    HAND TENDON SURGERY  02/15/2013    Hand Incision Tendon Sheath Of A Finger    OTHER SURGICAL HISTORY  04/09/2013    Needle Biopsy Of Prostate    OTHER SURGICAL HISTORY  07/02/2020    Retinal detachment repair    OTHER SURGICAL HISTORY  12/06/2017    Upper Gastrointestinal Endoscopy, Simple Primary Exam    OTHER SURGICAL HISTORY  06/12/2013    Repair Of Retinal Detachment Procedure Detail    OTHER SURGICAL HISTORY  06/12/2013    Surg Rad Resect Tonsil/Tonsil Pillars/Retromol Tri W/O Closure    SHOULDER SURGERY  06/12/2013    Shoulder Surgery     Social History     Tobacco Use    Smoking status:  Former     Types: Cigarettes    Smokeless tobacco: Never   Substance Use Topics    Alcohol use: Yes     Comment: Occasionally     family history is not on file.    Current Outpatient Medications:     dextrose 50 % injection, Infuse 50 mL (25 g) into a venous catheter., Disp: , Rfl:     albuterol 90 mcg/actuation inhaler, Inhale 2 puffs every 6 hours if needed for shortness of breath., Disp: 18 g, Rfl: 2    allopurinol (Zyloprim) 300 mg tablet, Take 1 tablet (300 mg) by mouth once daily., Disp: 30 tablet, Rfl: 4    amLODIPine (Norvasc) 10 mg tablet, Take 1 tablet (10 mg) by mouth once daily., Disp: 30 tablet, Rfl: 4    aspirin 81 mg EC tablet, Take 1 tablet (81 mg) by mouth once daily., Disp: 30 tablet, Rfl: 4    atenolol (Tenormin) 50 mg tablet, TAKE 1 TABLET BY MOUTH EVERY DAY, Disp: 30 tablet, Rfl: 3    biotin 10 mg tablet, Take 1 tablet (10 mg) by mouth once daily., Disp: , Rfl:     blood sugar diagnostic (Accu-Chek Kamala Plus test strp) strip, 1 strip 2 times a day., Disp: , Rfl:     cranberry fruit extract (cranberry extract) 300 mg tablet, Take 650 mg by mouth once daily., Disp: , Rfl:     famotidine (Pepcid) 20 mg tablet, Take 1 tablet (20 mg) by mouth once daily at bedtime., Disp: 30 tablet, Rfl: 4    gemfibrozil (Lopid) 600 mg tablet, Take 1 tablet (600 mg) by mouth 2 times a day., Disp: 30 tablet, Rfl: 4    glipiZIDE XL (Glucotrol XL) 10 mg 24 hr tablet, Take 2 tablets (20 mg) by mouth once daily., Disp: 60 tablet, Rfl: 2    ibuprofen 200 mg tablet, Take 1 tablet (200 mg) by mouth every 6 hours if needed., Disp: , Rfl:     insulin glargine (Toujeo SoloStar U-300 Insulin) 300 unit/mL (1.5 mL) injection, Inject 30 Units under the skin once daily in the morning. Please note change in dose -36 units daily, Disp: , Rfl:     Januvia 100 mg tablet, Take 1 tablet (100 mg) by mouth once daily., Disp: 30 tablet, Rfl: 3    ketoconazole (NIZOral) 2 % cream, Apply topically 2 times a day. To affected areas of ears,  "Disp: 60 g, Rfl: 11    omeprazole (PriLOSEC) 40 mg DR capsule, Take 1 capsule (40 mg) by mouth once daily in the morning. Take before meals., Disp: 30 capsule, Rfl: 4    pen needle, diabetic (BD Ultra-Fine Short Pen Needle) 31 gauge x 5/16\" needle, USE AS DIRECTED, Disp: 100 each, Rfl: 3    tamsulosin (Flomax) 0.4 mg 24 hr capsule, Take 1 capsule (0.4 mg) by mouth 2 times a day., Disp: 180 capsule, Rfl: 0    valsartan-hydrochlorothiazide (Diovan-HCT) 160-12.5 mg tablet, Take 1 tablet by mouth once daily., Disp: 30 tablet, Rfl: 4  Allergies   Allergen Reactions    Amoxicillin Shortness of breath    Metformin Swelling and Angioedema    Iodinated Contrast Media Palpitations       Objective     /62   Pulse 69   Temp 36.2 °C (97.1 °F)   Ht 1.778 m (5' 10\")   Wt 98 kg (216 lb)   BMI 30.99 kg/m²     CONSTITUTIONAL:  No acute distress    MENTAL STATUS:  Awake, alert, fully oriented to self, place, and time, with present short-term memory, good awareness of recent events, normal attention span, concentration, and fund of knowledge.    SPEECH AND LANGUAGE:  Can name and repeat, follows all commands, has no dysarthria    CRANIAL NERVES:  II-Vision present, visual fields full to confrontational testing    III/IV/VI--EOMs are present in all directions other than the right eye does not complete abduct.  Pupils are symmetrically reactive in dim light.  No ptosis.    V--Normal facial sensation.    VII--No facial asymmetry.    VIII--Hearing present to voice bilaterally.    IX/X--Symmetric soft palate rise.    XI--Normal trapezius power bilaterally.    XII--Tongue protrudes without deviation.    MOTOR:  Normal power, tone, and bulk in both arms and both legs.    SENSORY:  Normal pin sensation in both arms and both legs without distal-proximal gradient, asymmetry, or spinal sensory level.    COORDINATION:  Normal finger-to-nose and heel-to-shin testing in both arms and both legs.    REFLEXES are normal and symmetric at the " biceps, triceps, brachioradialis, patella, and ankle.  The plantar responses are flexor.    GAIT is normal, without steppage, ataxia, shuffling, or spasticity.      Alexei Talbert Jr., M.D., [facility]

## 2024-04-30 ENCOUNTER — OFFICE VISIT (OUTPATIENT)
Dept: CARDIOLOGY | Facility: HOSPITAL | Age: 74
DRG: 234 | End: 2024-04-30
Payer: MEDICARE

## 2024-04-30 ENCOUNTER — LAB (OUTPATIENT)
Dept: LAB | Facility: LAB | Age: 74
End: 2024-04-30
Payer: MEDICARE

## 2024-04-30 VITALS
HEIGHT: 70 IN | DIASTOLIC BLOOD PRESSURE: 75 MMHG | BODY MASS INDEX: 30.35 KG/M2 | HEART RATE: 76 BPM | SYSTOLIC BLOOD PRESSURE: 168 MMHG | OXYGEN SATURATION: 96 % | WEIGHT: 212 LBS

## 2024-04-30 DIAGNOSIS — R93.1 ELEVATED CORONARY ARTERY CALCIUM SCORE: ICD-10-CM

## 2024-04-30 DIAGNOSIS — I10 HYPERTENSION, UNSPECIFIED TYPE: ICD-10-CM

## 2024-04-30 DIAGNOSIS — R07.9 CHEST PAIN, UNSPECIFIED TYPE: ICD-10-CM

## 2024-04-30 DIAGNOSIS — Z79.4 TYPE 2 DIABETES MELLITUS WITHOUT COMPLICATION, WITH LONG-TERM CURRENT USE OF INSULIN (MULTI): ICD-10-CM

## 2024-04-30 DIAGNOSIS — I10 PRIMARY HYPERTENSION: Chronic | ICD-10-CM

## 2024-04-30 DIAGNOSIS — E78.2 MIXED HYPERLIPIDEMIA: Primary | Chronic | ICD-10-CM

## 2024-04-30 DIAGNOSIS — E11.9 TYPE 2 DIABETES MELLITUS WITHOUT COMPLICATION, WITH LONG-TERM CURRENT USE OF INSULIN (MULTI): ICD-10-CM

## 2024-04-30 DIAGNOSIS — Z91.041 CONTRAST MEDIA ALLERGY: ICD-10-CM

## 2024-04-30 LAB
ANION GAP SERPL CALC-SCNC: 14 MMOL/L (ref 10–20)
ATRIAL RATE: 68 BPM
BASOPHILS # BLD AUTO: 0.04 X10*3/UL (ref 0–0.1)
BASOPHILS NFR BLD AUTO: 0.7 %
BUN SERPL-MCNC: 29 MG/DL (ref 6–23)
CALCIUM SERPL-MCNC: 8.6 MG/DL (ref 8.6–10.3)
CHLORIDE SERPL-SCNC: 104 MMOL/L (ref 98–107)
CO2 SERPL-SCNC: 26 MMOL/L (ref 21–32)
CREAT SERPL-MCNC: 1.44 MG/DL (ref 0.5–1.3)
EGFRCR SERPLBLD CKD-EPI 2021: 52 ML/MIN/1.73M*2
EOSINOPHIL # BLD AUTO: 0.16 X10*3/UL (ref 0–0.4)
EOSINOPHIL NFR BLD AUTO: 2.9 %
ERYTHROCYTE [DISTWIDTH] IN BLOOD BY AUTOMATED COUNT: 12.6 % (ref 11.5–14.5)
GLUCOSE SERPL-MCNC: 178 MG/DL (ref 74–99)
HCT VFR BLD AUTO: 38.8 % (ref 41–52)
HGB BLD-MCNC: 13.3 G/DL (ref 13.5–17.5)
IMM GRANULOCYTES # BLD AUTO: 0.08 X10*3/UL (ref 0–0.5)
IMM GRANULOCYTES NFR BLD AUTO: 1.4 % (ref 0–0.9)
INR PPP: 1.1 (ref 0.9–1.1)
LYMPHOCYTES # BLD AUTO: 1.11 X10*3/UL (ref 0.8–3)
LYMPHOCYTES NFR BLD AUTO: 20.1 %
MCH RBC QN AUTO: 31.1 PG (ref 26–34)
MCHC RBC AUTO-ENTMCNC: 34.3 G/DL (ref 32–36)
MCV RBC AUTO: 91 FL (ref 80–100)
MONOCYTES # BLD AUTO: 0.58 X10*3/UL (ref 0.05–0.8)
MONOCYTES NFR BLD AUTO: 10.5 %
NEUTROPHILS # BLD AUTO: 3.55 X10*3/UL (ref 1.6–5.5)
NEUTROPHILS NFR BLD AUTO: 64.4 %
NRBC BLD-RTO: 0 /100 WBCS (ref 0–0)
P AXIS: 62 DEGREES
P OFFSET: 168 MS
P ONSET: 114 MS
PLATELET # BLD AUTO: 185 X10*3/UL (ref 150–450)
POTASSIUM SERPL-SCNC: 4.2 MMOL/L (ref 3.5–5.3)
PR INTERVAL: 188 MS
PROTHROMBIN TIME: 11.9 SECONDS (ref 9.8–12.8)
Q ONSET: 208 MS
QRS COUNT: 11 BEATS
QRS DURATION: 102 MS
QT INTERVAL: 420 MS
QTC CALCULATION(BAZETT): 446 MS
QTC FREDERICIA: 438 MS
R AXIS: 50 DEGREES
RBC # BLD AUTO: 4.27 X10*6/UL (ref 4.5–5.9)
SODIUM SERPL-SCNC: 140 MMOL/L (ref 136–145)
T AXIS: 52 DEGREES
T OFFSET: 418 MS
VENTRICULAR RATE: 68 BPM
WBC # BLD AUTO: 5.5 X10*3/UL (ref 4.4–11.3)

## 2024-04-30 PROCEDURE — 85610 PROTHROMBIN TIME: CPT

## 2024-04-30 PROCEDURE — 85025 COMPLETE CBC W/AUTO DIFF WBC: CPT

## 2024-04-30 PROCEDURE — 99214 OFFICE O/P EST MOD 30 MIN: CPT | Performed by: NURSE PRACTITIONER

## 2024-04-30 PROCEDURE — 1160F RVW MEDS BY RX/DR IN RCRD: CPT | Performed by: NURSE PRACTITIONER

## 2024-04-30 PROCEDURE — 80048 BASIC METABOLIC PNL TOTAL CA: CPT

## 2024-04-30 PROCEDURE — 93010 ELECTROCARDIOGRAM REPORT: CPT | Performed by: INTERNAL MEDICINE

## 2024-04-30 PROCEDURE — 3078F DIAST BP <80 MM HG: CPT | Performed by: NURSE PRACTITIONER

## 2024-04-30 PROCEDURE — 3044F HG A1C LEVEL LT 7.0%: CPT | Performed by: NURSE PRACTITIONER

## 2024-04-30 PROCEDURE — 36415 COLL VENOUS BLD VENIPUNCTURE: CPT

## 2024-04-30 PROCEDURE — 3048F LDL-C <100 MG/DL: CPT | Performed by: NURSE PRACTITIONER

## 2024-04-30 PROCEDURE — 93005 ELECTROCARDIOGRAM TRACING: CPT | Performed by: NURSE PRACTITIONER

## 2024-04-30 PROCEDURE — 3077F SYST BP >= 140 MM HG: CPT | Performed by: NURSE PRACTITIONER

## 2024-04-30 PROCEDURE — 1159F MED LIST DOCD IN RCRD: CPT | Performed by: NURSE PRACTITIONER

## 2024-04-30 RX ORDER — INSULIN GLARGINE 300 U/ML
INJECTION, SOLUTION SUBCUTANEOUS
Qty: 3 ML | Refills: 2 | Status: SHIPPED | OUTPATIENT
Start: 2024-04-30 | End: 2024-06-03 | Stop reason: SDUPTHER

## 2024-04-30 RX ORDER — PREDNISONE 20 MG/1
40 TABLET ORAL ONCE
Qty: 6 TABLET | Refills: 0 | Status: SHIPPED | OUTPATIENT
Start: 2024-04-30 | End: 2024-05-06 | Stop reason: HOSPADM

## 2024-04-30 RX ORDER — GEMFIBROZIL 600 MG/1
600 TABLET, FILM COATED ORAL 2 TIMES DAILY
Qty: 60 TABLET | Refills: 2 | Status: SHIPPED | OUTPATIENT
Start: 2024-04-30 | End: 2024-06-03 | Stop reason: SDUPTHER

## 2024-05-01 ENCOUNTER — APPOINTMENT (OUTPATIENT)
Dept: CARDIOLOGY | Facility: HOSPITAL | Age: 74
DRG: 234 | End: 2024-05-01
Payer: MEDICARE

## 2024-05-01 ENCOUNTER — APPOINTMENT (OUTPATIENT)
Dept: RADIOLOGY | Facility: HOSPITAL | Age: 74
DRG: 234 | End: 2024-05-01
Payer: MEDICARE

## 2024-05-01 ENCOUNTER — APPOINTMENT (OUTPATIENT)
Dept: CARDIOLOGY | Facility: CLINIC | Age: 74
DRG: 234 | End: 2024-05-01
Payer: MEDICARE

## 2024-05-01 ENCOUNTER — HOSPITAL ENCOUNTER (INPATIENT)
Facility: HOSPITAL | Age: 74
LOS: 5 days | Discharge: HOME | DRG: 234 | End: 2024-05-06
Attending: INTERNAL MEDICINE | Admitting: THORACIC SURGERY (CARDIOTHORACIC VASCULAR SURGERY)
Payer: MEDICARE

## 2024-05-01 DIAGNOSIS — Z95.1 S/P CABG X 1: Primary | ICD-10-CM

## 2024-05-01 DIAGNOSIS — I20.0 UNSTABLE ANGINA PECTORIS (MULTI): ICD-10-CM

## 2024-05-01 DIAGNOSIS — Z01.818 ENCOUNTER FOR OTHER PREPROCEDURAL EXAMINATION: ICD-10-CM

## 2024-05-01 DIAGNOSIS — I20.0 UNSTABLE ANGINA (MULTI): ICD-10-CM

## 2024-05-01 LAB
ABO GROUP (TYPE) IN BLOOD: NORMAL
ANTIBODY SCREEN: NORMAL
APPEARANCE UR: CLEAR
BILIRUB UR STRIP.AUTO-MCNC: NEGATIVE MG/DL
COLOR UR: ABNORMAL
GLUCOSE BLD MANUAL STRIP-MCNC: 303 MG/DL (ref 74–99)
GLUCOSE BLD MANUAL STRIP-MCNC: 308 MG/DL (ref 74–99)
GLUCOSE BLD MANUAL STRIP-MCNC: 310 MG/DL (ref 74–99)
GLUCOSE UR STRIP.AUTO-MCNC: ABNORMAL MG/DL
KETONES UR STRIP.AUTO-MCNC: NEGATIVE MG/DL
LEFT ATRIUM VOLUME AREA LENGTH INDEX BSA: 33.7 ML/M2
LEUKOCYTE ESTERASE UR QL STRIP.AUTO: NEGATIVE
NITRITE UR QL STRIP.AUTO: NEGATIVE
PH UR STRIP.AUTO: 6 [PH]
PROT UR STRIP.AUTO-MCNC: ABNORMAL MG/DL
RBC # UR STRIP.AUTO: ABNORMAL /UL
RBC #/AREA URNS AUTO: ABNORMAL /HPF
RH FACTOR (ANTIGEN D): NORMAL
RIGHT VENTRICLE FREE WALL PEAK S': 9.9 CM/S
SP GR UR STRIP.AUTO: 1.03
TRICUSPID ANNULAR PLANE SYSTOLIC EXCURSION: 2.9 CM
UROBILINOGEN UR STRIP.AUTO-MCNC: NORMAL MG/DL
WBC #/AREA URNS AUTO: ABNORMAL /HPF

## 2024-05-01 PROCEDURE — B2151ZZ FLUOROSCOPY OF LEFT HEART USING LOW OSMOLAR CONTRAST: ICD-10-PCS

## 2024-05-01 PROCEDURE — 71045 X-RAY EXAM CHEST 1 VIEW: CPT | Performed by: RADIOLOGY

## 2024-05-01 PROCEDURE — 4A023N7 MEASUREMENT OF CARDIAC SAMPLING AND PRESSURE, LEFT HEART, PERCUTANEOUS APPROACH: ICD-10-PCS

## 2024-05-01 PROCEDURE — 93458 L HRT ARTERY/VENTRICLE ANGIO: CPT | Performed by: INTERNAL MEDICINE

## 2024-05-01 PROCEDURE — 93005 ELECTROCARDIOGRAM TRACING: CPT

## 2024-05-01 PROCEDURE — 93010 ELECTROCARDIOGRAM REPORT: CPT | Performed by: STUDENT IN AN ORGANIZED HEALTH CARE EDUCATION/TRAINING PROGRAM

## 2024-05-01 PROCEDURE — 81003 URINALYSIS AUTO W/O SCOPE: CPT | Performed by: STUDENT IN AN ORGANIZED HEALTH CARE EDUCATION/TRAINING PROGRAM

## 2024-05-01 PROCEDURE — 94010 BREATHING CAPACITY TEST: CPT

## 2024-05-01 PROCEDURE — C1760 CLOSURE DEV, VASC: HCPCS | Performed by: INTERNAL MEDICINE

## 2024-05-01 PROCEDURE — 2500000001 HC RX 250 WO HCPCS SELF ADMINISTERED DRUGS (ALT 637 FOR MEDICARE OP): Performed by: NURSE PRACTITIONER

## 2024-05-01 PROCEDURE — C1894 INTRO/SHEATH, NON-LASER: HCPCS | Performed by: INTERNAL MEDICINE

## 2024-05-01 PROCEDURE — 71250 CT THORAX DX C-: CPT

## 2024-05-01 PROCEDURE — B2111ZZ FLUOROSCOPY OF MULTIPLE CORONARY ARTERIES USING LOW OSMOLAR CONTRAST: ICD-10-PCS

## 2024-05-01 PROCEDURE — 71045 X-RAY EXAM CHEST 1 VIEW: CPT

## 2024-05-01 PROCEDURE — 86920 COMPATIBILITY TEST SPIN: CPT | Mod: 91

## 2024-05-01 PROCEDURE — 87081 CULTURE SCREEN ONLY: CPT | Mod: AHULAB | Performed by: STUDENT IN AN ORGANIZED HEALTH CARE EDUCATION/TRAINING PROGRAM

## 2024-05-01 PROCEDURE — 2720000007 HC OR 272 NO HCPCS: Performed by: INTERNAL MEDICINE

## 2024-05-01 PROCEDURE — 2500000004 HC RX 250 GENERAL PHARMACY W/ HCPCS (ALT 636 FOR OP/ED): Performed by: INTERNAL MEDICINE

## 2024-05-01 PROCEDURE — 99152 MOD SED SAME PHYS/QHP 5/>YRS: CPT | Performed by: INTERNAL MEDICINE

## 2024-05-01 PROCEDURE — 2060000001 HC INTERMEDIATE ICU ROOM DAILY

## 2024-05-01 PROCEDURE — 2500000002 HC RX 250 W HCPCS SELF ADMINISTERED DRUGS (ALT 637 FOR MEDICARE OP, ALT 636 FOR OP/ED): Performed by: NURSE PRACTITIONER

## 2024-05-01 PROCEDURE — 96373 THER/PROPH/DIAG INJ IA: CPT | Performed by: INTERNAL MEDICINE

## 2024-05-01 PROCEDURE — 82947 ASSAY GLUCOSE BLOOD QUANT: CPT | Mod: 91

## 2024-05-01 PROCEDURE — 82947 ASSAY GLUCOSE BLOOD QUANT: CPT

## 2024-05-01 PROCEDURE — 2500000004 HC RX 250 GENERAL PHARMACY W/ HCPCS (ALT 636 FOR OP/ED): Performed by: NURSE PRACTITIONER

## 2024-05-01 PROCEDURE — 36415 COLL VENOUS BLD VENIPUNCTURE: CPT | Performed by: NURSE PRACTITIONER

## 2024-05-01 PROCEDURE — 86901 BLOOD TYPING SEROLOGIC RH(D): CPT | Performed by: NURSE PRACTITIONER

## 2024-05-01 PROCEDURE — 93308 TTE F-UP OR LMTD: CPT | Performed by: INTERNAL MEDICINE

## 2024-05-01 PROCEDURE — 2500000005 HC RX 250 GENERAL PHARMACY W/O HCPCS: Performed by: INTERNAL MEDICINE

## 2024-05-01 PROCEDURE — 2550000001 HC RX 255 CONTRASTS: Performed by: INTERNAL MEDICINE

## 2024-05-01 PROCEDURE — 93308 TTE F-UP OR LMTD: CPT

## 2024-05-01 RX ORDER — ATENOLOL 50 MG/1
50 TABLET ORAL DAILY
Status: DISCONTINUED | OUTPATIENT
Start: 2024-05-02 | End: 2024-05-02

## 2024-05-01 RX ORDER — FENTANYL CITRATE 50 UG/ML
INJECTION, SOLUTION INTRAMUSCULAR; INTRAVENOUS AS NEEDED
Status: DISCONTINUED | OUTPATIENT
Start: 2024-05-01 | End: 2024-05-01 | Stop reason: HOSPADM

## 2024-05-01 RX ORDER — DEXTROSE 50 % IN WATER (D50W) INTRAVENOUS SYRINGE
12.5
Status: DISCONTINUED | OUTPATIENT
Start: 2024-05-01 | End: 2024-05-02

## 2024-05-01 RX ORDER — DIPHENHYDRAMINE HCL 25 MG
50 CAPSULE ORAL ONCE
Status: DISCONTINUED | OUTPATIENT
Start: 2024-05-01 | End: 2024-05-01

## 2024-05-01 RX ORDER — INSULIN LISPRO 100 [IU]/ML
0-5 INJECTION, SOLUTION INTRAVENOUS; SUBCUTANEOUS
Status: DISCONTINUED | OUTPATIENT
Start: 2024-05-01 | End: 2024-05-02

## 2024-05-01 RX ORDER — NITROGLYCERIN 40 MG/100ML
INJECTION INTRAVENOUS AS NEEDED
Status: DISCONTINUED | OUTPATIENT
Start: 2024-05-01 | End: 2024-05-01 | Stop reason: HOSPADM

## 2024-05-01 RX ORDER — METOPROLOL TARTRATE 1 MG/ML
5 INJECTION, SOLUTION INTRAVENOUS ONCE
Status: COMPLETED | OUTPATIENT
Start: 2024-05-01 | End: 2024-05-01

## 2024-05-01 RX ORDER — ACETAMINOPHEN 650 MG/1
650 SUPPOSITORY RECTAL EVERY 6 HOURS PRN
Status: DISCONTINUED | OUTPATIENT
Start: 2024-05-01 | End: 2024-05-02

## 2024-05-01 RX ORDER — LIDOCAINE HYDROCHLORIDE 20 MG/ML
INJECTION, SOLUTION INFILTRATION; PERINEURAL AS NEEDED
Status: DISCONTINUED | OUTPATIENT
Start: 2024-05-01 | End: 2024-05-01 | Stop reason: HOSPADM

## 2024-05-01 RX ORDER — METOPROLOL TARTRATE 1 MG/ML
5 INJECTION, SOLUTION INTRAVENOUS ONCE
Status: DISCONTINUED | OUTPATIENT
Start: 2024-05-01 | End: 2024-05-01

## 2024-05-01 RX ORDER — FAMOTIDINE 10 MG/ML
20 INJECTION INTRAVENOUS ONCE
Status: COMPLETED | OUTPATIENT
Start: 2024-05-01 | End: 2024-05-01

## 2024-05-01 RX ORDER — DEXTROSE 50 % IN WATER (D50W) INTRAVENOUS SYRINGE
25
Status: DISCONTINUED | OUTPATIENT
Start: 2024-05-01 | End: 2024-05-02

## 2024-05-01 RX ORDER — MUPIROCIN 20 MG/G
0.5 OINTMENT TOPICAL 2 TIMES DAILY
Status: DISCONTINUED | OUTPATIENT
Start: 2024-05-01 | End: 2024-05-02

## 2024-05-01 RX ORDER — SODIUM CHLORIDE 9 MG/ML
1.5 INJECTION, SOLUTION INTRAVENOUS CONTINUOUS
Status: ACTIVE | OUTPATIENT
Start: 2024-05-01 | End: 2024-05-01

## 2024-05-01 RX ORDER — DIPHENHYDRAMINE HYDROCHLORIDE 50 MG/ML
50 INJECTION INTRAMUSCULAR; INTRAVENOUS ONCE
Status: COMPLETED | OUTPATIENT
Start: 2024-05-01 | End: 2024-05-01

## 2024-05-01 RX ORDER — FAMOTIDINE 20 MG/1
20 TABLET, FILM COATED ORAL ONCE
Status: DISCONTINUED | OUTPATIENT
Start: 2024-05-01 | End: 2024-05-01

## 2024-05-01 RX ORDER — CHLORHEXIDINE GLUCONATE ORAL RINSE 1.2 MG/ML
15 SOLUTION DENTAL 2 TIMES DAILY
Status: DISCONTINUED | OUTPATIENT
Start: 2024-05-01 | End: 2024-05-02

## 2024-05-01 RX ORDER — ALBUTEROL SULFATE 90 UG/1
2 AEROSOL, METERED RESPIRATORY (INHALATION) EVERY 6 HOURS PRN
Status: DISCONTINUED | OUTPATIENT
Start: 2024-05-01 | End: 2024-05-01

## 2024-05-01 RX ORDER — ASPIRIN 81 MG/1
81 TABLET ORAL DAILY
Status: DISCONTINUED | OUTPATIENT
Start: 2024-05-02 | End: 2024-05-02

## 2024-05-01 RX ORDER — SODIUM CHLORIDE 9 MG/ML
100 INJECTION, SOLUTION INTRAVENOUS CONTINUOUS
Status: DISCONTINUED | OUTPATIENT
Start: 2024-05-01 | End: 2024-05-01

## 2024-05-01 RX ORDER — HEPARIN SODIUM 1000 [USP'U]/ML
INJECTION, SOLUTION INTRAVENOUS; SUBCUTANEOUS AS NEEDED
Status: DISCONTINUED | OUTPATIENT
Start: 2024-05-01 | End: 2024-05-01 | Stop reason: HOSPADM

## 2024-05-01 RX ORDER — PANTOPRAZOLE SODIUM 40 MG/1
40 TABLET, DELAYED RELEASE ORAL
Status: DISCONTINUED | OUTPATIENT
Start: 2024-05-02 | End: 2024-05-02

## 2024-05-01 RX ORDER — MIDAZOLAM HYDROCHLORIDE 1 MG/ML
INJECTION, SOLUTION INTRAMUSCULAR; INTRAVENOUS AS NEEDED
Status: DISCONTINUED | OUTPATIENT
Start: 2024-05-01 | End: 2024-05-01 | Stop reason: HOSPADM

## 2024-05-01 RX ORDER — PANTOPRAZOLE SODIUM 40 MG/10ML
40 INJECTION, POWDER, LYOPHILIZED, FOR SOLUTION INTRAVENOUS
Status: DISCONTINUED | OUTPATIENT
Start: 2024-05-02 | End: 2024-05-02

## 2024-05-01 RX ORDER — ALBUTEROL SULFATE 0.83 MG/ML
2.5 SOLUTION RESPIRATORY (INHALATION) EVERY 4 HOURS PRN
Status: DISCONTINUED | OUTPATIENT
Start: 2024-05-01 | End: 2024-05-02

## 2024-05-01 RX ORDER — ACETAMINOPHEN 325 MG/1
650 TABLET ORAL EVERY 6 HOURS PRN
Status: DISCONTINUED | OUTPATIENT
Start: 2024-05-01 | End: 2024-05-02

## 2024-05-01 RX ORDER — ACETAMINOPHEN 160 MG/5ML
650 SOLUTION ORAL EVERY 6 HOURS PRN
Status: DISCONTINUED | OUTPATIENT
Start: 2024-05-01 | End: 2024-05-02

## 2024-05-01 RX ADMIN — MUPIROCIN 0.5 APPLICATION: 20 OINTMENT TOPICAL at 21:00

## 2024-05-01 RX ADMIN — INSULIN LISPRO 4 UNITS: 100 INJECTION, SOLUTION INTRAVENOUS; SUBCUTANEOUS at 15:46

## 2024-05-01 RX ADMIN — METOPROLOL TARTRATE 5 MG: 5 INJECTION INTRAVENOUS at 14:00

## 2024-05-01 RX ADMIN — INSULIN LISPRO 4 UNITS: 100 INJECTION, SOLUTION INTRAVENOUS; SUBCUTANEOUS at 18:55

## 2024-05-01 RX ADMIN — SODIUM CHLORIDE 100 ML/HR: 9 INJECTION, SOLUTION INTRAVENOUS at 10:15

## 2024-05-01 SDOH — SOCIAL STABILITY: SOCIAL INSECURITY: DO YOU FEEL UNSAFE GOING BACK TO THE PLACE WHERE YOU ARE LIVING?: NO

## 2024-05-01 SDOH — SOCIAL STABILITY: SOCIAL INSECURITY: WERE YOU ABLE TO COMPLETE ALL THE BEHAVIORAL HEALTH SCREENINGS?: YES

## 2024-05-01 SDOH — SOCIAL STABILITY: SOCIAL INSECURITY: DOES ANYONE TRY TO KEEP YOU FROM HAVING/CONTACTING OTHER FRIENDS OR DOING THINGS OUTSIDE YOUR HOME?: NO

## 2024-05-01 SDOH — SOCIAL STABILITY: SOCIAL INSECURITY: DO YOU FEEL ANYONE HAS EXPLOITED OR TAKEN ADVANTAGE OF YOU FINANCIALLY OR OF YOUR PERSONAL PROPERTY?: NO

## 2024-05-01 SDOH — SOCIAL STABILITY: SOCIAL INSECURITY: ARE YOU OR HAVE YOU BEEN THREATENED OR ABUSED PHYSICALLY, EMOTIONALLY, OR SEXUALLY BY ANYONE?: NO

## 2024-05-01 SDOH — SOCIAL STABILITY: SOCIAL INSECURITY: ABUSE: ADULT

## 2024-05-01 SDOH — SOCIAL STABILITY: SOCIAL INSECURITY: ARE THERE ANY APPARENT SIGNS OF INJURIES/BEHAVIORS THAT COULD BE RELATED TO ABUSE/NEGLECT?: NO

## 2024-05-01 SDOH — SOCIAL STABILITY: SOCIAL INSECURITY: HAS ANYONE EVER THREATENED TO HURT YOUR FAMILY OR YOUR PETS?: NO

## 2024-05-01 SDOH — SOCIAL STABILITY: SOCIAL INSECURITY: HAVE YOU HAD THOUGHTS OF HARMING ANYONE ELSE?: NO

## 2024-05-01 SDOH — SOCIAL STABILITY: SOCIAL INSECURITY: HAVE YOU HAD ANY THOUGHTS OF HARMING ANYONE ELSE?: NO

## 2024-05-01 ASSESSMENT — COGNITIVE AND FUNCTIONAL STATUS - GENERAL
DAILY ACTIVITIY SCORE: 24
MOBILITY SCORE: 24

## 2024-05-01 ASSESSMENT — ACTIVITIES OF DAILY LIVING (ADL)
FEEDING YOURSELF: INDEPENDENT
JUDGMENT_ADEQUATE_SAFELY_COMPLETE_DAILY_ACTIVITIES: YES
LACK_OF_TRANSPORTATION: NO
GROOMING: INDEPENDENT
HEARING - RIGHT EAR: FUNCTIONAL
TOILETING: INDEPENDENT
PATIENT'S MEMORY ADEQUATE TO SAFELY COMPLETE DAILY ACTIVITIES?: YES
WALKS IN HOME: INDEPENDENT
DRESSING YOURSELF: INDEPENDENT
HEARING - LEFT EAR: FUNCTIONAL
BATHING: INDEPENDENT
ADEQUATE_TO_COMPLETE_ADL: YES

## 2024-05-01 ASSESSMENT — PATIENT HEALTH QUESTIONNAIRE - PHQ9
SUM OF ALL RESPONSES TO PHQ9 QUESTIONS 1 & 2: 0
1. LITTLE INTEREST OR PLEASURE IN DOING THINGS: NOT AT ALL
2. FEELING DOWN, DEPRESSED OR HOPELESS: NOT AT ALL

## 2024-05-01 ASSESSMENT — PAIN - FUNCTIONAL ASSESSMENT
PAIN_FUNCTIONAL_ASSESSMENT: 0-10

## 2024-05-01 ASSESSMENT — PAIN SCALES - GENERAL
PAINLEVEL_OUTOF10: 0 - NO PAIN
PAINLEVEL_OUTOF10: 4
PAINLEVEL_OUTOF10: 0 - NO PAIN

## 2024-05-01 ASSESSMENT — ENCOUNTER SYMPTOMS
MUSCULOSKELETAL NEGATIVE: 1
HEMATOLOGIC/LYMPHATIC NEGATIVE: 1
CONSTITUTIONAL NEGATIVE: 1
NEUROLOGICAL NEGATIVE: 1
ENDOCRINE NEGATIVE: 1
EYES NEGATIVE: 1
CARDIOVASCULAR NEGATIVE: 1
PSYCHIATRIC NEGATIVE: 1
SHORTNESS OF BREATH: 1
GASTROINTESTINAL NEGATIVE: 1
ALLERGIC/IMMUNOLOGIC NEGATIVE: 1

## 2024-05-01 ASSESSMENT — LIFESTYLE VARIABLES
HOW OFTEN DO YOU HAVE 6 OR MORE DRINKS ON ONE OCCASION: LESS THAN MONTHLY
HOW MANY STANDARD DRINKS CONTAINING ALCOHOL DO YOU HAVE ON A TYPICAL DAY: 3 OR 4
HOW OFTEN DO YOU HAVE A DRINK CONTAINING ALCOHOL: 2-4 TIMES A MONTH
SKIP TO QUESTIONS 9-10: 0
AUDIT-C TOTAL SCORE: 4
AUDIT-C TOTAL SCORE: 4

## 2024-05-01 ASSESSMENT — COLUMBIA-SUICIDE SEVERITY RATING SCALE - C-SSRS
1. IN THE PAST MONTH, HAVE YOU WISHED YOU WERE DEAD OR WISHED YOU COULD GO TO SLEEP AND NOT WAKE UP?: NO
6. HAVE YOU EVER DONE ANYTHING, STARTED TO DO ANYTHING, OR PREPARED TO DO ANYTHING TO END YOUR LIFE?: NO
2. HAVE YOU ACTUALLY HAD ANY THOUGHTS OF KILLING YOURSELF?: NO

## 2024-05-01 NOTE — H&P
History Of Present Illness  Edward D Hume is a 73 y.o. male who presented to outpatient cardiology visit on 4/30/24 with complaints of unstable angina. PMHx CAD, CKD, HTN, HLD, IDDM2, GERD. LHC today demonstrated MVD. Dr. Anthony and Dr. Bhumi Price discussed and decision made to admit as inpatient and proceed with CABG tomorrow. Pt admitted to [facility] under cardiac surgery for planned MIDCAB tomorrow, Thursday 5/2/24, with Dr. Bhumi Price.     Past Medical History  Past Medical History:   Diagnosis Date    Acute nonparalytic poliomyelitis ([facility])     Acute nonparalytic polio    Body mass index (BMI) 25.0-25.9, adult 10/26/2019    Body mass index (BMI) of 25.0 to 25.9 in adult    Body mass index (BMI) 27.0-27.9, adult 09/22/2021    Body mass index (BMI) of 27.0 to 27.9 in adult    Body mass index (BMI) 29.0-29.9, adult 08/31/2019    Body mass index (BMI) of 29.0 to 29.9 in adult    Crushing injury of unspecified finger(s), initial encounter 06/12/2014    Injury, crush, finger    Diabetes mellitus (Multi)     Disorder of the skin and subcutaneous tissue, unspecified 09/04/2019    Skin lesion    Disorder of the skin and subcutaneous tissue, unspecified 03/30/2016    Skin lesion    Elevated prostate specific antigen (PSA)     Elevated prostate specific antigen (PSA)    History of falling 06/27/2016    History of fall    Other chest pain 03/07/2018    Chest pressure    Other conditions influencing health status 02/22/2017    Type 2 diabetes mellitus, uncontrolled    Other conditions influencing health status 03/04/2016    Epicondylitis    Other conditions influencing health status     Nephrolithiasis    Personal history of other mental and behavioral disorders 11/06/2018    History of depression    Personal history of other specified conditions 04/16/2019    History of dizziness    Personal history of other specified conditions 04/16/2019    History of weight loss    Personal history of other specified conditions  06/29/2016    History of abdominal pain    Puncture wound without foreign body of left hand, initial encounter 03/05/2019    Puncture wound of hand, left    Puncture wound without foreign body of unspecified hand, initial encounter 03/05/2019    Puncture wound, hand    Superficial mycosis, unspecified 12/01/2014    Fungal otitis externa    Trigger finger, left middle finger 07/24/2017    Trigger middle finger of left hand    Trigger finger, left middle finger 07/24/2017    Trigger middle finger of left hand    Trigger finger, left ring finger 07/24/2017    Trigger ring finger of left hand    Trigger finger, right index finger 07/24/2017    Trigger index finger of right hand    Trigger finger, right middle finger 11/10/2017    Trigger middle finger of right hand       Surgical History  Past Surgical History:   Procedure Laterality Date    HAND TENDON SURGERY  02/15/2013    Hand Incision Tendon Sheath Of A Finger    OTHER SURGICAL HISTORY  04/09/2013    Needle Biopsy Of Prostate    OTHER SURGICAL HISTORY  07/02/2020    Retinal detachment repair    OTHER SURGICAL HISTORY  12/06/2017    Upper Gastrointestinal Endoscopy, Simple Primary Exam    OTHER SURGICAL HISTORY  06/12/2013    Repair Of Retinal Detachment Procedure Detail    OTHER SURGICAL HISTORY  06/12/2013    Surg Rad Resect Tonsil/Tonsil Pillars/Retromol Tri W/O Closure    SHOULDER SURGERY  06/12/2013    Shoulder Surgery        Social History  He reports that he has quit smoking. His smoking use included cigarettes. He has never used smokeless tobacco. He reports current alcohol use. He reports that he does not use drugs.    Family History  No family history on file.     Allergies  Amoxicillin, Metformin, and Iodinated contrast media    Review of Systems  (+) CP and SOB with exertion    Physical Exam  Constitutional:       Appearance: Normal appearance.   Cardiovascular:      Rate and Rhythm: Normal rate and regular rhythm.   Pulmonary:      Effort: Pulmonary  effort is normal.      Comments: Patent airway, conversant, clear voice  Abdominal:      General: There is no distension.      Palpations: Abdomen is soft.   Skin:     General: Skin is warm and dry.   Neurological:      General: No focal deficit present.      Mental Status: He is alert and oriented to person, place, and time.   Psychiatric:         Mood and Affect: Mood normal.         Behavior: Behavior normal.       Last Recorded Vitals  Blood pressure 170/80, pulse 78, temperature 36 °C (96.8 °F), temperature source Tympanic, resp. rate 18, SpO2 100%.    Relevant Results  Scheduled medications  [START ON 5/2/2024] aspirin, 81 mg, oral, Daily  [START ON 5/2/2024] atenolol, 50 mg, oral, Daily  chlorhexidine, 15 mL, Mouth/Throat, BID  insulin lispro, 0-5 Units, subcutaneous, TID with meals  mupirocin, 0.5 Application, Topical, BID  [START ON 5/2/2024] pantoprazole, 40 mg, oral, Daily before breakfast   Or  [START ON 5/2/2024] pantoprazole, 40 mg, intravenous, Daily before breakfast      Continuous medications     PRN medications  PRN medications: acetaminophen **OR** acetaminophen **OR** acetaminophen, albuterol, dextrose, dextrose, glucagon, glucagon    LABS:  CMP:  Results from last 7 days   Lab Units 04/30/24  1100   SODIUM mmol/L 140   POTASSIUM mmol/L 4.2   CHLORIDE mmol/L 104   CO2 mmol/L 26   ANION GAP mmol/L 14   BUN mg/dL 29*   CREATININE mg/dL 1.44*   EGFR mL/min/1.73m*2 52*     CBC:  Results from last 7 days   Lab Units 04/30/24  1100   WBC AUTO x10*3/uL 5.5   HEMOGLOBIN g/dL 13.3*   HEMATOCRIT % 38.8*   PLATELETS AUTO x10*3/uL 185   MCV fL 91     COAG:   Results from last 7 days   Lab Units 04/30/24  1100   INR  1.1     HEME/ENDO:     CARDIAC:          Assessment/Plan   Principal Problem:    Unstable angina (Multi)  Edward D Hume is a 73 y.o. male who presented to outpatient cardiology visit on 4/30/24 with complaints of unstable angina. PMHx CAD, CKD, HTN, HLD, IDDM2, GERD. C today demonstrated MVD.  Dr. Anthony and Dr. Bhumi Price discussed and decision made to admit as inpatient and proceed with CABG tomorrow. Pt admitted to [facility] SDU under cardiac surgery for planned MIDCAB tomorrow, Thursday 5/2/24, with Dr. Bhumi Price.    Neuro:   # Arthritis  # BPPV    CV:   # Unstable angina, CAD  # HTN, HLD  TTE pending  Carotids, MR angio completed 2/2024  - Home ASA / gemfibrozil / atenolol  - Hold P2Y12 inhibitors  - Hold home valsartan-hydrochlorothiazide and other ACEI/ARBs  - Hold home norvasc  - Notify cardiac surgery GIOVANNY for the following:     -> Chest pain, discomfort, pressure     -> Syncope, near syncope, uncontrolled pain     -> Other red flag symptoms    Pulm:   CXR, CT chest noncon, PFTs pending  - Home albuterol prn    GI:   #GERD  - Preop LFTs  - Home PPI  - NPO at midnight    Renal:   # CKD  - Preop RFP    Heme:   - Preop CBC with diff ordered  - T&S and blood ordered  - Ok for Lovenox or SQH; hold day of surgery    Endo:   # IDDM2  2/2024 A1C 5.8  TSH pending  - POCT BG, ISS q4  - Hold home Lantus 45u, glipizide, Januvia    ID:   # Gout  UA, MRSA pending  - CHG bath, Peridex mouthwash night before and morning of surgery  - Hold home allopurinol    Discussed with Dr. Bhumi Price.    Dispo: SDU, tentatively scheduled for MIDCAB tomorrow 5/2/24 with Dr. Bhumi Price       I spent 90 minutes in the professional and overall critical care of this patient.      Tati Scanlon PA-C

## 2024-05-01 NOTE — NURSING NOTE
Pt oriented to room and call bell. Heart monitor on. Vitals taken/stable. Pt has no current complaints. Will continue to monitor

## 2024-05-01 NOTE — H&P
History Of Present Illness  Edward D Hume is a 73 y.o. male presenting with unstable angina. PMH includes HTN, HLD, CKD, DMII, GERD, elevated calcum score 1736.09     Past Medical History  He has a past medical history of Acute nonparalytic poliomyelitis ([facility]), Body mass index (BMI) 25.0-25.9, adult (10/26/2019), Body mass index (BMI) 27.0-27.9, adult (09/22/2021), Body mass index (BMI) 29.0-29.9, adult (08/31/2019), Crushing injury of unspecified finger(s), initial encounter (06/12/2014), Diabetes mellitus (Multi), Disorder of the skin and subcutaneous tissue, unspecified (09/04/2019), Disorder of the skin and subcutaneous tissue, unspecified (03/30/2016), Elevated prostate specific antigen (PSA), History of falling (06/27/2016), Other chest pain (03/07/2018), Other conditions influencing health status (02/22/2017), Other conditions influencing health status (03/04/2016), Other conditions influencing health status, Personal history of other mental and behavioral disorders (11/06/2018), Personal history of other specified conditions (04/16/2019), Personal history of other specified conditions (04/16/2019), Personal history of other specified conditions (06/29/2016), Puncture wound without foreign body of left hand, initial encounter (03/05/2019), Puncture wound without foreign body of unspecified hand, initial encounter (03/05/2019), Superficial mycosis, unspecified (12/01/2014), Trigger finger, left middle finger (07/24/2017), Trigger finger, left middle finger (07/24/2017), Trigger finger, left ring finger (07/24/2017), Trigger finger, right index finger (07/24/2017), and Trigger finger, right middle finger (11/10/2017).    Surgical History  He has a past surgical history that includes Hand tendon surgery (02/15/2013); Other surgical history (04/09/2013); Other surgical history (07/02/2020); Other surgical history (12/06/2017); Other surgical history (06/12/2013); Other surgical history (06/12/2013); and  Shoulder surgery (06/12/2013).     Social History  He reports that he has quit smoking. His smoking use included cigarettes. He has never used smokeless tobacco. He reports current alcohol use. He reports that he does not use drugs.    Family History  No family history on file.     Allergies  Amoxicillin, Metformin, and Iodinated contrast media    Home Medications  No current facility-administered medications on file prior to encounter.     Current Outpatient Medications on File Prior to Encounter   Medication Sig Dispense Refill    albuterol 90 mcg/actuation inhaler Inhale 2 puffs every 6 hours if needed for shortness of breath. 18 g 2    allopurinol (Zyloprim) 300 mg tablet Take 1 tablet (300 mg) by mouth once daily. 30 tablet 4    amLODIPine (Norvasc) 10 mg tablet Take 1 tablet (10 mg) by mouth once daily. 30 tablet 4    aspirin 81 mg EC tablet Take 1 tablet (81 mg) by mouth once daily. 30 tablet 4    atenolol (Tenormin) 50 mg tablet TAKE 1 TABLET BY MOUTH EVERY DAY 30 tablet 3    biotin 10 mg tablet Take 1 tablet (10 mg) by mouth once daily.      blood sugar diagnostic (Accu-Chek Kamala Plus test strp) strip 1 strip 2 times a day.      cranberry fruit extract (cranberry extract) 300 mg tablet Take 650 mg by mouth once daily.      famotidine (Pepcid) 20 mg tablet Take 1 tablet (20 mg) by mouth once daily at bedtime. 30 tablet 4    gemfibrozil (Lopid) 600 mg tablet TAKE 1 TABLET (600 MG) BY MOUTH 2 TIMES A DAY. 60 tablet 2    glipiZIDE XL (Glucotrol XL) 10 mg 24 hr tablet Take 2 tablets (20 mg) by mouth once daily. 60 tablet 2    ibuprofen 200 mg tablet Take 1 tablet (200 mg) by mouth every 6 hours if needed.      Januvia 100 mg tablet Take 1 tablet (100 mg) by mouth once daily. 30 tablet 3    ketoconazole (NIZOral) 2 % cream Apply topically 2 times a day. To affected areas of ears 60 g 11    omeprazole (PriLOSEC) 40 mg DR capsule Take 1 capsule (40 mg) by mouth once daily in the morning. Take before meals. 30  "capsule 4    pen needle, diabetic (BD Ultra-Fine Short Pen Needle) 31 gauge x 5/16\" needle USE AS DIRECTED 100 each 3    tamsulosin (Flomax) 0.4 mg 24 hr capsule Take 1 capsule (0.4 mg) by mouth 2 times a day. 180 capsule 0    valsartan-hydrochlorothiazide (Diovan-HCT) 160-12.5 mg tablet Take 1 tablet by mouth once daily. 30 tablet 4    dextrose 50 % injection Infuse 50 mL (25 g) into a venous catheter.      [] predniSONE (Deltasone) 20 mg tablet Take 2 tablets (40 mg) by mouth 1 time for 1 dose. Take 2 tablets now, take 2 tablets tonight and take 2 tablets tomorrow morning 6 tablet 0    [DISCONTINUED] gemfibrozil (Lopid) 600 mg tablet Take 1 tablet (600 mg) by mouth 2 times a day. 30 tablet 4    [DISCONTINUED] insulin glargine (Toujeo SoloStar U-300 Insulin) 300 unit/mL (1.5 mL) injection Inject 30 Units under the skin once daily in the morning. Please note change in dose -36 units daily            Inpatient Medications:  Scheduled medications   Medication Dose Route Frequency    diphenhydrAMINE  50 mg oral Once    famotidine  20 mg oral Once    predniSONE  50 mg oral Once     PRN medications   Medication     Continuous Medications   Medication Dose Last Rate    sodium chloride 0.9%  100 mL/hr 100 mL/hr (24 1015)         Review of Systems   Constitutional: Negative.    HENT: Negative.     Eyes: Negative.    Respiratory:  Positive for shortness of breath.    Cardiovascular: Negative.    Gastrointestinal: Negative.    Endocrine: Negative.    Genitourinary: Negative.    Musculoskeletal: Negative.    Skin: Negative.    Allergic/Immunologic: Negative.    Neurological: Negative.    Hematological: Negative.    Psychiatric/Behavioral: Negative.            Physical Exam  Constitutional:       General: He is awake. He is not in acute distress.     Appearance: He is not ill-appearing.   Cardiovascular:      Rate and Rhythm: Normal rate and regular rhythm.      Pulses: Normal pulses.           Radial pulses are 2+ " on the right side and 2+ on the left side.        Dorsalis pedis pulses are 2+ on the right side and 2+ on the left side.      Heart sounds: Normal heart sounds. No murmur heard.  Pulmonary:      Effort: Pulmonary effort is normal.      Breath sounds: Normal breath sounds and air entry.   Abdominal:      General: Bowel sounds are normal.      Palpations: Abdomen is soft.      Tenderness: There is no abdominal tenderness.   Musculoskeletal:      Right lower leg: No edema.      Left lower leg: No edema.   Skin:     General: Skin is warm and dry.   Neurological:      General: No focal deficit present.      Mental Status: He is alert and oriented to person, place, and time.      GCS: GCS eye subscore is 4. GCS verbal subscore is 5. GCS motor subscore is 6.   Psychiatric:         Mood and Affect: Mood normal.         Behavior: Behavior is cooperative.        Sedation Plan    ASA 3     Mallampati class: III.         Last Recorded Vitals  Blood pressure 178/87, pulse 81, temperature 36 °C (96.8 °F), temperature source Tympanic, resp. rate 20, SpO2 95%.         Vitals from the Past 24 Hours  Heart Rate:  [81]   Temp:  [36 °C (96.8 °F)]   Resp:  [20]   BP: (178)/(87)   SpO2:  [95 %]          Relevant Results    Labs    CBC:   Recent Labs     04/30/24  1100 02/25/24  0331 02/24/24  0351 02/23/24  0432 02/22/24  1305 09/25/23  1202   WBC 5.5 8.4 6.3 5.6 5.8 4.9   HGB 13.3* 12.2* 13.2* 12.7* 13.5 13.3*   HCT 38.8* 37.0* 39.3* 36.5* 39.9* 38.8*    170 177 150 170 176   MCV 91 93 92 93 92 94     BMP/CMP:   Recent Labs     04/30/24  1100 02/25/24  0331 02/24/24  0351 02/23/24  0432 02/22/24  1305 09/25/23  1202 02/13/23  0940 06/13/22  0737 09/22/21  0900 04/26/21  1120    139 139 142 138 142 142 140 143 143   K 4.2 3.4* 4.3 4.0 3.9 4.0 4.3 4.0 4.3 4.1    105 105 105 103 107 105 105 107 103   BUN 29* 36* 28* 30* 29* 25* 29* 23 23 25*   CREATININE 1.44* 1.55* 1.34* 1.32* 1.43* 1.37* 1.33* 1.25 1.23 1.27   CO2 26  "24 24 27 27 26 26 24 28 30   CALCIUM 8.6 8.8 8.7 9.1 9.0 9.2 9.1 9.2 9.4 9.8   PROT  --   --   --   --  7.1 7.0 7.1 6.8 7.0 7.3   BILITOT  --   --   --   --  0.5 0.5 0.5 0.4 0.5 0.5   ALKPHOS  --   --   --   --  51 55 48 54 59 63   ALT  --   --   --   --  14 13 13 12 13 15   AST  --   --   --   --  14 15 14 13 14 15   GLUCOSE 178* 83 200* 93 123* 102* 138* 156* 119* 147*      Lipid Panel:   Recent Labs     02/23/24  0432 09/25/23  1202 02/13/23  0940 06/13/22  0737 09/22/21  0900 04/26/21  1120 06/09/20  0840 04/17/19  0745 03/07/18  1050 12/06/17  0918   CHOL 140 150 157 131 149 169 153 151 159 143   HDL 22.7 28.1* 31.5* 30.0* 25.5* 28.3* 27.5* 28.8* 31.5* 28.7*   CHHDL 6.2 5.3* 5.0 4.4 5.8* 6.0* 5.6* 5.2* 5.0 5.0   VLDL 52* 61* 39 44* 40 55* 61* 47* 66* 60*   TRIG 258* 303* 197* 218* 198* 277* 306* 236* 328* 298*   NHDL 117 122  --  101  --  141 126 122 128 114     Cardiac       No lab exists for component: \"CK\", \"CKMBP\"   Hemoglobin A1C:   Recent Labs     02/23/24  0432 02/12/24  1001 09/25/23  1202 06/20/23  0911 02/13/23  0940 06/13/22  0737 09/22/21  0900 04/26/21  1120 10/30/20  1228 06/09/20  0840 04/17/19  0745   HGBA1C 5.8*  5.7* 6.0 5.5 5.8 6.0* 5.9* 5.6 6.5 6.0 6.7 6.8     TSH/ Free T4:   Recent Labs     09/25/23  1202 02/13/23  0940 06/13/22  0737 09/22/21  0900 04/26/21  1120 06/09/20  0840 04/17/19  0745   TSH 1.29 1.66 1.59 1.57 1.46 1.20 1.40     Iron: No results for input(s): \"FERRITIN\", \"TIBC\", \"IRONSAT\", \"BNP\" in the last 66717 hours.  Coag:   Results from last 7 days   Lab Units 04/30/24  1100   INR  1.1     ABO: No results found for: \"ABO\"    Past Cardiology Tests (Last 3 Years):  EKG:  Encounter Date: 04/30/24   ECG 12 lead (Clinic Performed)   Result Value    Ventricular Rate 68    Atrial Rate 68    WI Interval 188    QRS Duration 102    QT Interval 420    QTC Calculation(Bazett) 446    P Axis 62    R Axis 50    T Axis 52    QRS Count 11    Q Onset 208    P Onset 114    P Offset 168    T " "Offset 418    QTC Fredericia 438    Narrative    Sinus rhythm with occasional Premature ventricular complexes  Otherwise normal ECG  Confirmed by Jerrod Anthony ([id number]) on 4/30/2024 2:54:35 PM     Echo:  No results found for this or any previous visit.    Ejection Fractions:  No results found for: \"EF\"  Cath:  No results found for this or any previous visit.    Stress Test:  Results for orders placed in visit on 08/31/22    Nuclear Stress Test      STUDY:  CARDIAC STRESS/REST INJECTION; PART 2 STRESS OR REST (NO CHARGE);  CARDIAC STRESS/REST (MYOCARDIAL PERFUSION/MIBI);  8/31/2022 12:43 pm    FINDINGS:  Stress and rest images both demonstrate a normal distribution of  perfusion throughout all LV segments with no sign of ischemia.    ECG-gated images demonstrate normal LV size and myocardial  contractility with an LV ejection fraction of   54 % (normal above 45  percent).    Impression  1. Normal stress myocardial perfusion imaging in response to  pharmacologic stress.  2. Well-maintained left ventricular function.        Cardiac Imaging:  No results found for this or any previous visit.      === 02/22/24 ===    MR NECK ANGIO WO IV CONTRAST    - Impression -  MRI Brain:    Nonspecific white matter changes most likely represent small-vessel  ischemic disease in a patient of this age. No evidence of acute  ischemic injury.    MRA:    1. No hemodynamically significant stenosis or proximal large branch  vessel cutoff on MRA of the head.  2. Atherosclerotic changes of the carotid bifurcations and proximal  ICAs, right greater than left. Luminal irregularity and focal  outpouching along the proximal right ICA may reflect prominent  atheromatous ulceration or conceivably a pseudoaneurysm although  atheromatous ulceration is more typical in this location.    === 02/22/24 ===    CT HEAD WO IV CONTRAST    - Impression -  1. No acute intracranial abnormality.    2. Mild burden of supratentorial chronic small vessel " ischemic  disease.         Assessment/Plan  Assessment/Plan   Active Problems:  There are no active Hospital Problems.    Unstable angina  -C with Dr. Caldera on 5/1/24    Contrast allergy  -received prednisone 3 doses PTA  -benadryl and pepcid IV prior to procedure       I spent 30 minutes in the professional and overall care of this patient.      Jarrett Bruce, [facility]-CNP, DNP

## 2024-05-01 NOTE — Clinical Note
Closure device placed in the right radial artery. Site closed by radial compression system. 17ml air

## 2024-05-01 NOTE — POST-PROCEDURE NOTE
Physician Transition of Care Summary  Invasive Cardiovascular Lab    Procedure Date: 5/1/2024  Attending:    * Jerrod Anthony - Primary  Resident/Fellow/Other Assistant: Surgeons and Role:  * No surgeons found with a matching role *    Indications:   Pre-op Diagnosis     * Unstable angina (Multi) [I20.0]    Post-procedure diagnosis:   Post-op Diagnosis     * Unstable angina (Multi) [I20.0]    Procedure(s):   Left Heart Cath with Coronary Angiography and LV  28385 - RI CATH PLMT L HRT & ARTS W/NJX & ANGIO IMG S&I    Left Heart Cath with Coronary Angiography and LV  24191 - RI PRQ TRLUML CORONARY STENT W/ANGIO ONE ART/BRNCH        Procedure Findings:   Severe two disease involved mid LAD 90-99%, LCX mid 60-70%./  RCA mid 40-50%.   LVEDP 15mmHg, no gradient at pullback     Description of the Procedure:   Right radial 6Fr --> Tr band     Complications:   None     Stents/Implants:   Implants       No implant documentation for this case.            Anticoagulation/Antiplatelet Plan:   Aspirin and statin     Estimated Blood Loss:   5 mL    Anesthesia: Moderate Sedation Anesthesia Staff: No anesthesia staff entered.    Any Specimen(s) Removed:   No specimens collected during this procedure.    Disposition:   Admission with CT surgery consult       Electronically signed by: Pretty Rodriguez MD, 5/1/2024 4:39 PM

## 2024-05-02 ENCOUNTER — HOSPITAL ENCOUNTER (INPATIENT)
Dept: CARDIOLOGY | Facility: HOSPITAL | Age: 74
Discharge: HOME | DRG: 234 | End: 2024-05-02
Payer: MEDICARE

## 2024-05-02 ENCOUNTER — ANESTHESIA (OUTPATIENT)
Dept: OPERATING ROOM | Facility: HOSPITAL | Age: 74
DRG: 234 | End: 2024-05-02
Payer: MEDICARE

## 2024-05-02 ENCOUNTER — APPOINTMENT (OUTPATIENT)
Dept: RADIOLOGY | Facility: HOSPITAL | Age: 74
DRG: 234 | End: 2024-05-02
Payer: MEDICARE

## 2024-05-02 ENCOUNTER — APPOINTMENT (OUTPATIENT)
Dept: CARDIOLOGY | Facility: HOSPITAL | Age: 74
DRG: 234 | End: 2024-05-02
Payer: MEDICARE

## 2024-05-02 ENCOUNTER — ANESTHESIA EVENT (OUTPATIENT)
Dept: OPERATING ROOM | Facility: HOSPITAL | Age: 74
DRG: 234 | End: 2024-05-02
Payer: MEDICARE

## 2024-05-02 LAB
ABO GROUP (TYPE) IN BLOOD: NORMAL
ALBUMIN SERPL BCP-MCNC: 3.9 G/DL (ref 3.4–5)
ALBUMIN SERPL BCP-MCNC: 4.1 G/DL (ref 3.4–5)
ALP SERPL-CCNC: 47 U/L (ref 33–136)
ALT SERPL W P-5'-P-CCNC: 12 U/L (ref 10–52)
ANION GAP BLDA CALCULATED.4IONS-SCNC: 10 MMO/L (ref 10–25)
ANION GAP BLDA CALCULATED.4IONS-SCNC: 11 MMO/L (ref 10–25)
ANION GAP BLDA CALCULATED.4IONS-SCNC: 12 MMO/L (ref 10–25)
ANION GAP BLDA CALCULATED.4IONS-SCNC: 15 MMO/L (ref 10–25)
ANION GAP SERPL CALC-SCNC: 14 MMOL/L (ref 10–20)
ANION GAP SERPL CALC-SCNC: 15 MMOL/L (ref 10–20)
AST SERPL W P-5'-P-CCNC: 18 U/L (ref 9–39)
BASE EXCESS BLDA CALC-SCNC: -1.9 MMOL/L (ref -2–3)
BASE EXCESS BLDA CALC-SCNC: -2.3 MMOL/L (ref -2–3)
BASE EXCESS BLDA CALC-SCNC: -3.2 MMOL/L (ref -2–3)
BASE EXCESS BLDA CALC-SCNC: -3.9 MMOL/L (ref -2–3)
BASOPHILS # BLD AUTO: 0.03 X10*3/UL (ref 0–0.1)
BASOPHILS NFR BLD AUTO: 0.3 %
BILIRUB SERPL-MCNC: 0.3 MG/DL (ref 0–1.2)
BODY TEMPERATURE: 37 DEGREES CELSIUS
BUN SERPL-MCNC: 32 MG/DL (ref 6–23)
BUN SERPL-MCNC: 32 MG/DL (ref 6–23)
CA-I BLDA-SCNC: 1.16 MMOL/L (ref 1.1–1.33)
CA-I BLDA-SCNC: 1.18 MMOL/L (ref 1.1–1.33)
CA-I BLDA-SCNC: 1.18 MMOL/L (ref 1.1–1.33)
CA-I BLDA-SCNC: 1.22 MMOL/L (ref 1.1–1.33)
CALCIUM SERPL-MCNC: 8.1 MG/DL (ref 8.6–10.3)
CALCIUM SERPL-MCNC: 8.8 MG/DL (ref 8.6–10.3)
CHLORIDE BLDA-SCNC: 104 MMOL/L (ref 98–107)
CHLORIDE BLDA-SCNC: 106 MMOL/L (ref 98–107)
CHLORIDE SERPL-SCNC: 105 MMOL/L (ref 98–107)
CHLORIDE SERPL-SCNC: 106 MMOL/L (ref 98–107)
CO2 SERPL-SCNC: 22 MMOL/L (ref 21–32)
CO2 SERPL-SCNC: 24 MMOL/L (ref 21–32)
COHGB MFR BLDA: 0.5 %
COHGB MFR BLDA: 1.1 %
COHGB MFR BLDA: 1.2 %
CREAT SERPL-MCNC: 1.04 MG/DL (ref 0.5–1.3)
CREAT SERPL-MCNC: 1.25 MG/DL (ref 0.5–1.3)
DO-HGB MFR BLDA: 1.8 % (ref 0–5)
DO-HGB MFR BLDA: 1.8 % (ref 0–5)
DO-HGB MFR BLDA: 3.6 % (ref 0–5)
EGFRCR SERPLBLD CKD-EPI 2021: 62 ML/MIN/1.73M*2
EGFRCR SERPLBLD CKD-EPI 2021: 77 ML/MIN/1.73M*2
EOSINOPHIL # BLD AUTO: 0.04 X10*3/UL (ref 0–0.4)
EOSINOPHIL NFR BLD AUTO: 0.4 %
ERYTHROCYTE [DISTWIDTH] IN BLOOD BY AUTOMATED COUNT: 12.4 % (ref 11.5–14.5)
ERYTHROCYTE [DISTWIDTH] IN BLOOD BY AUTOMATED COUNT: 12.6 % (ref 11.5–14.5)
GLUCOSE BLD MANUAL STRIP-MCNC: 141 MG/DL (ref 74–99)
GLUCOSE BLD MANUAL STRIP-MCNC: 146 MG/DL (ref 74–99)
GLUCOSE BLD MANUAL STRIP-MCNC: 180 MG/DL (ref 74–99)
GLUCOSE BLD MANUAL STRIP-MCNC: 183 MG/DL (ref 74–99)
GLUCOSE BLD MANUAL STRIP-MCNC: 239 MG/DL (ref 74–99)
GLUCOSE BLDA-MCNC: 126 MG/DL (ref 74–99)
GLUCOSE BLDA-MCNC: 173 MG/DL (ref 74–99)
GLUCOSE BLDA-MCNC: 179 MG/DL (ref 74–99)
GLUCOSE BLDA-MCNC: 243 MG/DL (ref 74–99)
GLUCOSE SERPL-MCNC: 168 MG/DL (ref 74–99)
GLUCOSE SERPL-MCNC: 220 MG/DL (ref 74–99)
HCO3 BLDA-SCNC: 21.6 MMOL/L (ref 22–26)
HCO3 BLDA-SCNC: 24.3 MMOL/L (ref 22–26)
HCO3 BLDA-SCNC: 24.6 MMOL/L (ref 22–26)
HCO3 BLDA-SCNC: 25.1 MMOL/L (ref 22–26)
HCT VFR BLD AUTO: 37.4 % (ref 41–52)
HCT VFR BLD AUTO: 37.6 % (ref 41–52)
HCT VFR BLD EST: 39 % (ref 41–52)
HCT VFR BLD EST: 40 % (ref 41–52)
HCT VFR BLD EST: 40 % (ref 41–52)
HCT VFR BLD EST: 41 % (ref 41–52)
HGB BLD-MCNC: 12.7 G/DL (ref 13.5–17.5)
HGB BLD-MCNC: 12.8 G/DL (ref 13.5–17.5)
HGB BLDA-MCNC: 13.1 G/DL (ref 13.5–17.5)
HGB BLDA-MCNC: 13.1 G/DL (ref 13.5–17.5)
HGB BLDA-MCNC: 13.2 G/DL (ref 13.5–17.5)
HGB BLDA-MCNC: 13.2 G/DL (ref 13.5–17.5)
HGB BLDA-MCNC: 13.4 G/DL (ref 13.5–17.5)
HGB BLDA-MCNC: 13.5 G/DL (ref 13.5–17.5)
HGB BLDA-MCNC: 13.5 G/DL (ref 13.5–17.5)
IMM GRANULOCYTES # BLD AUTO: 0.11 X10*3/UL (ref 0–0.5)
IMM GRANULOCYTES NFR BLD AUTO: 1.1 % (ref 0–0.9)
INHALED O2 CONCENTRATION: 28 %
INHALED O2 CONCENTRATION: 35 %
INHALED O2 CONCENTRATION: 71 %
INHALED O2 CONCENTRATION: 71 %
LACTATE BLDA-SCNC: 1.3 MMOL/L (ref 0.4–2)
LACTATE BLDA-SCNC: 1.3 MMOL/L (ref 0.4–2)
LACTATE BLDA-SCNC: 1.4 MMOL/L (ref 0.4–2)
LACTATE BLDA-SCNC: 1.8 MMOL/L (ref 0.4–2)
LYMPHOCYTES # BLD AUTO: 1.38 X10*3/UL (ref 0.8–3)
LYMPHOCYTES NFR BLD AUTO: 13.4 %
MAGNESIUM SERPL-MCNC: 2 MG/DL (ref 1.6–2.4)
MCH RBC QN AUTO: 31 PG (ref 26–34)
MCH RBC QN AUTO: 31.3 PG (ref 26–34)
MCHC RBC AUTO-ENTMCNC: 34 G/DL (ref 32–36)
MCHC RBC AUTO-ENTMCNC: 34 G/DL (ref 32–36)
MCV RBC AUTO: 91 FL (ref 80–100)
MCV RBC AUTO: 92 FL (ref 80–100)
METHGB MFR BLDA: 0 % (ref 0–1.5)
MONOCYTES # BLD AUTO: 0.89 X10*3/UL (ref 0.05–0.8)
MONOCYTES NFR BLD AUTO: 8.7 %
NEUTROPHILS # BLD AUTO: 7.83 X10*3/UL (ref 1.6–5.5)
NEUTROPHILS NFR BLD AUTO: 76.1 %
NRBC BLD-RTO: 0 /100 WBCS (ref 0–0)
NRBC BLD-RTO: 0 /100 WBCS (ref 0–0)
OXYHGB MFR BLDA: 95 % (ref 94–98)
OXYHGB MFR BLDA: 95.2 % (ref 94–98)
OXYHGB MFR BLDA: 95.2 % (ref 94–98)
OXYHGB MFR BLDA: 97 % (ref 94–98)
OXYHGB MFR BLDA: 97 % (ref 94–98)
OXYHGB MFR BLDA: 97.7 % (ref 94–98)
OXYHGB MFR BLDA: 97.7 % (ref 94–98)
PCO2 BLDA: 40 MM HG (ref 38–42)
PCO2 BLDA: 50 MM HG (ref 38–42)
PCO2 BLDA: 51 MM HG (ref 38–42)
PCO2 BLDA: 53 MM HG (ref 38–42)
PH BLDA: 7.27 PH (ref 7.38–7.42)
PH BLDA: 7.3 PH (ref 7.38–7.42)
PH BLDA: 7.3 PH (ref 7.38–7.42)
PH BLDA: 7.34 PH (ref 7.38–7.42)
PHOSPHATE SERPL-MCNC: 3.6 MG/DL (ref 2.5–4.9)
PLATELET # BLD AUTO: 182 X10*3/UL (ref 150–450)
PLATELET # BLD AUTO: 188 X10*3/UL (ref 150–450)
PO2 BLDA: 110 MM HG (ref 85–95)
PO2 BLDA: 319 MM HG (ref 85–95)
PO2 BLDA: 78 MM HG (ref 85–95)
PO2 BLDA: 86 MM HG (ref 85–95)
POTASSIUM BLDA-SCNC: 3.7 MMOL/L (ref 3.5–5.3)
POTASSIUM BLDA-SCNC: 3.7 MMOL/L (ref 3.5–5.3)
POTASSIUM BLDA-SCNC: 3.8 MMOL/L (ref 3.5–5.3)
POTASSIUM BLDA-SCNC: 4.1 MMOL/L (ref 3.5–5.3)
POTASSIUM SERPL-SCNC: 3.6 MMOL/L (ref 3.5–5.3)
POTASSIUM SERPL-SCNC: 3.7 MMOL/L (ref 3.5–5.3)
PROT SERPL-MCNC: 6.4 G/DL (ref 6.4–8.2)
RBC # BLD AUTO: 4.06 X10*6/UL (ref 4.5–5.9)
RBC # BLD AUTO: 4.13 X10*6/UL (ref 4.5–5.9)
RH FACTOR (ANTIGEN D): NORMAL
SAO2 % BLDA: 96 % (ref 94–100)
SAO2 % BLDA: 96 % (ref 94–100)
SAO2 % BLDA: 98 % (ref 94–100)
SAO2 % BLDA: 98 % (ref 94–100)
SODIUM BLDA-SCNC: 136 MMOL/L (ref 136–145)
SODIUM BLDA-SCNC: 137 MMOL/L (ref 136–145)
SODIUM BLDA-SCNC: 138 MMOL/L (ref 136–145)
SODIUM BLDA-SCNC: 139 MMOL/L (ref 136–145)
SODIUM SERPL-SCNC: 139 MMOL/L (ref 136–145)
SODIUM SERPL-SCNC: 139 MMOL/L (ref 136–145)
TSH SERPL-ACNC: 0.61 MIU/L (ref 0.44–3.98)
WBC # BLD AUTO: 10.3 X10*3/UL (ref 4.4–11.3)
WBC # BLD AUTO: 14.1 X10*3/UL (ref 4.4–11.3)

## 2024-05-02 PROCEDURE — 85027 COMPLETE CBC AUTOMATED: CPT | Performed by: STUDENT IN AN ORGANIZED HEALTH CARE EDUCATION/TRAINING PROGRAM

## 2024-05-02 PROCEDURE — 2500000004 HC RX 250 GENERAL PHARMACY W/ HCPCS (ALT 636 FOR OP/ED)

## 2024-05-02 PROCEDURE — 3600000018 HC OR TIME - INITIAL BASE CHARGE - PROCEDURE LEVEL SIX: Performed by: THORACIC SURGERY (CARDIOTHORACIC VASCULAR SURGERY)

## 2024-05-02 PROCEDURE — 3600000012 HC PERFUSION TIME - EACH INCREMENTAL 1 MINUTE: Performed by: THORACIC SURGERY (CARDIOTHORACIC VASCULAR SURGERY)

## 2024-05-02 PROCEDURE — 3700000001 HC GENERAL ANESTHESIA TIME - INITIAL BASE CHARGE: Performed by: THORACIC SURGERY (CARDIOTHORACIC VASCULAR SURGERY)

## 2024-05-02 PROCEDURE — 2500000004 HC RX 250 GENERAL PHARMACY W/ HCPCS (ALT 636 FOR OP/ED): Performed by: ANESTHESIOLOGIST ASSISTANT

## 2024-05-02 PROCEDURE — 94667 MNPJ CHEST WALL 1ST: CPT

## 2024-05-02 PROCEDURE — 84132 ASSAY OF SERUM POTASSIUM: CPT | Mod: 91

## 2024-05-02 PROCEDURE — 80053 COMPREHEN METABOLIC PANEL: CPT | Performed by: NURSE PRACTITIONER

## 2024-05-02 PROCEDURE — 76937 US GUIDE VASCULAR ACCESS: CPT | Performed by: ANESTHESIOLOGY

## 2024-05-02 PROCEDURE — 93005 ELECTROCARDIOGRAM TRACING: CPT

## 2024-05-02 PROCEDURE — 82947 ASSAY GLUCOSE BLOOD QUANT: CPT

## 2024-05-02 PROCEDURE — 2500000004 HC RX 250 GENERAL PHARMACY W/ HCPCS (ALT 636 FOR OP/ED): Performed by: THORACIC SURGERY (CARDIOTHORACIC VASCULAR SURGERY)

## 2024-05-02 PROCEDURE — 84132 ASSAY OF SERUM POTASSIUM: CPT | Mod: 91 | Performed by: STUDENT IN AN ORGANIZED HEALTH CARE EDUCATION/TRAINING PROGRAM

## 2024-05-02 PROCEDURE — 82947 ASSAY GLUCOSE BLOOD QUANT: CPT | Mod: 91

## 2024-05-02 PROCEDURE — 02100Z9 BYPASS CORONARY ARTERY, ONE ARTERY FROM LEFT INTERNAL MAMMARY, OPEN APPROACH: ICD-10-PCS | Performed by: THORACIC SURGERY (CARDIOTHORACIC VASCULAR SURGERY)

## 2024-05-02 PROCEDURE — 9420000001 HC RT PATIENT EDUCATION 5 MIN

## 2024-05-02 PROCEDURE — 93010 ELECTROCARDIOGRAM REPORT: CPT | Performed by: INTERNAL MEDICINE

## 2024-05-02 PROCEDURE — 84443 ASSAY THYROID STIM HORMONE: CPT | Performed by: NURSE PRACTITIONER

## 2024-05-02 PROCEDURE — 2500000005 HC RX 250 GENERAL PHARMACY W/O HCPCS: Performed by: ANESTHESIOLOGIST ASSISTANT

## 2024-05-02 PROCEDURE — 83735 ASSAY OF MAGNESIUM: CPT | Performed by: STUDENT IN AN ORGANIZED HEALTH CARE EDUCATION/TRAINING PROGRAM

## 2024-05-02 PROCEDURE — 3700000002 HC GENERAL ANESTHESIA TIME - EACH INCREMENTAL 1 MINUTE: Performed by: THORACIC SURGERY (CARDIOTHORACIC VASCULAR SURGERY)

## 2024-05-02 PROCEDURE — 36415 COLL VENOUS BLD VENIPUNCTURE: CPT | Performed by: NURSE PRACTITIONER

## 2024-05-02 PROCEDURE — 71045 X-RAY EXAM CHEST 1 VIEW: CPT

## 2024-05-02 PROCEDURE — 94668 MNPJ CHEST WALL SBSQ: CPT

## 2024-05-02 PROCEDURE — 2500000002 HC RX 250 W HCPCS SELF ADMINISTERED DRUGS (ALT 637 FOR MEDICARE OP, ALT 636 FOR OP/ED): Performed by: ANESTHESIOLOGIST ASSISTANT

## 2024-05-02 PROCEDURE — 94660 CPAP INITIATION&MGMT: CPT

## 2024-05-02 PROCEDURE — 36556 INSERT NON-TUNNEL CV CATH: CPT | Performed by: ANESTHESIOLOGY

## 2024-05-02 PROCEDURE — 2020000001 HC ICU ROOM DAILY

## 2024-05-02 PROCEDURE — 2500000004 HC RX 250 GENERAL PHARMACY W/ HCPCS (ALT 636 FOR OP/ED): Performed by: SURGERY

## 2024-05-02 PROCEDURE — 2500000002 HC RX 250 W HCPCS SELF ADMINISTERED DRUGS (ALT 637 FOR MEDICARE OP, ALT 636 FOR OP/ED): Performed by: STUDENT IN AN ORGANIZED HEALTH CARE EDUCATION/TRAINING PROGRAM

## 2024-05-02 PROCEDURE — 36620 INSERTION CATHETER ARTERY: CPT | Performed by: ANESTHESIOLOGY

## 2024-05-02 PROCEDURE — 2500000001 HC RX 250 WO HCPCS SELF ADMINISTERED DRUGS (ALT 637 FOR MEDICARE OP): Performed by: THORACIC SURGERY (CARDIOTHORACIC VASCULAR SURGERY)

## 2024-05-02 PROCEDURE — 2720000007 HC OR 272 NO HCPCS: Performed by: THORACIC SURGERY (CARDIOTHORACIC VASCULAR SURGERY)

## 2024-05-02 PROCEDURE — 3600000017 HC OR TIME - EACH INCREMENTAL 1 MINUTE - PROCEDURE LEVEL SIX: Performed by: THORACIC SURGERY (CARDIOTHORACIC VASCULAR SURGERY)

## 2024-05-02 PROCEDURE — 85025 COMPLETE CBC W/AUTO DIFF WBC: CPT | Performed by: NURSE PRACTITIONER

## 2024-05-02 PROCEDURE — 85347 COAGULATION TIME ACTIVATED: CPT | Mod: 91

## 2024-05-02 PROCEDURE — A33533 PR CABG, ARTERIAL, SINGLE: Performed by: ANESTHESIOLOGY

## 2024-05-02 PROCEDURE — 37799 UNLISTED PX VASCULAR SURGERY: CPT | Performed by: STUDENT IN AN ORGANIZED HEALTH CARE EDUCATION/TRAINING PROGRAM

## 2024-05-02 PROCEDURE — 85347 COAGULATION TIME ACTIVATED: CPT

## 2024-05-02 PROCEDURE — 2500000005 HC RX 250 GENERAL PHARMACY W/O HCPCS: Performed by: STUDENT IN AN ORGANIZED HEALTH CARE EDUCATION/TRAINING PROGRAM

## 2024-05-02 PROCEDURE — 33533 CABG ARTERIAL SINGLE: CPT | Performed by: THORACIC SURGERY (CARDIOTHORACIC VASCULAR SURGERY)

## 2024-05-02 PROCEDURE — 2500000004 HC RX 250 GENERAL PHARMACY W/ HCPCS (ALT 636 FOR OP/ED): Performed by: STUDENT IN AN ORGANIZED HEALTH CARE EDUCATION/TRAINING PROGRAM

## 2024-05-02 PROCEDURE — A33533 PR CABG, ARTERIAL, SINGLE: Performed by: ANESTHESIOLOGIST ASSISTANT

## 2024-05-02 PROCEDURE — 99100 ANES PT EXTEME AGE<1 YR&>70: CPT | Performed by: ANESTHESIOLOGY

## 2024-05-02 PROCEDURE — 3600000011 HC PERFUSION TIME - INITIAL BASE CHARGE: Performed by: THORACIC SURGERY (CARDIOTHORACIC VASCULAR SURGERY)

## 2024-05-02 PROCEDURE — 83050 HGB METHEMOGLOBIN QUAN: CPT | Mod: 91

## 2024-05-02 PROCEDURE — 2500000001 HC RX 250 WO HCPCS SELF ADMINISTERED DRUGS (ALT 637 FOR MEDICARE OP): Performed by: STUDENT IN AN ORGANIZED HEALTH CARE EDUCATION/TRAINING PROGRAM

## 2024-05-02 RX ORDER — INSULIN LISPRO 100 [IU]/ML
0-15 INJECTION, SOLUTION INTRAVENOUS; SUBCUTANEOUS EVERY 4 HOURS
Status: DISCONTINUED | OUTPATIENT
Start: 2024-05-02 | End: 2024-05-03

## 2024-05-02 RX ORDER — SODIUM CHLORIDE, SODIUM LACTATE, POTASSIUM CHLORIDE, CALCIUM CHLORIDE 600; 310; 30; 20 MG/100ML; MG/100ML; MG/100ML; MG/100ML
5 INJECTION, SOLUTION INTRAVENOUS CONTINUOUS
Status: DISCONTINUED | OUTPATIENT
Start: 2024-05-02 | End: 2024-05-04

## 2024-05-02 RX ORDER — ONDANSETRON 4 MG/1
4 TABLET, ORALLY DISINTEGRATING ORAL EVERY 8 HOURS PRN
Status: DISCONTINUED | OUTPATIENT
Start: 2024-05-02 | End: 2024-05-04

## 2024-05-02 RX ORDER — KETOROLAC TROMETHAMINE 30 MG/ML
INJECTION, SOLUTION INTRAMUSCULAR; INTRAVENOUS AS NEEDED
Status: DISCONTINUED | OUTPATIENT
Start: 2024-05-02 | End: 2024-05-02

## 2024-05-02 RX ORDER — ROCURONIUM BROMIDE 10 MG/ML
INJECTION, SOLUTION INTRAVENOUS AS NEEDED
Status: DISCONTINUED | OUTPATIENT
Start: 2024-05-02 | End: 2024-05-02

## 2024-05-02 RX ORDER — KETOROLAC TROMETHAMINE 30 MG/ML
15 INJECTION, SOLUTION INTRAMUSCULAR; INTRAVENOUS EVERY 6 HOURS PRN
Status: DISCONTINUED | OUTPATIENT
Start: 2024-05-02 | End: 2024-05-03

## 2024-05-02 RX ORDER — SODIUM CHLORIDE, SODIUM GLUCONATE, SODIUM ACETATE, POTASSIUM CHLORIDE AND MAGNESIUM CHLORIDE 30; 37; 368; 526; 502 MG/100ML; MG/100ML; MG/100ML; MG/100ML; MG/100ML
INJECTION, SOLUTION INTRAVENOUS CONTINUOUS PRN
Status: DISCONTINUED | OUTPATIENT
Start: 2024-05-02 | End: 2024-05-02

## 2024-05-02 RX ORDER — PHENYLEPHRINE 10 MG/250 ML(40 MCG/ML)IN 0.9 % SOD.CHLORIDE INTRAVENOUS
0-9 CONTINUOUS
Status: DISCONTINUED | OUTPATIENT
Start: 2024-05-02 | End: 2024-05-02

## 2024-05-02 RX ORDER — FENTANYL CITRATE 50 UG/ML
INJECTION, SOLUTION INTRAMUSCULAR; INTRAVENOUS AS NEEDED
Status: DISCONTINUED | OUTPATIENT
Start: 2024-05-02 | End: 2024-05-02

## 2024-05-02 RX ORDER — NALOXONE HYDROCHLORIDE 0.4 MG/ML
0.2 INJECTION, SOLUTION INTRAMUSCULAR; INTRAVENOUS; SUBCUTANEOUS EVERY 5 MIN PRN
Status: DISCONTINUED | OUTPATIENT
Start: 2024-05-02 | End: 2024-05-06 | Stop reason: HOSPADM

## 2024-05-02 RX ORDER — ACETAMINOPHEN 325 MG/1
650 TABLET ORAL EVERY 6 HOURS
Status: DISCONTINUED | OUTPATIENT
Start: 2024-05-02 | End: 2024-05-06 | Stop reason: HOSPADM

## 2024-05-02 RX ORDER — POLYETHYLENE GLYCOL 3350 17 G/17G
17 POWDER, FOR SOLUTION ORAL DAILY
Status: DISCONTINUED | OUTPATIENT
Start: 2024-05-02 | End: 2024-05-06 | Stop reason: HOSPADM

## 2024-05-02 RX ORDER — PHENYLEPHRINE HCL IN 0.9% NACL 1 MG/10 ML
SYRINGE (ML) INTRAVENOUS AS NEEDED
Status: DISCONTINUED | OUTPATIENT
Start: 2024-05-02 | End: 2024-05-02

## 2024-05-02 RX ORDER — NOREPINEPHRINE BITARTRATE 0.03 MG/ML
INJECTION, SOLUTION INTRAVENOUS CONTINUOUS PRN
Status: DISCONTINUED | OUTPATIENT
Start: 2024-05-02 | End: 2024-05-02

## 2024-05-02 RX ORDER — AMOXICILLIN 250 MG
2 CAPSULE ORAL 2 TIMES DAILY
Status: DISCONTINUED | OUTPATIENT
Start: 2024-05-02 | End: 2024-05-06 | Stop reason: HOSPADM

## 2024-05-02 RX ORDER — PAPAVERINE HYDROCHLORIDE 30 MG/ML
INJECTION INTRAMUSCULAR; INTRAVENOUS AS NEEDED
Status: DISCONTINUED | OUTPATIENT
Start: 2024-05-02 | End: 2024-05-02 | Stop reason: HOSPADM

## 2024-05-02 RX ORDER — ONDANSETRON HYDROCHLORIDE 2 MG/ML
4 INJECTION, SOLUTION INTRAVENOUS EVERY 8 HOURS PRN
Status: DISCONTINUED | OUTPATIENT
Start: 2024-05-02 | End: 2024-05-06 | Stop reason: HOSPADM

## 2024-05-02 RX ORDER — HYDROMORPHONE HYDROCHLORIDE 1 MG/ML
1 INJECTION, SOLUTION INTRAMUSCULAR; INTRAVENOUS; SUBCUTANEOUS ONCE
Status: COMPLETED | OUTPATIENT
Start: 2024-05-02 | End: 2024-05-02

## 2024-05-02 RX ORDER — DEXMEDETOMIDINE HYDROCHLORIDE 4 UG/ML
.1-1.5 INJECTION, SOLUTION INTRAVENOUS CONTINUOUS
Status: DISCONTINUED | OUTPATIENT
Start: 2024-05-02 | End: 2024-05-02

## 2024-05-02 RX ORDER — DEXMEDETOMIDINE HYDROCHLORIDE 4 UG/ML
.1-1.5 INJECTION, SOLUTION INTRAVENOUS CONTINUOUS
Status: DISCONTINUED | OUTPATIENT
Start: 2024-05-02 | End: 2024-05-03

## 2024-05-02 RX ORDER — DEXTROSE 50 % IN WATER (D50W) INTRAVENOUS SYRINGE
25
Status: DISCONTINUED | OUTPATIENT
Start: 2024-05-02 | End: 2024-05-06 | Stop reason: HOSPADM

## 2024-05-02 RX ORDER — LIDOCAINE HYDROCHLORIDE 20 MG/ML
INJECTION, SOLUTION INFILTRATION; PERINEURAL AS NEEDED
Status: DISCONTINUED | OUTPATIENT
Start: 2024-05-02 | End: 2024-05-02

## 2024-05-02 RX ORDER — NAPROXEN SODIUM 220 MG/1
81 TABLET, FILM COATED ORAL DAILY
Status: DISCONTINUED | OUTPATIENT
Start: 2024-05-02 | End: 2024-05-06 | Stop reason: HOSPADM

## 2024-05-02 RX ORDER — CEFAZOLIN 1 G/1
INJECTION, POWDER, FOR SOLUTION INTRAVENOUS AS NEEDED
Status: DISCONTINUED | OUTPATIENT
Start: 2024-05-02 | End: 2024-05-02

## 2024-05-02 RX ORDER — MIDAZOLAM HYDROCHLORIDE 1 MG/ML
INJECTION, SOLUTION INTRAMUSCULAR; INTRAVENOUS AS NEEDED
Status: DISCONTINUED | OUTPATIENT
Start: 2024-05-02 | End: 2024-05-02

## 2024-05-02 RX ORDER — PROPOFOL 10 MG/ML
INJECTION, EMULSION INTRAVENOUS AS NEEDED
Status: DISCONTINUED | OUTPATIENT
Start: 2024-05-02 | End: 2024-05-02

## 2024-05-02 RX ORDER — PROTAMINE SULFATE 10 MG/ML
INJECTION, SOLUTION INTRAVENOUS AS NEEDED
Status: DISCONTINUED | OUTPATIENT
Start: 2024-05-02 | End: 2024-05-02

## 2024-05-02 RX ORDER — ROPIVACAINE HYDROCHLORIDE 5 MG/ML
INJECTION, SOLUTION EPIDURAL; INFILTRATION; PERINEURAL
Status: DISPENSED
Start: 2024-05-02 | End: 2024-05-03

## 2024-05-02 RX ORDER — LIDOCAINE 560 MG/1
1 PATCH PERCUTANEOUS; TOPICAL; TRANSDERMAL EVERY 24 HOURS
Status: COMPLETED | OUTPATIENT
Start: 2024-05-02 | End: 2024-05-05

## 2024-05-02 RX ORDER — PANTOPRAZOLE SODIUM 40 MG/1
40 TABLET, DELAYED RELEASE ORAL
Status: DISCONTINUED | OUTPATIENT
Start: 2024-05-03 | End: 2024-05-06 | Stop reason: HOSPADM

## 2024-05-02 RX ORDER — NOREPINEPHRINE BITARTRATE/D5W 8 MG/250ML
.01-.5 PLASTIC BAG, INJECTION (ML) INTRAVENOUS CONTINUOUS
Status: DISCONTINUED | OUTPATIENT
Start: 2024-05-02 | End: 2024-05-02

## 2024-05-02 RX ORDER — TRANEXAMIC ACID 10 MG/ML
5000 INJECTION, SOLUTION INTRAVENOUS ONCE
Status: DISCONTINUED | OUTPATIENT
Start: 2024-05-02 | End: 2024-05-02

## 2024-05-02 RX ORDER — POTASSIUM CHLORIDE 14.9 MG/ML
20 INJECTION INTRAVENOUS ONCE
Status: COMPLETED | OUTPATIENT
Start: 2024-05-02 | End: 2024-05-02

## 2024-05-02 RX ORDER — EPINEPHRINE HCL IN DEXTROSE 5% 4MG/250ML
.01-1 PLASTIC BAG, INJECTION (ML) INTRAVENOUS CONTINUOUS
Status: DISCONTINUED | OUTPATIENT
Start: 2024-05-02 | End: 2024-05-02

## 2024-05-02 RX ORDER — EPINEPHRINE 1 MG/ML
INJECTION, SOLUTION, CONCENTRATE INTRAVENOUS
Status: DISPENSED
Start: 2024-05-02 | End: 2024-05-03

## 2024-05-02 RX ORDER — PANTOPRAZOLE SODIUM 40 MG/10ML
40 INJECTION, POWDER, LYOPHILIZED, FOR SOLUTION INTRAVENOUS
Status: DISCONTINUED | OUTPATIENT
Start: 2024-05-03 | End: 2024-05-04

## 2024-05-02 RX ORDER — ATORVASTATIN CALCIUM 80 MG/1
80 TABLET, FILM COATED ORAL NIGHTLY
Status: DISCONTINUED | OUTPATIENT
Start: 2024-05-03 | End: 2024-05-06 | Stop reason: HOSPADM

## 2024-05-02 RX ORDER — CEFAZOLIN SODIUM 2 G/100ML
2 INJECTION, SOLUTION INTRAVENOUS EVERY 8 HOURS
Qty: 300 ML | Refills: 0 | Status: COMPLETED | OUTPATIENT
Start: 2024-05-02 | End: 2024-05-03

## 2024-05-02 RX ORDER — HEPARIN SODIUM 1000 [USP'U]/ML
INJECTION, SOLUTION INTRAVENOUS; SUBCUTANEOUS AS NEEDED
Status: DISCONTINUED | OUTPATIENT
Start: 2024-05-02 | End: 2024-05-02

## 2024-05-02 RX ORDER — OXYCODONE HYDROCHLORIDE 5 MG/1
10 TABLET ORAL EVERY 4 HOURS PRN
Status: DISCONTINUED | OUTPATIENT
Start: 2024-05-02 | End: 2024-05-06 | Stop reason: HOSPADM

## 2024-05-02 RX ORDER — ONDANSETRON HYDROCHLORIDE 2 MG/ML
INJECTION, SOLUTION INTRAVENOUS AS NEEDED
Status: DISCONTINUED | OUTPATIENT
Start: 2024-05-02 | End: 2024-05-02

## 2024-05-02 RX ORDER — MUPIROCIN 20 MG/G
1 OINTMENT TOPICAL 2 TIMES DAILY
Status: DISCONTINUED | OUTPATIENT
Start: 2024-05-02 | End: 2024-05-06 | Stop reason: HOSPADM

## 2024-05-02 RX ORDER — OXYCODONE HYDROCHLORIDE 5 MG/1
5 TABLET ORAL EVERY 4 HOURS PRN
Status: DISCONTINUED | OUTPATIENT
Start: 2024-05-02 | End: 2024-05-06 | Stop reason: HOSPADM

## 2024-05-02 RX ORDER — HYDROMORPHONE HYDROCHLORIDE 1 MG/ML
INJECTION, SOLUTION INTRAMUSCULAR; INTRAVENOUS; SUBCUTANEOUS
Status: COMPLETED
Start: 2024-05-02 | End: 2024-05-02

## 2024-05-02 RX ADMIN — PROTAMINE SULFATE 190 MG: 10 INJECTION, SOLUTION INTRAVENOUS at 10:27

## 2024-05-02 RX ADMIN — SODIUM CHLORIDE, POTASSIUM CHLORIDE, SODIUM LACTATE AND CALCIUM CHLORIDE 5 ML/HR: 600; 310; 30; 20 INJECTION, SOLUTION INTRAVENOUS at 12:09

## 2024-05-02 RX ADMIN — HYDROMORPHONE HYDROCHLORIDE 0.5 MG: 1 INJECTION, SOLUTION INTRAMUSCULAR; INTRAVENOUS; SUBCUTANEOUS at 16:12

## 2024-05-02 RX ADMIN — Medication 100 MCG: at 09:05

## 2024-05-02 RX ADMIN — SODIUM CHLORIDE, POTASSIUM CHLORIDE, SODIUM LACTATE AND CALCIUM CHLORIDE 1000 ML: 600; 310; 30; 20 INJECTION, SOLUTION INTRAVENOUS at 15:36

## 2024-05-02 RX ADMIN — INSULIN HUMAN 3 UNITS: 100 INJECTION, SOLUTION PARENTERAL at 09:55

## 2024-05-02 RX ADMIN — FENTANYL CITRATE 50 MCG: 50 INJECTION, SOLUTION INTRAMUSCULAR; INTRAVENOUS at 11:00

## 2024-05-02 RX ADMIN — PROTAMINE SULFATE 10 MG: 10 INJECTION, SOLUTION INTRAVENOUS at 10:23

## 2024-05-02 RX ADMIN — DEXMEDETOMIDINE HYDROCHLORIDE 0.2 MCG/KG/HR: 400 INJECTION INTRAVENOUS at 13:23

## 2024-05-02 RX ADMIN — HYDROMORPHONE HYDROCHLORIDE 1 MG: 1 INJECTION, SOLUTION INTRAMUSCULAR; INTRAVENOUS; SUBCUTANEOUS at 12:30

## 2024-05-02 RX ADMIN — SODIUM CHLORIDE, POTASSIUM CHLORIDE, SODIUM LACTATE AND CALCIUM CHLORIDE 5 ML/HR: 600; 310; 30; 20 INJECTION, SOLUTION INTRAVENOUS at 14:14

## 2024-05-02 RX ADMIN — ONDANSETRON 4 MG: 2 INJECTION INTRAMUSCULAR; INTRAVENOUS at 10:50

## 2024-05-02 RX ADMIN — PROPOFOL 200 MG: 10 INJECTION, EMULSION INTRAVENOUS at 08:25

## 2024-05-02 RX ADMIN — ONDANSETRON 4 MG: 2 INJECTION INTRAMUSCULAR; INTRAVENOUS at 13:23

## 2024-05-02 RX ADMIN — SODIUM CHLORIDE, POTASSIUM CHLORIDE, SODIUM LACTATE AND CALCIUM CHLORIDE 500 ML: 600; 310; 30; 20 INJECTION, SOLUTION INTRAVENOUS at 19:41

## 2024-05-02 RX ADMIN — CEFAZOLIN SODIUM 2 G: 2 INJECTION, SOLUTION INTRAVENOUS at 19:55

## 2024-05-02 RX ADMIN — HYDROMORPHONE HYDROCHLORIDE 0.5 MG: 1 INJECTION, SOLUTION INTRAMUSCULAR; INTRAVENOUS; SUBCUTANEOUS at 11:45

## 2024-05-02 RX ADMIN — KETOROLAC TROMETHAMINE 15 MG: 30 INJECTION, SOLUTION INTRAMUSCULAR at 20:02

## 2024-05-02 RX ADMIN — MIDAZOLAM HYDROCHLORIDE 2 MG: 1 INJECTION, SOLUTION INTRAMUSCULAR; INTRAVENOUS at 08:15

## 2024-05-02 RX ADMIN — MIDAZOLAM HYDROCHLORIDE 1 MG: 1 INJECTION, SOLUTION INTRAMUSCULAR; INTRAVENOUS at 08:24

## 2024-05-02 RX ADMIN — FENTANYL CITRATE 50 MCG: 50 INJECTION, SOLUTION INTRAMUSCULAR; INTRAVENOUS at 08:24

## 2024-05-02 RX ADMIN — MIDAZOLAM HYDROCHLORIDE 2 MG: 1 INJECTION, SOLUTION INTRAMUSCULAR; INTRAVENOUS at 09:13

## 2024-05-02 RX ADMIN — HEPARIN SODIUM 20000 UNITS: 1000 INJECTION INTRAVENOUS; SUBCUTANEOUS at 09:44

## 2024-05-02 RX ADMIN — Medication 0.05 MCG/KG/MIN: at 09:51

## 2024-05-02 RX ADMIN — ACETAMINOPHEN 650 MG: 325 TABLET ORAL at 23:07

## 2024-05-02 RX ADMIN — CHLORHEXIDINE GLUCONATE 0.12% ORAL RINSE 15 ML: 1.2 LIQUID ORAL at 05:49

## 2024-05-02 RX ADMIN — INSULIN LISPRO 2 UNITS: 100 INJECTION, SOLUTION INTRAVENOUS; SUBCUTANEOUS at 12:19

## 2024-05-02 RX ADMIN — SODIUM CHLORIDE, POTASSIUM CHLORIDE, SODIUM LACTATE AND CALCIUM CHLORIDE 500 ML: 600; 310; 30; 20 INJECTION, SOLUTION INTRAVENOUS at 13:28

## 2024-05-02 RX ADMIN — SENNOSIDES AND DOCUSATE SODIUM 2 TABLET: 8.6; 5 TABLET ORAL at 20:02

## 2024-05-02 RX ADMIN — ROCURONIUM BROMIDE 70 MG: 10 INJECTION, SOLUTION INTRAVENOUS at 08:26

## 2024-05-02 RX ADMIN — PHENYLEPHRINE HYDROCHLORIDE 0.2 MCG/KG/MIN: 10 INJECTION INTRAVENOUS at 08:24

## 2024-05-02 RX ADMIN — INSULIN LISPRO 5 UNITS: 100 INJECTION, SOLUTION INTRAVENOUS; SUBCUTANEOUS at 20:03

## 2024-05-02 RX ADMIN — FENTANYL CITRATE 50 MCG: 50 INJECTION, SOLUTION INTRAMUSCULAR; INTRAVENOUS at 11:16

## 2024-05-02 RX ADMIN — SUGAMMADEX 200 MG: 100 INJECTION, SOLUTION INTRAVENOUS at 10:50

## 2024-05-02 RX ADMIN — OXYCODONE HYDROCHLORIDE 10 MG: 5 TABLET ORAL at 16:13

## 2024-05-02 RX ADMIN — ROCURONIUM BROMIDE 70 MG: 10 INJECTION, SOLUTION INTRAVENOUS at 09:29

## 2024-05-02 RX ADMIN — ROCURONIUM BROMIDE 50 MG: 10 INJECTION, SOLUTION INTRAVENOUS at 09:01

## 2024-05-02 RX ADMIN — FENTANYL CITRATE 200 MCG: 50 INJECTION, SOLUTION INTRAMUSCULAR; INTRAVENOUS at 09:10

## 2024-05-02 RX ADMIN — MUPIROCIN 1 APPLICATION: 20 OINTMENT TOPICAL at 20:02

## 2024-05-02 RX ADMIN — SODIUM CHLORIDE, POTASSIUM CHLORIDE, SODIUM LACTATE AND CALCIUM CHLORIDE 5 ML/HR: 600; 310; 30; 20 INJECTION, SOLUTION INTRAVENOUS at 15:36

## 2024-05-02 RX ADMIN — DEXAMETHASONE SODIUM PHOSPHATE: 4 INJECTION, SOLUTION INTRA-ARTICULAR; INTRALESIONAL; INTRAMUSCULAR; INTRAVENOUS; SOFT TISSUE at 12:45

## 2024-05-02 RX ADMIN — HYDROMORPHONE HYDROCHLORIDE 0.5 MG: 1 INJECTION, SOLUTION INTRAMUSCULAR; INTRAVENOUS; SUBCUTANEOUS at 11:55

## 2024-05-02 RX ADMIN — CEFAZOLIN 2 G: 330 INJECTION, POWDER, FOR SOLUTION INTRAMUSCULAR; INTRAVENOUS at 09:02

## 2024-05-02 RX ADMIN — KETOROLAC TROMETHAMINE 30 MG: 30 INJECTION, SOLUTION INTRAMUSCULAR at 10:56

## 2024-05-02 RX ADMIN — KETOROLAC TROMETHAMINE 15 MG: 30 INJECTION, SOLUTION INTRAMUSCULAR at 11:50

## 2024-05-02 RX ADMIN — SODIUM CHLORIDE, SODIUM GLUCONATE, SODIUM ACETATE, POTASSIUM CHLORIDE AND MAGNESIUM CHLORIDE: 526; 502; 368; 37; 30 INJECTION, SOLUTION INTRAVENOUS at 08:00

## 2024-05-02 RX ADMIN — LIDOCAINE 4% 1 PATCH: 40 PATCH TOPICAL at 12:17

## 2024-05-02 RX ADMIN — INSULIN LISPRO 5 UNITS: 100 INJECTION, SOLUTION INTRAVENOUS; SUBCUTANEOUS at 23:08

## 2024-05-02 RX ADMIN — INSULIN LISPRO 10 UNITS: 100 INJECTION, SOLUTION INTRAVENOUS; SUBCUTANEOUS at 17:05

## 2024-05-02 RX ADMIN — LIDOCAINE HYDROCHLORIDE 100 MG: 20 INJECTION, SOLUTION INFILTRATION; PERINEURAL at 08:25

## 2024-05-02 RX ADMIN — POTASSIUM CHLORIDE 20 MEQ: 14.9 INJECTION, SOLUTION INTRAVENOUS at 16:14

## 2024-05-02 RX ADMIN — DEXAMETHASONE SODIUM PHOSPHATE 4 MG: 4 INJECTION, SOLUTION INTRAMUSCULAR; INTRAVENOUS at 10:50

## 2024-05-02 RX ADMIN — ONDANSETRON 4 MG: 2 INJECTION INTRAMUSCULAR; INTRAVENOUS at 20:52

## 2024-05-02 SDOH — HEALTH STABILITY: MENTAL HEALTH: CURRENT SMOKER: 0

## 2024-05-02 ASSESSMENT — PAIN - FUNCTIONAL ASSESSMENT: PAIN_FUNCTIONAL_ASSESSMENT: 0-10

## 2024-05-02 ASSESSMENT — PAIN SCALES - GENERAL
PAINLEVEL_OUTOF10: 10 - WORST POSSIBLE PAIN
PAINLEVEL_OUTOF10: 0 - NO PAIN
PAINLEVEL_OUTOF10: 6
PAINLEVEL_OUTOF10: 7
PAINLEVEL_OUTOF10: 7
PAINLEVEL_OUTOF10: 10 - WORST POSSIBLE PAIN

## 2024-05-02 ASSESSMENT — PAIN DESCRIPTION - LOCATION: LOCATION: CHEST

## 2024-05-02 ASSESSMENT — COLUMBIA-SUICIDE SEVERITY RATING SCALE - C-SSRS
6. HAVE YOU EVER DONE ANYTHING, STARTED TO DO ANYTHING, OR PREPARED TO DO ANYTHING TO END YOUR LIFE?: NO
1. IN THE PAST MONTH, HAVE YOU WISHED YOU WERE DEAD OR WISHED YOU COULD GO TO SLEEP AND NOT WAKE UP?: NO
2. HAVE YOU ACTUALLY HAD ANY THOUGHTS OF KILLING YOURSELF?: NO

## 2024-05-02 NOTE — CARE PLAN
Problem: Fall/Injury  Goal: Not fall by end of shift  Outcome: Progressing  Goal: Be free from injury by end of the shift  Outcome: Progressing  Goal: Verbalize understanding of personal risk factors for fall in the hospital  Outcome: Progressing  Goal: Verbalize understanding of risk factor reduction measures to prevent injury from fall in the home  Outcome: Progressing  Goal: Use assistive devices by end of the shift  Outcome: Progressing  Goal: Pace activities to prevent fatigue by end of the shift  Outcome: Progressing   The patient's goals for the shift include      The clinical goals for the shift include pt safety will be maintained in duration of the shift.    Over the shift, the patient did make progress toward the following goals.

## 2024-05-02 NOTE — OP NOTE
CABG, 1 VESSEL MIDCAB (L) Operative Note     Date: 2024 - 2024  OR Location: [facility] A OR    Name: Edward D Hume, : 1950, Age: 73 y.o., MRN: [id number], Sex: male    Diagnosis  Pre-op Diagnosis     * Unstable angina (Multi) [I20.0] Post-op Diagnosis     * Unstable angina (Multi) [I20.0]     Procedures  CABG, 1 VESSEL MIDCAB  52880 - [place] CABG W/ARTERIAL GRAFT SINGLE ARTERIAL GRAFT    Mini invasive pedicled CAMEJO to Lad   Surgeons      * Rudolph Price - Primary    Resident/Fellow/Other Assistant:  Surgeons and Role:  * No surgeons found with a matching role *  Danielle Gomez MD. No resident available for this surgery  Gavino Philipp  Procedure Summary  Anesthesia: General  ASA: III  Anesthesia Staff: Anesthesiologist: Nicholas Hays MD  C-AA: PAUL Rodriguez; PAUL James  Perfusionist: Jamie Goodson  Estimated Blood Loss: 100mL  Intra-op Medications:   Administrations occurring from 0730 to 1300 on 24:   Medication Name Total Dose   papaverine injection 60 mg   aspirin EC tablet 81 mg Cannot be calculated   atenolol (Tenormin) tablet 50 mg Cannot be calculated   insulin lispro (HumaLOG) injection 0-5 Units Cannot be calculated   mupirocin (Bactroban) 2 % ointment 0.5 Application Cannot be calculated   phenylephrine (Flavio-Synephrine) 10 mg in sodium chloride 0.9% 250 mL (0.04 mg/mL) infusion (premix) 0.15 mg              Anesthesia Record               Intraprocedure I/O Totals          Intake    Norepinephrine Drip 0.00 mL    The total shown is the total volume documented since Anesthesia Start was filed.    Insulin Drip 0.00 mL    The total shown is the total volume documented since Anesthesia Start was filed.    Phenylephrine Drip 0.00 mL    The total shown is the total volume documented since Anesthesia Start was filed.    Total Intake 0 mL          Specimen: No specimens collected     Staff:   Circulator: Yesenia Huang RN  Scrub Person: Regla Colon RN; Maximilian  CORINA Nails RN; Candi Schaeffer, NICOHLE         Drains and/or Catheters:   Urethral Catheter Temperature probe;Straight-tip 16 Fr. (Active)       Tourniquet Times:         Implants:     Findings: 1.There were no pericardial adhesions or effusions.   2. The ascending aorta was soft without evidence of atherosclerosis.   3. The heart was NSR and demonstrated normal ventricular function. Small PFO.   4.The patient had severe diffuse coronary artery disease, however we were able to find locations on the lad that was suitable for grafting.  5.  The conduits were suitable for grafting.  6.  The flow of the graft after the protamine were acceptable.     Indications: Edward D Hume is an 73 y.o. male who is having surgery for Unstable angina (Multi) [I20.0].   Doble vessel disease with critical LAD and moderate OM with CP at Rest  Mr. Edward D Hume Is considered for a MIDCAB, instead of a traditional sternotomy, based on the following reasons: Planned hybrid approach with LIMA to LAD followed by PCI. (Cardiologist: Gabrielle)     The patient was seen in the preoperative area. The risks, benefits, complications, treatment options, non-operative alternatives, expected recovery and outcomes were discussed with the patient. The possibilities of reaction to medication, pulmonary aspiration, injury to surrounding structures, bleeding, recurrent infection, the need for additional procedures, failure to diagnose a condition, and creating a complication requiring transfusion or operation were discussed with the patient. The patient concurred with the proposed plan, giving informed consent.  The site of surgery was properly noted/marked if necessary per policy. The patient has been actively warmed in preoperative area. Preoperative antibiotics have been ordered and given within 1 hours of incision. Venous thrombosis prophylaxis have been ordered including chemical prophylaxis    Procedure Details:   After informed consent, and positive  identification, the patient was brought to the operative theatre. They were placed on the operating room table in the supine position and an arterial monitoring line was placed. They subsequently  underwent induction of anesthesia, and tish-tracheal intubation.  A STARLA probe was placed in the esophagus, and central intravenous access obtained. They were then prepped and draped in a sterile surgical fashion.    A surgical time-out was performed with the correct patient, correct procedure, laterality, timing and dosage of antibiotics, availability of blood, administration of beta blocker, fire risk, and sterility of  instruments were all verified, before beginning the case.    The patient was taken to the operating room by anesthesia, placed supine on the operating table, intubated  with a double lumen tube and placed under general anesthesia.  A central line and wendi were placed. STARLA was performed which confirmed the preoperative findings of reduced EF without significant valvular disease.  The patient was positioned with a bump under the left side and was prepped and draped in the usual sterile fashion.  A time out was performed. The patient was placed on single right lung ventilation. A 5cm anterior thoracotomy was made in the 4th intercostal space.  The thoratrack retractor was placed and the mammary was harvested as a small pedicle.  Heparin was administered and the mammary was divided distally.  The long thoratrack blade was exchanged for a standard blade, a wound protector was placed and the retractor placed back in the 4th intercostal space.  The pericardium was opened and the LAD identified.  The stabilizer device was inserted from a sub-xyphoid position and placed around the LAD. The mammary was prepared.  The LAD was opened and a 2.5 mm shunt was placed.  The LIMA was anastomosed using a running 7-0 prolene suture. We used micro Kor nott to tied down the 7-0 Prolene. The bulldog was removed from the mammary  and flows were assess with the flow probe.  There was good flow with a low PI.  The stabilizer was removed and the flows were confirmed and noted to still be appropriate.  Protamine was administered.  Hemostasis was meticulously secured. The protamine was given an chest tube was placed through the track where the stabilizer device had been inserted.  The ribs were reapproximated with #5 vicryl sutures.  The fascia was closed with running 0-vicryl suture.  The subcutaneous tissue was closed in layers with the skin closed with a running 4-0 vicryl suture.  A dry sterile dressing was applied.  All instrument, needle and sponge counts were correct at the end of the case.  The patient was extubated and transferred to the ICU in stable condition.    Complications:  None; patient tolerated the procedure well.    Disposition: ICU - extubated and stable.  Condition: stable         Additional Details: Zack Gomez performed the thoracotomy and close it.    Attending Attestation: I was present and scrubbed for the entire procedure.    Rudolph Price  Phone Number: [phone]

## 2024-05-02 NOTE — ANESTHESIA PREPROCEDURE EVALUATION
Patient: Edward D Hume    Procedure Information       Date/Time: 05/02/24 0730    Procedure: CABG, 1 VESSEL MIDCAB (Left) - MIDCAB    Location: [facility] A OR  / Virtual [facility] A OR    Surgeons: Rudolph Price MD            Relevant Problems   Cardiac   (+) Chest pain   (+) Mixed hyperlipidemia   (+) Primary hypertension   (+) Unstable angina (Multi)      GI   (+) Gastroesophageal reflux disease without esophagitis      /Renal   (+) Benign prostatic hyperplasia without lower urinary tract symptoms      Endocrine   (+) Type 2 diabetes mellitus with stage 3a chronic kidney disease, with long-term current use of insulin (Multi)       Clinical information reviewed:   Tobacco  Allergies  Meds   Med Hx  Surg Hx   Fam Hx  Soc Hx         Past Medical History:   Diagnosis Date    Acute nonparalytic poliomyelitis ([facility])     Acute nonparalytic polio    Body mass index (BMI) 25.0-25.9, adult 10/26/2019    Body mass index (BMI) of 25.0 to 25.9 in adult    Body mass index (BMI) 27.0-27.9, adult 09/22/2021    Body mass index (BMI) of 27.0 to 27.9 in adult    Body mass index (BMI) 29.0-29.9, adult 08/31/2019    Body mass index (BMI) of 29.0 to 29.9 in adult    Crushing injury of unspecified finger(s), initial encounter 06/12/2014    Injury, crush, finger    Diabetes mellitus (Multi)     Disorder of the skin and subcutaneous tissue, unspecified 09/04/2019    Skin lesion    Disorder of the skin and subcutaneous tissue, unspecified 03/30/2016    Skin lesion    Elevated prostate specific antigen (PSA)     Elevated prostate specific antigen (PSA)    History of falling 06/27/2016    History of fall    Other chest pain 03/07/2018    Chest pressure    Other conditions influencing health status 02/22/2017    Type 2 diabetes mellitus, uncontrolled    Other conditions influencing health status 03/04/2016    Epicondylitis    Other conditions influencing health status     Nephrolithiasis    Personal history of other mental and  behavioral disorders 11/06/2018    History of depression    Personal history of other specified conditions 04/16/2019    History of dizziness    Personal history of other specified conditions 04/16/2019    History of weight loss    Personal history of other specified conditions 06/29/2016    History of abdominal pain    Puncture wound without foreign body of left hand, initial encounter 03/05/2019    Puncture wound of hand, left    Puncture wound without foreign body of unspecified hand, initial encounter 03/05/2019    Puncture wound, hand    Superficial mycosis, unspecified 12/01/2014    Fungal otitis externa    Trigger finger, left middle finger 07/24/2017    Trigger middle finger of left hand    Trigger finger, left middle finger 07/24/2017    Trigger middle finger of left hand    Trigger finger, left ring finger 07/24/2017    Trigger ring finger of left hand    Trigger finger, right index finger 07/24/2017    Trigger index finger of right hand    Trigger finger, right middle finger 11/10/2017    Trigger middle finger of right hand      Past Surgical History:   Procedure Laterality Date    HAND TENDON SURGERY  02/15/2013    Hand Incision Tendon Sheath Of A Finger    OTHER SURGICAL HISTORY  04/09/2013    Needle Biopsy Of Prostate    OTHER SURGICAL HISTORY  07/02/2020    Retinal detachment repair    OTHER SURGICAL HISTORY  12/06/2017    Upper Gastrointestinal Endoscopy, Simple Primary Exam    OTHER SURGICAL HISTORY  06/12/2013    Repair Of Retinal Detachment Procedure Detail    OTHER SURGICAL HISTORY  06/12/2013    Surg Rad Resect Tonsil/Tonsil Pillars/Retromol Tri W/O Closure    SHOULDER SURGERY  06/12/2013    Shoulder Surgery     Social History     Tobacco Use    Smoking status: Former     Types: Cigarettes    Smokeless tobacco: Never   Substance Use Topics    Alcohol use: Yes     Comment: Occasionally    Drug use: Never      Current Outpatient Medications   Medication Instructions    albuterol 90 mcg/actuation  "inhaler 2 puffs, inhalation, Every 6 hours PRN    allopurinol (ZYLOPRIM) 300 mg, oral, Daily    amLODIPine (NORVASC) 10 mg, oral, Daily    aspirin 81 mg, oral, Daily    atenolol (TENORMIN) 50 mg, oral, Daily    biotin 10 mg tablet 1 tablet, oral, Daily    blood sugar diagnostic (Accu-Chek Kamala Plus test strp) strip 1 strip, miscellaneous, 2 times daily    cranberry extract 650 mg, oral, Daily    dextrose 25 g, intravenous    famotidine (PEPCID) 20 mg, oral, Nightly    gemfibrozil (LOPID) 600 mg, oral, 2 times daily    glipiZIDE XL (GLUCOTROL XL) 20 mg, oral, Daily    ibuprofen 200 mg, oral, Every 6 hours PRN    insulin glargine (Toujeo SoloStar U-300 Insulin) 300 unit/mL (1.5 mL) injection INJECT 45 UNITS UNDER THE SKIN ONCE DAILY IN THE MORNING. 45 UNITS MAX    Januvia 100 mg, oral, Daily    ketoconazole (NIZOral) 2 % cream Topical, 2 times daily, To affected areas of ears    omeprazole (PRILOSEC) 40 mg, oral, Daily before breakfast    pen needle, diabetic (BD Ultra-Fine Short Pen Needle) 31 gauge x 5/16\" needle USE AS DIRECTED    tamsulosin (FLOMAX) 0.4 mg, oral, 2 times daily    valsartan-hydrochlorothiazide (Diovan-HCT) 160-12.5 mg tablet 1 tablet, oral, Daily      Allergies   Allergen Reactions    Amoxicillin Shortness of breath    Metformin Swelling and Angioedema    Iodinated Contrast Media Palpitations        Chemistry    Lab Results   Component Value Date/Time     05/02/2024 0515    K 3.6 05/02/2024 0515     05/02/2024 0515    CO2 22 05/02/2024 0515    BUN 32 (H) 05/02/2024 0515    CREATININE 1.04 05/02/2024 0515    Lab Results   Component Value Date/Time    CALCIUM 8.8 05/02/2024 0515    ALKPHOS 47 05/02/2024 0515    AST 18 05/02/2024 0515    ALT 12 05/02/2024 0515    BILITOT 0.3 05/02/2024 0515          Lab Results   Component Value Date    HGBA1C 5.7 (H) 02/23/2024    HGBA1C 5.8 (H) 02/23/2024     Lab Results   Component Value Date/Time    WBC 10.3 05/02/2024 0515    HGB 12.8 (L) 05/02/2024 " "0515    HCT 37.6 (L) 05/02/2024 0515     05/02/2024 0515     Lab Results   Component Value Date/Time    PROTIME 11.9 04/30/2024 1100    INR 1.1 04/30/2024 1100     No results found for: \"ABORH\"  Encounter Date: 05/01/24   Electrocardiogram, 12-lead   Result Value    Ventricular Rate 82    Atrial Rate 82    NY Interval 204    QRS Duration 108    QT Interval 396    QTC Calculation(Bazett) 462    P Axis 63    R Axis 35    T Axis 37    QRS Count 13    Q Onset 225    P Onset 123    P Offset 178    T Offset 423    QTC Fredericia 439    Narrative    Normal sinus rhythm  Cannot rule out Inferior infarct , age undetermined  Abnormal ECG  When compared with ECG of 30-APR-2024 10:33,  Premature ventricular complexes are no longer Present  ST now depressed in Lateral leads     No results found for this or any previous visit from the past 1095 days.       Visit Vitals  /79 (Patient Position: Lying)   Pulse 73   Temp 36.9 °C (98.5 °F) (Temporal)   Resp 18   Wt 96.5 kg (212 lb 11.9 oz)   SpO2 98%   BMI 30.53 kg/m²   Smoking Status Former   BSA 2.18 m²     NPO/Void Status  Date of Last Liquid: 05/01/24  Time of Last Liquid: 0000  Date of Last Solid: 05/01/24  Time of Last Solid: 0000         Physical Exam    Airway  Mallampati: II  TM distance: >3 FB  Neck ROM: full     Cardiovascular - normal exam     Dental - normal exam     Pulmonary - normal exam     Abdominal - normal exam              Anesthesia Plan    History of general anesthesia?: yes  History of complications of general anesthesia?: no    ASA 3     general     The patient is not a current smoker.    intravenous induction   Postoperative administration of opioids is intended.  Anesthetic plan and risks discussed with patient.  Use of blood products discussed with patient who.    Plan discussed with CAA.        "

## 2024-05-02 NOTE — PROGRESS NOTES
"Edward D Hume is a 73 y.o. male on day 1 of admission presenting with Unstable angina (Multi).    Subjective   Pt returned from OR       Objective     OR course:   Procedure/Surgeon: MIDCAB (CAMEJO>LAD), Ruda Price  CPB: n/a  min, XC:  n/a min  LVEF pre/postop 65%, small Lt>Rt PFO, trace AI and TR  Chest Tubes/Drains: 1 left pleural, 0 right pleural, 0 mediastinal   Temporary wires: No wires, intrinsic NSR 60s  Airway: easy mask, easy intubation    Fluids:  Crystalloid: 1000ml     Cellsaver:  n/a  Product: n/a  EBL: 250ml  UOP: 300ml    Infusions: n/a    Meds:  Versed: n/a  Fentanyl: 300mcg, last 100mcg 25min prior to arrival  Rocuronium: n/a  Abx: 2g Ancef    Physical Exam  Constitutional:       General: He is in acute distress.      Comments: Elderly male laying in ICU bed, extubated, moaning \"it hurts\" and writhing in pain.   Cardiovascular:      Rate and Rhythm: Normal rate and regular rhythm.   Pulmonary:      Effort: Pulmonary effort is normal.      Breath sounds: Normal breath sounds.      Comments: Lt pleural friction rub  Abdominal:      General: There is no distension.      Palpations: Abdomen is soft.      Tenderness: There is no abdominal tenderness.   Skin:     General: Skin is warm and dry.      Comments: Lt thoracotomy incision covered with clean dry bandage   Neurological:      General: No focal deficit present.       Last Recorded Vitals  Blood pressure 164/79, pulse 73, temperature 36.9 °C (98.5 °F), temperature source Temporal, resp. rate 18, weight 96.5 kg (212 lb 11.9 oz), SpO2 98%.  Intake/Output last 3 Shifts:  I/O last 3 completed shifts:  In: - (0 mL/kg)   Out: 355 (3.7 mL/kg) [Urine:350 (0.1 mL/kg/hr); Blood:5]  Weight: 96.5 kg     Relevant Results  Scheduled medications  acetaminophen, 650 mg, oral, q6h  aspirin, 81 mg, oral, Daily  [START ON 5/3/2024] atorvastatin, 80 mg, oral, Nightly  ceFAZolin, 2 g, intravenous, q8h  HYDROmorphone, , ,   insulin lispro, 0-15 Units, subcutaneous, " q4h  lidocaine, 1 patch, transdermal, q24h  mupirocin, 1 Application, Each Nostril, BID  [START ON 5/3/2024] pantoprazole, 40 mg, oral, Daily before breakfast   Or  [START ON 5/3/2024] pantoprazole, 40 mg, intravenous, Daily before breakfast  polyethylene glycol, 17 g, oral, Daily  sennosides-docusate sodium, 2 tablet, oral, BID      Continuous medications  EPINEPHrine, 0.01-1 mcg/kg/min  lactated Ringer's, 5 mL/hr  norepinephrine, 0.01-0.5 mcg/kg/min      PRN medications  PRN medications: alteplase, dextrose **OR** glucagon, HYDROmorphone, HYDROmorphone, HYDROmorphone, naloxone, ondansetron ODT **OR** ondansetron, oxyCODONE, oxyCODONE, oxygen    LABS:  CMP:  Results from last 7 days   Lab Units 05/02/24  0515 04/30/24  1100   SODIUM mmol/L 139 140   POTASSIUM mmol/L 3.6 4.2   CHLORIDE mmol/L 106 104   CO2 mmol/L 22 26   ANION GAP mmol/L 15 14   BUN mg/dL 32* 29*   CREATININE mg/dL 1.04 1.44*   EGFR mL/min/1.73m*2 77 52*   ALBUMIN g/dL 4.1  --    ALT U/L 12  --    AST U/L 18  --    BILIRUBIN TOTAL mg/dL 0.3  --      CBC:  Results from last 7 days   Lab Units 05/02/24  0515 04/30/24  1100   WBC AUTO x10*3/uL 10.3 5.5   HEMOGLOBIN g/dL 12.8* 13.3*   HEMATOCRIT % 37.6* 38.8*   PLATELETS AUTO x10*3/uL 182 185   MCV fL 91 91     COAG:   Results from last 7 days   Lab Units 04/30/24  1100   INR  1.1     HEME/ENDO:  Results from last 7 days   Lab Units 05/02/24  0515   TSH mIU/L 0.61      CARDIAC:                Assessment/Plan   Principal Problem:    Unstable angina (Multi)  Edward D Hume is a 73 y.o. male who presented to outpatient cardiology visit on 4/30/24 with complaints of unstable angina. PMHx CAD, CKD, HTN, HLD, IDDM2, GERD. C today demonstrated MVD. Dr. Anthony and Dr. Bhumi Price discussed and decision made to admit as inpatient and proceed with MIDCAB tomorrow. Pt admitted to [facility] SDU under cardiac surgery for planned MIDCAB tomorrow with Dr. Bhumi Price. Pt transferred to [facility] ICU on return from OR after  MIDCAB (CAMEJO>LAD) with Dr. Bhumi Price on 5/2/24.    Neuro:  # Expected acute postop pain  # Arthritis  # BPPV  - Serial neuro and pain assessments  - Scheduled Tylenol, PRN Oxy, PRN Toradol, PRN Dilaudid while CTs are in  - Lidoderm patches  - PT/OT/MITT    CV:  # Severe LAD disease, moderate OM disease s/p MIDCAB (LIMA>LAD) with planned PCI to OM  # HTN, HLD  No drips on arrival to ICU  LVEF 65%, small Lt>Rt PFO, trace AI and TR pre/post  No wires, intrinsic NSR 60s  - EKG, ABP  - Maintain MAP 65-90  - Maintain CTs and monitor quantity, quality of drainage  - Volume resuscitate as needed  - ASA/statin  - Hold BB until HDs permit  - Hold home valsartan-hydrochlorothiazide, norvasc for now    Pulm:  # Extubated in OR  - Stat postop CXR  - Wean FiO2 to SpO2 > 92%  - ABGs prn  - Bronch hygiene    GI:   # GERD  - NPO for 4hr after extubated until BSE  - Home PPI  - Bowel regimen  - Zofran PRN    /Renal:  # CKD  - RFP now  - RFP daily, replete lytes per protocol  - Cooper, strict I&Os    Heme:  # Acute blood loss anemia 2/2 CBP  - CBC now  - CBC daily  - SCDs for DVTppx  - Lovenox POD1 if appropriate    Endo:  # IDDM2  - POCT BG, ISS q4  - Maintain BG < 180  - Hold home Lantus 45u, glipizide, Januvia    ID:  # Gout  - Trend temp, WBCs  - Periop abx  - Hold home allopurinol    Dispo: admit to CTICU       I spent 90 minutes in the professional and overall critical care of this patient.      Tati Scanlon PA-C

## 2024-05-02 NOTE — ANESTHESIA PROCEDURE NOTES
Central Venous Line:    Date/Time: 5/2/2024 8:40 AM    A central venous line was placed in the OR for the following indication(s): central venous access and CVP monitoring.  Staffing  Performed: PAUL   Authorized by: Nicholas Hays MD    Performed by: PAUL Rodriguez    Sterility preparation included the following: provider hand hygiene performed prior to central venous catheter insertion, all 5 sterile barriers used (gloves, gown, cap, mask, large sterile drape) during central venous catheter insertion, antiseptic used during central venous catheter insertion and skin prep agent completely dried prior to procedure.  Medical reason for not performing maximal sterile barrier technique: no  The patient was placed in Trendelenburg position.    Right internal jugular vein was prepped.    The site was prepped with Chlorhexidine.  Size: 9 Fr (8 Fr)   Length: 11.5  Catheter type: introducer   Number of Lumens: double lumen    This catheter was not an oximetric catheter.    During the procedure, the following specific steps were taken: target vein identified, needle advanced into vein and blood aspirated and guidewire advanced into vein.  Seldinger technique used.  Procedure performed using ultrasound guidance.  Sterile gel and probe cover used in ultrasound-guided central venous catheter insertion.    Intravenous verification was obtained by ultrasound, venous blood return and manometry.      Post insertion care included: all ports aspirated, all ports flushed easily, guidewire removed intact, Biopatch applied, line sutured in place and dressing applied.    During the procedure the patient experienced: patient tolerated procedure well with no complications.           images stored in chart

## 2024-05-02 NOTE — ANESTHESIA PROCEDURE NOTES
Airway  Date/Time: 5/2/2024 8:30 AM  Urgency: elective    Airway not difficult    Staffing  Performed: PAUL   Authorized by: Nicholas Hays MD    Performed by: PAUL Rodriguez  Patient location during procedure: OR    Indications and Patient Condition  Indications for airway management: anesthesia  Spontaneous Ventilation: absent  Sedation level: deep  Preoxygenated: yes  Mask difficulty assessment: 1 - vent by mask    Final Airway Details  Final airway type: endotracheal airway      Successful airway: ETT - double lumen left  Cuffed: yes   Successful intubation technique: direct laryngoscopy  Blade: Melissa  Blade size: #4  ETT DL size (fr): 32  Cormack-Lehane Classification: grade I - full view of glottis  Placement verified by: chest auscultation, bronchoscopy and capnometry   Measured from: lips  ETT to lips (cm): 22  Number of attempts at approach: 1

## 2024-05-02 NOTE — BRIEF OP NOTE
Date: 2024 - 2024  OR Location: [facility] OR    Name: Edward D Hume, : 1950, Age: 73 y.o., MRN: [id number], Sex: male    Diagnosis  Pre-op Diagnosis     * Unstable angina (Multi) [I20.0] Post-op Diagnosis     * Unstable angina (Multi) [I20.0]     Procedures  Left mini anterior thoracotomy  Off pump CABG x 1           Insitu LIMA - LAD    Chest Tubes/Drains: 1 left pleural erin drain        Temporary Pacing Wires: n/a    -Settings:   -Underlying Rhythm:    Permanent pacer/ICD: No   -Preoperative settings:    -Intra-op/ Postoperative settings:    Arm/Leg/Groin Closure/Cutdown performed by: Maksim HAWTHORNE     Cardio Pulmonary Bypass Time: n/a  Cross-clamp Time: n/a  Circulatory Arrest: No Time:     Is patient candidate for Emergency Re-sternotomy? No   -If yes, POD #10 is -        Surgeons      * Rudolph Price - Primary    Resident/Fellow/Other Assistant:  Surgeons and Role:  * No surgeons found with a matching role *    Procedure Summary  Anesthesia: General  ASA: III  Anesthesia Staff: Anesthesiologist: Nicholas Hays MD  C-AA: PAUL Rodriguez; PAUL James  Perfusionist: Jamie Goodson  Estimated Blood Loss: 200mL  Intra-op Medications:   Administrations occurring from 0730 to 1300 on 24:   Medication Name Total Dose   papaverine injection 60 mg   aspirin EC tablet 81 mg Cannot be calculated   atenolol (Tenormin) tablet 50 mg Cannot be calculated   insulin lispro (HumaLOG) injection 0-5 Units Cannot be calculated   mupirocin (Bactroban) 2 % ointment 0.5 Application Cannot be calculated   phenylephrine (Flavio-Synephrine) 10 mg in sodium chloride 0.9% 250 mL (0.04 mg/mL) infusion (premix) 0.15 mg              Anesthesia Record               Intraprocedure I/O Totals          Intake    Norepinephrine Drip 0.00 mL    The total shown is the total volume documented since Anesthesia Start was filed.    Insulin Drip 0.00 mL    The total shown is the total volume documented since Anesthesia  Start was filed.    Phenylephrine Drip 0.00 mL    The total shown is the total volume documented since Anesthesia Start was filed.    Total Intake 0 mL          Specimen: No specimens collected     Staff:   Circulator: Yesenia Huang RN  Scrub Person: Regla Colon RN; Maximilian Nails RN; Candi Schaeffer RN          Findings: coronary artery disease    Complications:  None; patient tolerated the procedure well.     Disposition:   Condition: stable  Specimens Collected: No specimens collected  Attending Attestation:     Rudolph Price  Phone Number: [phone]

## 2024-05-02 NOTE — ANESTHESIA PROCEDURE NOTES
Arterial Line:    Date/Time: 5/2/2024 8:23 AM    Staffing  Performed: CAA   Authorized by: Nicholas aHys MD    Performed by: PAUL Rodriguez    An arterial line was placed. Procedure performed using ultrasound guidance.in the OR for the following indication(s): continuous blood pressure monitoring and blood sampling needed.    A 20 gauge (size), 1 and 3/4 inch (length), Angiocath (type) catheter was placed into the Left brachial artery, secured by Tegaderm,   Seldinger technique used.  Events:  patient tolerated procedure well with no complications.

## 2024-05-03 ENCOUNTER — APPOINTMENT (OUTPATIENT)
Dept: CARDIOLOGY | Facility: HOSPITAL | Age: 74
DRG: 234 | End: 2024-05-03
Payer: MEDICARE

## 2024-05-03 ENCOUNTER — APPOINTMENT (OUTPATIENT)
Dept: RADIOLOGY | Facility: HOSPITAL | Age: 74
DRG: 234 | End: 2024-05-03
Payer: MEDICARE

## 2024-05-03 LAB
ALBUMIN SERPL BCP-MCNC: 3.7 G/DL (ref 3.4–5)
ANION GAP SERPL CALC-SCNC: 14 MMOL/L (ref 10–20)
BUN SERPL-MCNC: 30 MG/DL (ref 6–23)
CALCIUM SERPL-MCNC: 7.7 MG/DL (ref 8.6–10.3)
CHLORIDE SERPL-SCNC: 106 MMOL/L (ref 98–107)
CO2 SERPL-SCNC: 24 MMOL/L (ref 21–32)
CREAT SERPL-MCNC: 1.13 MG/DL (ref 0.5–1.3)
EGFRCR SERPLBLD CKD-EPI 2021: 69 ML/MIN/1.73M*2
ERYTHROCYTE [DISTWIDTH] IN BLOOD BY AUTOMATED COUNT: 12.5 % (ref 11.5–14.5)
GLUCOSE BLD MANUAL STRIP-MCNC: 129 MG/DL (ref 74–99)
GLUCOSE BLD MANUAL STRIP-MCNC: 132 MG/DL (ref 74–99)
GLUCOSE BLD MANUAL STRIP-MCNC: 189 MG/DL (ref 74–99)
GLUCOSE BLD MANUAL STRIP-MCNC: 189 MG/DL (ref 74–99)
GLUCOSE BLD MANUAL STRIP-MCNC: 240 MG/DL (ref 74–99)
GLUCOSE SERPL-MCNC: 133 MG/DL (ref 74–99)
HCT VFR BLD AUTO: 33.9 % (ref 41–52)
HGB BLD-MCNC: 11.7 G/DL (ref 13.5–17.5)
MAGNESIUM SERPL-MCNC: 2 MG/DL (ref 1.6–2.4)
MCH RBC QN AUTO: 31.5 PG (ref 26–34)
MCHC RBC AUTO-ENTMCNC: 34.5 G/DL (ref 32–36)
MCV RBC AUTO: 91 FL (ref 80–100)
NRBC BLD-RTO: 0 /100 WBCS (ref 0–0)
PHOSPHATE SERPL-MCNC: 3.5 MG/DL (ref 2.5–4.9)
PLATELET # BLD AUTO: 164 X10*3/UL (ref 150–450)
POTASSIUM SERPL-SCNC: 3.8 MMOL/L (ref 3.5–5.3)
RBC # BLD AUTO: 3.72 X10*6/UL (ref 4.5–5.9)
SODIUM SERPL-SCNC: 140 MMOL/L (ref 136–145)
STAPHYLOCOCCUS SPEC CULT: NORMAL
WBC # BLD AUTO: 9.9 X10*3/UL (ref 4.4–11.3)

## 2024-05-03 PROCEDURE — 2500000004 HC RX 250 GENERAL PHARMACY W/ HCPCS (ALT 636 FOR OP/ED): Mod: JZ | Performed by: STUDENT IN AN ORGANIZED HEALTH CARE EDUCATION/TRAINING PROGRAM

## 2024-05-03 PROCEDURE — 2500000001 HC RX 250 WO HCPCS SELF ADMINISTERED DRUGS (ALT 637 FOR MEDICARE OP): Performed by: STUDENT IN AN ORGANIZED HEALTH CARE EDUCATION/TRAINING PROGRAM

## 2024-05-03 PROCEDURE — 85027 COMPLETE CBC AUTOMATED: CPT | Performed by: STUDENT IN AN ORGANIZED HEALTH CARE EDUCATION/TRAINING PROGRAM

## 2024-05-03 PROCEDURE — 2060000001 HC INTERMEDIATE ICU ROOM DAILY

## 2024-05-03 PROCEDURE — 93005 ELECTROCARDIOGRAM TRACING: CPT

## 2024-05-03 PROCEDURE — 2500000001 HC RX 250 WO HCPCS SELF ADMINISTERED DRUGS (ALT 637 FOR MEDICARE OP)

## 2024-05-03 PROCEDURE — 71045 X-RAY EXAM CHEST 1 VIEW: CPT

## 2024-05-03 PROCEDURE — 37799 UNLISTED PX VASCULAR SURGERY: CPT | Performed by: STUDENT IN AN ORGANIZED HEALTH CARE EDUCATION/TRAINING PROGRAM

## 2024-05-03 PROCEDURE — 83735 ASSAY OF MAGNESIUM: CPT | Performed by: STUDENT IN AN ORGANIZED HEALTH CARE EDUCATION/TRAINING PROGRAM

## 2024-05-03 PROCEDURE — 82947 ASSAY GLUCOSE BLOOD QUANT: CPT

## 2024-05-03 PROCEDURE — 2500000006 HC RX 250 W HCPCS SELF ADMINISTERED DRUGS (ALT 637 FOR ALL PAYERS): Mod: MUE | Performed by: STUDENT IN AN ORGANIZED HEALTH CARE EDUCATION/TRAINING PROGRAM

## 2024-05-03 PROCEDURE — 94660 CPAP INITIATION&MGMT: CPT

## 2024-05-03 PROCEDURE — 97530 THERAPEUTIC ACTIVITIES: CPT | Mod: GO

## 2024-05-03 PROCEDURE — C9113 INJ PANTOPRAZOLE SODIUM, VIA: HCPCS | Performed by: STUDENT IN AN ORGANIZED HEALTH CARE EDUCATION/TRAINING PROGRAM

## 2024-05-03 PROCEDURE — 84520 ASSAY OF UREA NITROGEN: CPT | Mod: 91 | Performed by: STUDENT IN AN ORGANIZED HEALTH CARE EDUCATION/TRAINING PROGRAM

## 2024-05-03 PROCEDURE — 97161 PT EVAL LOW COMPLEX 20 MIN: CPT | Mod: GP

## 2024-05-03 PROCEDURE — 9420000001 HC RT PATIENT EDUCATION 5 MIN

## 2024-05-03 PROCEDURE — 97165 OT EVAL LOW COMPLEX 30 MIN: CPT | Mod: GO

## 2024-05-03 PROCEDURE — 94668 MNPJ CHEST WALL SBSQ: CPT

## 2024-05-03 PROCEDURE — 2500000005 HC RX 250 GENERAL PHARMACY W/O HCPCS: Performed by: STUDENT IN AN ORGANIZED HEALTH CARE EDUCATION/TRAINING PROGRAM

## 2024-05-03 PROCEDURE — 82947 ASSAY GLUCOSE BLOOD QUANT: CPT | Mod: 91

## 2024-05-03 PROCEDURE — 2500000002 HC RX 250 W HCPCS SELF ADMINISTERED DRUGS (ALT 637 FOR MEDICARE OP, ALT 636 FOR OP/ED): Performed by: STUDENT IN AN ORGANIZED HEALTH CARE EDUCATION/TRAINING PROGRAM

## 2024-05-03 PROCEDURE — 71045 X-RAY EXAM CHEST 1 VIEW: CPT | Performed by: RADIOLOGY

## 2024-05-03 RX ORDER — INSULIN GLARGINE 100 [IU]/ML
15 INJECTION, SOLUTION SUBCUTANEOUS NIGHTLY
Status: DISCONTINUED | OUTPATIENT
Start: 2024-05-03 | End: 2024-05-04

## 2024-05-03 RX ORDER — KETOROLAC TROMETHAMINE 30 MG/ML
15 INJECTION, SOLUTION INTRAMUSCULAR; INTRAVENOUS EVERY 6 HOURS PRN
Status: DISCONTINUED | OUTPATIENT
Start: 2024-05-03 | End: 2024-05-04

## 2024-05-03 RX ORDER — DEXTROSE 50 % IN WATER (D50W) INTRAVENOUS SYRINGE
12.5
Status: DISCONTINUED | OUTPATIENT
Start: 2024-05-03 | End: 2024-05-06 | Stop reason: HOSPADM

## 2024-05-03 RX ORDER — ENOXAPARIN SODIUM 100 MG/ML
40 INJECTION SUBCUTANEOUS EVERY 24 HOURS
Status: DISCONTINUED | OUTPATIENT
Start: 2024-05-03 | End: 2024-05-06 | Stop reason: HOSPADM

## 2024-05-03 RX ORDER — TAMSULOSIN HYDROCHLORIDE 0.4 MG/1
0.4 CAPSULE ORAL 2 TIMES DAILY
Status: DISCONTINUED | OUTPATIENT
Start: 2024-05-03 | End: 2024-05-06 | Stop reason: HOSPADM

## 2024-05-03 RX ORDER — INSULIN LISPRO 100 [IU]/ML
0-15 INJECTION, SOLUTION INTRAVENOUS; SUBCUTANEOUS
Status: DISCONTINUED | OUTPATIENT
Start: 2024-05-03 | End: 2024-05-06 | Stop reason: HOSPADM

## 2024-05-03 RX ORDER — ALLOPURINOL 300 MG/1
300 TABLET ORAL DAILY
Status: DISCONTINUED | OUTPATIENT
Start: 2024-05-03 | End: 2024-05-06 | Stop reason: HOSPADM

## 2024-05-03 RX ORDER — DEXTROSE 50 % IN WATER (D50W) INTRAVENOUS SYRINGE
25
Status: DISCONTINUED | OUTPATIENT
Start: 2024-05-03 | End: 2024-05-03 | Stop reason: SDUPTHER

## 2024-05-03 RX ORDER — METOPROLOL TARTRATE 25 MG/1
25 TABLET, FILM COATED ORAL 2 TIMES DAILY
Status: DISCONTINUED | OUTPATIENT
Start: 2024-05-03 | End: 2024-05-06 | Stop reason: HOSPADM

## 2024-05-03 RX ORDER — AMLODIPINE BESYLATE 10 MG/1
10 TABLET ORAL DAILY
Status: DISCONTINUED | OUTPATIENT
Start: 2024-05-03 | End: 2024-05-06 | Stop reason: HOSPADM

## 2024-05-03 RX ADMIN — ENOXAPARIN SODIUM 40 MG: 40 INJECTION SUBCUTANEOUS at 15:06

## 2024-05-03 RX ADMIN — OXYCODONE HYDROCHLORIDE 10 MG: 5 TABLET ORAL at 08:49

## 2024-05-03 RX ADMIN — METOPROLOL TARTRATE 25 MG: 25 TABLET, FILM COATED ORAL at 04:57

## 2024-05-03 RX ADMIN — ACETAMINOPHEN 650 MG: 325 TABLET ORAL at 22:55

## 2024-05-03 RX ADMIN — OXYCODONE HYDROCHLORIDE 10 MG: 5 TABLET ORAL at 15:59

## 2024-05-03 RX ADMIN — CEFAZOLIN SODIUM 2 G: 2 INJECTION, SOLUTION INTRAVENOUS at 10:53

## 2024-05-03 RX ADMIN — CEFAZOLIN SODIUM 2 G: 2 INJECTION, SOLUTION INTRAVENOUS at 02:59

## 2024-05-03 RX ADMIN — KETOROLAC TROMETHAMINE 15 MG: 30 INJECTION, SOLUTION INTRAMUSCULAR at 03:28

## 2024-05-03 RX ADMIN — POLYETHYLENE GLYCOL 3350 17 G: 17 POWDER, FOR SOLUTION ORAL at 08:49

## 2024-05-03 RX ADMIN — INSULIN GLARGINE 15 UNITS: 100 INJECTION, SOLUTION SUBCUTANEOUS at 21:23

## 2024-05-03 RX ADMIN — MUPIROCIN 1 APPLICATION: 20 OINTMENT TOPICAL at 08:49

## 2024-05-03 RX ADMIN — INSULIN LISPRO 10 UNITS: 100 INJECTION, SOLUTION INTRAVENOUS; SUBCUTANEOUS at 16:53

## 2024-05-03 RX ADMIN — PANTOPRAZOLE SODIUM 40 MG: 40 INJECTION, POWDER, FOR SOLUTION INTRAVENOUS at 06:17

## 2024-05-03 RX ADMIN — TAMSULOSIN HYDROCHLORIDE 0.4 MG: 0.4 CAPSULE ORAL at 21:20

## 2024-05-03 RX ADMIN — METOPROLOL TARTRATE 25 MG: 25 TABLET, FILM COATED ORAL at 21:20

## 2024-05-03 RX ADMIN — ACETAMINOPHEN 650 MG: 325 TABLET ORAL at 04:57

## 2024-05-03 RX ADMIN — SENNOSIDES AND DOCUSATE SODIUM 2 TABLET: 8.6; 5 TABLET ORAL at 21:20

## 2024-05-03 RX ADMIN — KETOROLAC TROMETHAMINE 15 MG: 30 INJECTION, SOLUTION INTRAMUSCULAR at 08:49

## 2024-05-03 RX ADMIN — LIDOCAINE 4% 1 PATCH: 40 PATCH TOPICAL at 12:04

## 2024-05-03 RX ADMIN — OXYCODONE HYDROCHLORIDE 10 MG: 5 TABLET ORAL at 21:20

## 2024-05-03 RX ADMIN — INSULIN LISPRO 10 UNITS: 100 INJECTION, SOLUTION INTRAVENOUS; SUBCUTANEOUS at 12:04

## 2024-05-03 RX ADMIN — AMLODIPINE BESYLATE 10 MG: 10 TABLET ORAL at 04:57

## 2024-05-03 RX ADMIN — ASPIRIN 81 MG 81 MG: 81 TABLET ORAL at 08:49

## 2024-05-03 RX ADMIN — SENNOSIDES AND DOCUSATE SODIUM 2 TABLET: 8.6; 5 TABLET ORAL at 08:49

## 2024-05-03 RX ADMIN — MUPIROCIN 1 APPLICATION: 20 OINTMENT TOPICAL at 21:20

## 2024-05-03 RX ADMIN — ALLOPURINOL 300 MG: 300 TABLET ORAL at 15:06

## 2024-05-03 RX ADMIN — ACETAMINOPHEN 650 MG: 325 TABLET ORAL at 16:52

## 2024-05-03 RX ADMIN — ACETAMINOPHEN 650 MG: 325 TABLET ORAL at 10:53

## 2024-05-03 RX ADMIN — TAMSULOSIN HYDROCHLORIDE 0.4 MG: 0.4 CAPSULE ORAL at 15:06

## 2024-05-03 RX ADMIN — ATORVASTATIN CALCIUM 80 MG: 80 TABLET, FILM COATED ORAL at 21:20

## 2024-05-03 ASSESSMENT — COGNITIVE AND FUNCTIONAL STATUS - GENERAL
MOVING FROM LYING ON BACK TO SITTING ON SIDE OF FLAT BED WITH BEDRAILS: A LITTLE
CLIMB 3 TO 5 STEPS WITH RAILING: A LITTLE
TURNING FROM BACK TO SIDE WHILE IN FLAT BAD: A LITTLE
MOVING TO AND FROM BED TO CHAIR: A LITTLE
HELP NEEDED FOR BATHING: A LITTLE
DAILY ACTIVITIY SCORE: 23
STANDING UP FROM CHAIR USING ARMS: A LITTLE
WALKING IN HOSPITAL ROOM: A LITTLE
MOBILITY SCORE: 18

## 2024-05-03 ASSESSMENT — PAIN - FUNCTIONAL ASSESSMENT
PAIN_FUNCTIONAL_ASSESSMENT: 0-10

## 2024-05-03 ASSESSMENT — PAIN SCALES - GENERAL
PAINLEVEL_OUTOF10: 8
PAINLEVEL_OUTOF10: 1
PAINLEVEL_OUTOF10: 10 - WORST POSSIBLE PAIN
PAINLEVEL_OUTOF10: 7
PAINLEVEL_OUTOF10: 2
PAINLEVEL_OUTOF10: 5 - MODERATE PAIN
PAINLEVEL_OUTOF10: 6

## 2024-05-03 ASSESSMENT — ACTIVITIES OF DAILY LIVING (ADL)
ADL_ASSISTANCE: INDEPENDENT
ADL_ASSISTANCE: INDEPENDENT

## 2024-05-03 NOTE — PROGRESS NOTES
"Edward D Hume is a 73 y.o. male on day 2 of admission presenting with Unstable angina (Multi).    Subjective   KRISTYN  Remained off Precedex overnight       Objective     Physical Exam  Constitutional:       General: He is not in acute distress.     Appearance: Normal appearance. He is not ill-appearing.      Comments: Pleasant elderly gentleman sitting in cardiac chair, conversant   Cardiovascular:      Rate and Rhythm: Normal rate and regular rhythm.   Pulmonary:      Effort: Pulmonary effort is normal.      Breath sounds: Normal breath sounds.      Comments: Lt pleural rub  Abdominal:      General: There is no distension.      Palpations: Abdomen is soft.      Tenderness: There is no abdominal tenderness.   Skin:     General: Skin is warm and dry.      Comments: Well approx Lt thoracotomy incision mildly tender no erythema edema or purulence   Neurological:      General: No focal deficit present.      Mental Status: He is alert and oriented to person, place, and time.   Psychiatric:         Mood and Affect: Mood normal.         Behavior: Behavior normal.       Last Recorded Vitals  Blood pressure 136/57, pulse 78, temperature 36.7 °C (98 °F), resp. rate 24, height 1.778 m (5' 10\"), weight 96.2 kg (212 lb 1.3 oz), SpO2 94%.  Intake/Output last 3 Shifts:  I/O last 3 completed shifts:  In: 731.4 (7.6 mL/kg) [I.V.:31.4 (0.3 mL/kg); IV Piggyback:700]  Out: 2365 (24.6 mL/kg) [Urine:1865 (0.5 mL/kg/hr); Blood:250; Chest Tube:250]  Weight: 96.2 kg     Relevant Results  Scheduled medications  acetaminophen, 650 mg, oral, q6h  amLODIPine, 10 mg, oral, Daily  aspirin, 81 mg, oral, Daily  atorvastatin, 80 mg, oral, Nightly  insulin lispro, 0-15 Units, subcutaneous, q4h  lidocaine, 1 patch, transdermal, q24h  metoprolol tartrate, 25 mg, oral, BID  mupirocin, 1 Application, Each Nostril, BID  pantoprazole, 40 mg, oral, Daily before breakfast   Or  pantoprazole, 40 mg, intravenous, Daily before breakfast  polyethylene glycol, 17 " g, oral, Daily  sennosides-docusate sodium, 2 tablet, oral, BID      Continuous medications  dexmedeTOMIDine, 0.1-1.5 mcg/kg/hr, Last Rate: Stopped (05/02/24 1739)  lactated Ringer's, 5 mL/hr, Last Rate: 5 mL/hr (05/02/24 1826)      PRN medications  PRN medications: alteplase, dextrose **OR** glucagon, HYDROmorphone, HYDROmorphone, ketorolac, naloxone, ondansetron ODT **OR** ondansetron, oxyCODONE, oxyCODONE, oxygen    LABS:  CMP:  Results from last 7 days   Lab Units 05/03/24 0448 05/02/24  1150 05/02/24  0515 04/30/24  1100   SODIUM mmol/L 140 139 139 140   POTASSIUM mmol/L 3.8 3.7 3.6 4.2   CHLORIDE mmol/L 106 105 106 104   CO2 mmol/L 24 24 22 26   ANION GAP mmol/L 14 14 15 14   BUN mg/dL 30* 32* 32* 29*   CREATININE mg/dL 1.13 1.25 1.04 1.44*   EGFR mL/min/1.73m*2 69 62 77 52*   MAGNESIUM mg/dL 2.00 2.00  --   --    ALBUMIN g/dL 3.7 3.9 4.1  --    ALT U/L  --   --  12  --    AST U/L  --   --  18  --    BILIRUBIN TOTAL mg/dL  --   --  0.3  --      CBC:  Results from last 7 days   Lab Units 05/03/24 0448 05/02/24  1150 05/02/24  0515 04/30/24  1100   WBC AUTO x10*3/uL 9.9 14.1* 10.3 5.5   HEMOGLOBIN g/dL 11.7* 12.7* 12.8* 13.3*   HEMATOCRIT % 33.9* 37.4* 37.6* 38.8*   PLATELETS AUTO x10*3/uL 164 188 182 185   MCV fL 91 92 91 91     COAG:   Results from last 7 days   Lab Units 04/30/24  1100   INR  1.1     HEME/ENDO:  Results from last 7 days   Lab Units 05/02/24  0515   TSH mIU/L 0.61      CARDIAC:              Assessment/Plan   Principal Problem:    Unstable angina (Multi)    Edward D Hume is a 73 y.o. male who presented to outpatient cardiology visit on 4/30/24 with complaints of unstable angina. PMHx CAD, CKD, HTN, HLD, IDDM2, GERD. LHC today demonstrated MVD. Dr. Anthony and Dr. Bhumi Price discussed and decision made to admit as inpatient and proceed with MIDCAB tomorrow. Pt admitted to [facility] SDU under cardiac surgery for planned MIDCAB tomorrow with Dr. Bhumi Price. Pt transferred to [facility] ICU on return  from OR after MIDCAB (CAMEJO>LAD) with Dr. Bhumi Price on 5/2/24.     Neuro:  # Expected acute postop pain  # Arthritis  # BPPV  - Serial neuro and pain assessments  - Scheduled Tylenol, PRN Oxy, PRN Toradol  - Lidoderm patches  - PT/OT/MITT     CV:  # Severe LAD disease, moderate OM disease s/p MIDCAB (LIMA>LAD) with planned staged PCI to OM  # HTN, HLD  LVEF 65%, small Lt>Rt PFO, trace AI and TR pre/post  No wires, intrinsic NSR 60s  - EKG, NIBP  - Maintain MAP 65-90  - Remove CTs  - ASA/statin  - Metop 25 bid  - Home norvasc  - Hold home valsartan-hydrochlorothiazide for now     Pulm:  # Oxygenating well on   - Wean FiO2 to SpO2 > 92%  - ABGs prn  - Bronch hygiene     GI:   # GERD  - NPO for 4hr after extubated until BSE  - Home PPI  - Bowel regimen  - Zofran PRN     /Renal:  # CKD  - RFP daily, replete lytes per protocol  - Removed Cooper  - Strict I&Os  - Home Flomax     Heme:  # Acute blood loss anemia 2/2 CBP - stable  - CBC daily  - SCDs, Lovenox for DVTppx     Endo:  # IDDM2  - POCT BG, ISS q4  - Maintain BG < 180  - Start Lantus 15u (1/3 home dose)  - Hold home glipizide, Januvia     ID:  # Gout  - Trend temp, WBCs  - Periop abx  - Resume home allopurinol     Dispo: CTICU, transfer to SDU today anticipate discharge Sun vs Mon       I spent 60 minutes in the professional and overall care of this patient.      Tati Scanlon PA-C

## 2024-05-03 NOTE — PROGRESS NOTES
Occupational Therapy    Evaluation/Treatment    Patient Name: Edward D Hume  MRN: [id number]  : 1950  Today's Date: 24  Time Calculation  Start Time: 1451  Stop Time: 1514  Time Calculation (min): 23 min       Assessment:  OT Assessment: 72 yo presenting POD#1 for L MidCAB CABG. Able to manage ADLs and functional tansfers and independent/mod I level with adherence to MITT precautions. Pt does not demo additional need for skilled acute OT services at this time/. Will d/c from caseload, please re-consult as necessary  Prognosis: Excellent  Evaluation/Treatment Tolerance: Patient tolerated treatment well  Medical Staff Made Aware: Yes  End of Session Communication: Bedside nurse (Pelon in room at end of session)  End of Session Patient Position: Up in chair, Alarm off, not on at start of session  OT Assessment Results: Decreased endurance  Prognosis: Excellent  Evaluation/Treatment Tolerance: Patient tolerated treatment well  Medical Staff Made Aware: Yes  Strengths: Ability to acquire knowledge, Premorbid level of function, Support of Caregivers  Plan:  Treatment Interventions: ADL retraining, Functional transfer training  No Skilled OT: No acute OT goals identified  OT Discharge Recommendations: No further acute OT, No OT needed after discharge  Equipment Recommended upon Discharge:  (possible need fotr shower chair for endurance pt and spouse verbalized understanding)  OT Recommended Transfer Status: Independent  OT - OK to Discharge: Yes  Treatment Interventions: ADL retraining, Functional transfer training    Subjective   Current Problem:  1. Unstable angina (Multi)  Cardiac Catheterization Procedure    Cardiac Catheterization Procedure    Transthoracic Echo (TTE) Limited    Transthoracic Echo (TTE) Limited    Case Request Operating Room: CABG, 1 VESSEL    Case Request Operating Room: CABG, 1 VESSEL      2. Encounter for other preprocedural examination  Transthoracic Echo (TTE) Limited      3. Unstable  angina pectoris (Multi)  Anesthesia Intraoperative Transesophageal Echocardiogram    Anesthesia Intraoperative Transesophageal Echocardiogram        General:   OT Received On: 05/03/24  General  Reason for Referral: 74 yo presents as POD#1 for: CABG, 1 VESSEL MIDCAB (L)  Referred By: Tati Scanlon PA-C  Past Medical History Relevant to Rehab:   Past Medical History:   Diagnosis Date    Acute nonparalytic poliomyelitis ([facility])     Acute nonparalytic polio    Awareness under anesthesia     Body mass index (BMI) 25.0-25.9, adult 10/26/2019    Body mass index (BMI) of 25.0 to 25.9 in adult    Body mass index (BMI) 27.0-27.9, adult 09/22/2021    Body mass index (BMI) of 27.0 to 27.9 in adult    Body mass index (BMI) 29.0-29.9, adult 08/31/2019    Body mass index (BMI) of 29.0 to 29.9 in adult    CKD (chronic kidney disease)     Crushing injury of unspecified finger(s), initial encounter 06/12/2014    Injury, crush, finger    Diabetes mellitus (Multi)     Disorder of the skin and subcutaneous tissue, unspecified 09/04/2019    Skin lesion    Disorder of the skin and subcutaneous tissue, unspecified 03/30/2016    Skin lesion    Elevated prostate specific antigen (PSA)     Elevated prostate specific antigen (PSA)    GERD (gastroesophageal reflux disease)     Gout     History of falling 06/27/2016    History of fall    HLD (hyperlipidemia)     HTN (hypertension)     Other chest pain 03/07/2018    Chest pressure    Other conditions influencing health status 02/22/2017    Type 2 diabetes mellitus, uncontrolled    Other conditions influencing health status 03/04/2016    Epicondylitis    Other conditions influencing health status     Nephrolithiasis    Personal history of other mental and behavioral disorders 11/06/2018    History of depression    Personal history of other specified conditions 04/16/2019    History of dizziness    Personal history of other specified conditions 04/16/2019    History of weight loss    Personal  history of other specified conditions 06/29/2016    History of abdominal pain    Puncture wound without foreign body of left hand, initial encounter 03/05/2019    Puncture wound of hand, left    Puncture wound without foreign body of unspecified hand, initial encounter 03/05/2019    Puncture wound, hand    Superficial mycosis, unspecified 12/01/2014    Fungal otitis externa    Trigger finger, left middle finger 07/24/2017    Trigger middle finger of left hand    Trigger finger, left middle finger 07/24/2017    Trigger middle finger of left hand    Trigger finger, left ring finger 07/24/2017    Trigger ring finger of left hand    Trigger finger, right index finger 07/24/2017    Trigger index finger of right hand    Trigger finger, right middle finger 11/10/2017    Trigger middle finger of right hand     Family/Caregiver Present: Yes  Caregiver Feedback: spouse  Prior to Session Communication: Bedside nurse  Patient Position Received: Up in chair, Alarm off, not on at start of session  Preferred Learning Style: written, visual, verbal, auditory  General Comment: Pt pleasant and agreeable to eval. Pt's wife present for session.  Precautions:  Medical Precautions: Fall precautions  Post-Surgical Precautions: Move in the Tube    Pain:  Pain Assessment  Pain Assessment: 0-10  Pain Score: 1  Pain Location:  (L side of chest-incisional area, worsens with bed transfers though much better than this morning)    Objective   Cognition:  Overall Cognitive Status: Within Functional Limits  Orientation Level: Oriented X4  Attention: Within Functional Limits  Safety/Judgement: Within Functional Limits     Home Living:  Type of Home: House  Lives With: Spouse  Home Adaptive Equipment: Crutches  Home Layout: One level, Stairs to alternate level with rails  Alternate Level Stairs-Number of Steps: 12  Home Access: Stairs to enter with rails  Entrance Stairs-Number of Steps: 3  Bathroom Shower/Tub: Walk-in shower (walk in shower is  upstairs)  Bathroom Toilet: Handicapped height  Bathroom Equipment: None  Prior Function:  Level of [place]: Independent with ADLs and functional transfers  Receives Help From: Family  ADL Assistance: Independent  Homemaking Assistance:  (splits with wife)  Ambulatory Assistance: Independent  Prior Function Comments: Reports 1 recent fall.  IADL History:  Homemaking Responsibilities: Yes  IADL Comments: independent with all IADLs, able to have prn assist from family;  ADL:  Grooming Assistance: Independent  Grooming Deficit:  (simulated independence for grooming tasks to be performed standing at sink)  UE Dressing Assistance: Independent  LE Dressing Assistance: Modified independent (Device)  LE Dressing Deficit: Don/doff R sock, Don/doff L sock, Thread LLE into pants, Thread RLE into pants  ADL Comments: educated pt on figure four LB dressing technique-- good return demo  while seated EOB  Activity Tolerance:  Endurance: Endurance does not limit participation in activity     Bed Mobility/Transfers: Bed Mobility  Bed Mobility: Yes  Bed Mobility 1  Bed Mobility 1: Supine to sitting, Sitting to supine  Level of Assistance 1: Minimal verbal cues  Bed Mobility Comments 1: simultaed from flat bed for home going; excellent log roll performance though inc pain with bed transfers; cues for use of cardiac pillow for comfort    Transfers  Transfer: Yes  Transfer 1  Technique 1: Sit to stand, Stand to sit  Transfer Level of Assistance 1: Distant supervision, Modified independent    Functional Mobility:  Functional Mobility  Functional Mobility Performed: Yes  Functional Mobility 1  Device 1: No device  Assistance 1: Distant supervision  Comments 1: ambulated in room and to/from bed without AD, no overt balance or safety deficits observed. pt maintained safety and balance and all times  Sitting Balance:  Static Sitting Balance  Static Sitting-Level of Assistance: Independent  Dynamic Sitting Balance  Dynamic  Sitting-Balance:  (independent)  Standing Balance:  Static Standing Balance  Static Standing-Level of Assistance: Independent  Dynamic Standing Balance  Dynamic Standing-Balance:  (independent)    Sensation:  Light Touch: No apparent deficits  Strength:  Strength Comments: excellent overall strength    Perception:  Inattention/Neglect: Appears intact  Coordination:  Movements are Fluid and Coordinated: Yes      Extremities: RUE   RUE : Within Functional Limits, LUE   LUE: Within Functional Limits (shoulder ROM limited due to MITT, otherwise WFL for mobility and ADL management), RLE   RLE : Within Functional Limits, and LLE   LLE : Within Functional Limits    Outcome Measures:   [facility] Daily Activity  Putting on and taking off regular lower body clothing: None  Bathing (including washing, rinsing, drying): A little  Putting on and taking off regular upper body clothing: None  Toileting, which includes using toilet, bedpan or urinal: None  Taking care of personal grooming such as brushing teeth: None  Eating Meals: None  Daily Activity - Total Score: 23      Education Documentation  Handouts, taught by Enrique Glass OT at 5/3/2024  4:23 PM.  Learner: Family, Patient  Readiness: Acceptance  Method: Explanation  Response: Verbalizes Understanding    Body Mechanics, taught by Enrique Glass OT at 5/3/2024  4:23 PM.  Learner: Family, Patient  Readiness: Acceptance  Method: Explanation  Response: Verbalizes Understanding    Precautions, taught by Enrique Glass OT at 5/3/2024  4:23 PM.  Learner: Family, Patient  Readiness: Acceptance  Method: Explanation  Response: Verbalizes Understanding    ADL Training, taught by Enrique Glass OT at 5/3/2024  4:23 PM.  Learner: Family, Patient  Readiness: Acceptance  Method: Explanation  Response: Verbalizes Understanding    Education Comments  No comments found.

## 2024-05-03 NOTE — PROGRESS NOTES
Pharmacy Medication History Review    Edward D Hume is a 73 y.o. male admitted for Unstable angina (Multi). Pharmacy reviewed the patient's [id number] medications and allergies for accuracy.    The list below reflectives the updated PTA list. Please review each medication in order reconciliation for additional clarification and justification.  Prior to Admission Medications   Prescriptions Last Dose Informant   Januvia 100 mg tablet 2024 Self   Sig: Take 1 tablet (100 mg) by mouth once daily.   albuterol 90 mcg/actuation inhaler 2024 Self   Sig: Inhale 2 puffs every 6 hours if needed for shortness of breath.   allopurinol (Zyloprim) 300 mg tablet 2024 Self   Sig: Take 1 tablet (300 mg) by mouth once daily.   amLODIPine (Norvasc) 10 mg tablet 2024 Self   Sig: Take 1 tablet (10 mg) by mouth once daily.   aspirin 81 mg EC tablet 2024 Self   Sig: Take 1 tablet (81 mg) by mouth once daily.   atenolol (Tenormin) 50 mg tablet 2024 Self   Sig: TAKE 1 TABLET BY MOUTH EVERY DAY   biotin 10 mg tablet 2024 Self   Sig: Take 1 tablet (10 mg) by mouth once daily.   blood sugar diagnostic (Accu-Chek Kamala Plus test strp) strip 2024 Self   Si strip 2 times a day.   cranberry fruit extract (cranberry extract) 300 mg tablet 2024 Self   Sig: Take 650 mg by mouth once daily.   famotidine (Pepcid) 20 mg tablet 2024 Self   Sig: Take 1 tablet (20 mg) by mouth once daily at bedtime.   gemfibrozil (Lopid) 600 mg tablet 2024 Self   Sig: TAKE 1 TABLET (600 MG) BY MOUTH 2 TIMES A DAY.   glipiZIDE XL (Glucotrol XL) 10 mg 24 hr tablet 2024 Self   Sig: Take 2 tablets (20 mg) by mouth once daily.   ibuprofen 200 mg tablet Past Week Self   Sig: Take 1 tablet (200 mg) by mouth every 6 hours if needed.   insulin glargine (Toujeo SoloStar U-300 Insulin) 300 unit/mL (1.5 mL) injection 2024 Self   Sig: INJECT 45 UNITS UNDER THE SKIN ONCE DAILY IN THE MORNING. 45 UNITS MAX   Patient  "taking differently: 36 Units once daily at bedtime.   ketoconazole (NIZOral) 2 % cream Past Week Self   Sig: Apply topically 2 times a day. To affected areas of ears   Patient taking differently: Apply topically 2 times a day as needed for itching, irritation or rash. To affected areas of ears   omeprazole (PriLOSEC) 40 mg DR capsule 4/30/2024 Self   Sig: Take 1 capsule (40 mg) by mouth once daily in the morning. Take before meals.   pen needle, diabetic (BD Ultra-Fine Short Pen Needle) 31 gauge x 5/16\" needle Past Week Self   Sig: USE AS DIRECTED   tamsulosin (Flomax) 0.4 mg 24 hr capsule 4/30/2024 Self   Sig: Take 1 capsule (0.4 mg) by mouth 2 times a day.   valsartan-hydrochlorothiazide (Diovan-HCT) 160-12.5 mg tablet 5/1/2024 Self   Sig: Take 1 tablet by mouth once daily.      Facility-Administered Medications: None         The list below reflectives the updated allergy list. Please review each documented allergy for additional clarification and justification.  Allergies  Reviewed by Annita Erwin RN on 5/2/2024        Severity Reactions Comments    Amoxicillin High Shortness of breath Tolerated cefazolin May 2024    Metformin High Swelling, Angioedema     Iodinated Contrast Media Low Palpitations             Below are additional concerns with the patient's PTA list.  Spoke to patient    Jean Marie Lilly    "

## 2024-05-03 NOTE — PROGRESS NOTES
Physical Therapy    Physical Therapy Evaluation    Patient Name: Edward D Hume  MRN: [id number]  Today's Date: 5/3/2024   Time Calculation  Start Time: 1405  Stop Time: 1420  Time Calculation (min): 15 min    Assessment/Plan   PT Assessment  PT Assessment Results: Decreased strength, Decreased endurance, Impaired balance, Decreased mobility, Pain  Rehab Prognosis: Good  End of Session Communication: Bedside nurse  Assessment Comment: PT Evaluation Completed. The patient presented with decreased balance, endurance, and increased pain and fatigue. These impairments are negatively impacting his/her ability to perform at baseline functional level, in home environment.  This patient would benefit from skilled therapy intervention to address limitations and progress towards the PT goals.  Anticipate no PT needs at discharge  End of Session Patient Position: Up in chair, Alarm off, not on at start of session  IP OR SWING BED PT PLAN  Inpatient or Swing Bed: Inpatient  PT Plan  Treatment/Interventions: Bed mobility, Transfer training, Gait training, Balance training, Stair training, Strengthening, Endurance training, Therapeutic exercise, Range of motion, Therapeutic activity, Home exercise program  PT Plan: Skilled PT  PT Frequency: One time follow up visit  PT Discharge Recommendations: No PT needed after discharge  PT Recommended Transfer Status: Assist x1  PT - OK to Discharge: Yes (PT POC established.)      Subjective   General Visit Information:  General  Reason for Referral: CABG  Referred By: Tati Scanlon PA-C  Past Medical History Relevant to Rehab:   Past Medical History:   Diagnosis Date    Acute nonparalytic poliomyelitis ([facility])     Acute nonparalytic polio    Awareness under anesthesia     Body mass index (BMI) 25.0-25.9, adult 10/26/2019    Body mass index (BMI) of 25.0 to 25.9 in adult    Body mass index (BMI) 27.0-27.9, adult 09/22/2021    Body mass index (BMI) of 27.0 to 27.9 in adult    Body mass  index (BMI) 29.0-29.9, adult 08/31/2019    Body mass index (BMI) of 29.0 to 29.9 in adult    CKD (chronic kidney disease)     Crushing injury of unspecified finger(s), initial encounter 06/12/2014    Injury, crush, finger    Diabetes mellitus (Multi)     Disorder of the skin and subcutaneous tissue, unspecified 09/04/2019    Skin lesion    Disorder of the skin and subcutaneous tissue, unspecified 03/30/2016    Skin lesion    Elevated prostate specific antigen (PSA)     Elevated prostate specific antigen (PSA)    GERD (gastroesophageal reflux disease)     Gout     History of falling 06/27/2016    History of fall    HLD (hyperlipidemia)     HTN (hypertension)     Other chest pain 03/07/2018    Chest pressure    Other conditions influencing health status 02/22/2017    Type 2 diabetes mellitus, uncontrolled    Other conditions influencing health status 03/04/2016    Epicondylitis    Other conditions influencing health status     Nephrolithiasis    Personal history of other mental and behavioral disorders 11/06/2018    History of depression    Personal history of other specified conditions 04/16/2019    History of dizziness    Personal history of other specified conditions 04/16/2019    History of weight loss    Personal history of other specified conditions 06/29/2016    History of abdominal pain    Puncture wound without foreign body of left hand, initial encounter 03/05/2019    Puncture wound of hand, left    Puncture wound without foreign body of unspecified hand, initial encounter 03/05/2019    Puncture wound, hand    Superficial mycosis, unspecified 12/01/2014    Fungal otitis externa    Trigger finger, left middle finger 07/24/2017    Trigger middle finger of left hand    Trigger finger, left middle finger 07/24/2017    Trigger middle finger of left hand    Trigger finger, left ring finger 07/24/2017    Trigger ring finger of left hand    Trigger finger, right index finger 07/24/2017    Trigger index finger of  right hand    Trigger finger, right middle finger 11/10/2017    Trigger middle finger of right hand       Prior to Session Communication: Bedside nurse  Patient Position Received: Up in chair, Alarm off, not on at start of session  General Comment: Pt pleasant and agreeable to eval. Pt's wife present for session.  Home Living:  Home Living  Type of Home: House  Lives With: Spouse  Home Adaptive Equipment: Crutches  Home Layout: One level  Home Access: Stairs to enter with rails  Entrance Stairs-Number of Steps: 3  Prior Level of Function:  Prior Function Per Pt/Caregiver Report  Level of [place]: Independent with ADLs and functional transfers  Receives Help From: Family  ADL Assistance: Independent  Homemaking Assistance:  (splits with wife)  Ambulatory Assistance: Independent  Prior Function Comments: Reports 1 recent fall.  Precautions:  Precautions  Medical Precautions: Fall precautions  Post-Surgical Precautions: Move in the Tube  Vital Signs:       Objective   Pain:  Pain Assessment  Pain Assessment: 0-10  Pain Score: 2  Pain Location:  (surgical site)  Cognition:  Cognition  Overall Cognitive Status: Within Functional Limits  Orientation Level: Oriented X4    General Assessments:  Activity Tolerance  Endurance: Endurance does not limit participation in activity    Sensation  Light Touch:  (occasional numbness/tingling in feet)    Postural Control  Postural Control: Within Functional Limits    Static Sitting Balance  Static Sitting-Balance Support: Feet supported, Bilateral upper extremity supported  Static Sitting-Level of Assistance: Independent  Dynamic Sitting Balance  Dynamic Sitting-Balance Support: Feet supported, Bilateral upper extremity supported  Dynamic Sitting-Comments: Independent    Static Standing Balance  Static Standing-Balance Support: No upper extremity supported  Static Standing-Level of Assistance: Close supervision  Dynamic Standing Balance  Dynamic Standing-Balance Support: No upper  extremity supported  Dynamic Standing-Comments: Close supervision  Functional Assessments:  Bed Mobility  Bed Mobility: No    Transfers  Transfer: Yes  Transfer 1  Technique 1: Sit to stand, Stand to sit  Transfer Level of Assistance 1: Close supervision    Ambulation/Gait Training  Ambulation/Gait Training Performed: Yes  Ambulation/Gait Training 1  Surface 1: Level tile  Assistance 1: Close supervision  Comments/Distance (ft) 1: 175 ft. Pt overall demonstrating good stability. Mild swaying.  Extremity/Trunk Assessments:  RUE   RUE : Within Functional Limits (shoulder ROM limited due to MITT, otherwise WFL.)  LUE   LUE: Within Functional Limits (shoulder ROM limited due to MITT, otherwise WFL.)  RLE   RLE : Within Functional Limits  LLE   LLE : Within Functional Limits  Outcome Measures:  [facility] Basic Mobility  Turning from your back to your side while in a flat bed without using bedrails: A little  Moving from lying on your back to sitting on the side of a flat bed without using bedrails: A little  Moving to and from bed to chair (including a wheelchair): A little  Standing up from a chair using your arms (e.g. wheelchair or bedside chair): A little  To walk in hospital room: A little  Climbing 3-5 steps with railing: A little  Basic Mobility - Total Score: 18    Encounter Problems       Encounter Problems (Active)       Balance       complete all mobility with normal balance while dual tasking, negotiating in a dynamic environment, carrying items, etc., with proactive and reactive static and dynamic standing and sitting tasks independently.        Start:  05/03/24    Expected End:  05/17/24               Mobility       STG - Patient will ambulate 250 ft independently.        Start:  05/03/24    Expected End:  05/17/24            STG - Patient will ascend and descend 3 stairs using unilateral HR independently.        Start:  05/03/24    Expected End:  05/17/24               PT Transfers       STG - Patient will  perform bed mobility independently using log roll technique.        Start:  05/03/24    Expected End:  05/17/24            STG - Patient will transfer sit to and from stand independently.        Start:  05/03/24    Expected End:  05/17/24               Safety       LTG - Patient will adhere to MITT during ADL's and transfers independently.       Start:  05/03/24    Expected End:  05/17/24                   Education Documentation  Handouts, taught by Dana Cole, PT at 5/3/2024  3:34 PM.  Learner: Patient  Readiness: Acceptance  Method: Explanation, Handout  Response: Verbalizes Understanding    Precautions, taught by Dana Cole, PT at 5/3/2024  3:34 PM.  Learner: Patient  Readiness: Acceptance  Method: Explanation, Handout  Response: Verbalizes Understanding    Body Mechanics, taught by Dana Cole, PT at 5/3/2024  3:34 PM.  Learner: Patient  Readiness: Acceptance  Method: Explanation, Handout  Response: Verbalizes Understanding    Mobility Training, taught by Dana Cole, PT at 5/3/2024  3:34 PM.  Learner: Patient  Readiness: Acceptance  Method: Explanation, Handout  Response: Verbalizes Understanding    Education Comments  No comments found.

## 2024-05-04 ENCOUNTER — APPOINTMENT (OUTPATIENT)
Dept: RADIOLOGY | Facility: HOSPITAL | Age: 74
DRG: 234 | End: 2024-05-04
Payer: MEDICARE

## 2024-05-04 LAB
ALBUMIN SERPL BCP-MCNC: 3.6 G/DL (ref 3.4–5)
ANION GAP SERPL CALC-SCNC: 13 MMOL/L (ref 10–20)
BUN SERPL-MCNC: 33 MG/DL (ref 6–23)
CA-I BLD-SCNC: 1.13 MMOL/L (ref 1.1–1.33)
CALCIUM SERPL-MCNC: 7.8 MG/DL (ref 8.6–10.3)
CHLORIDE SERPL-SCNC: 105 MMOL/L (ref 98–107)
CO2 SERPL-SCNC: 24 MMOL/L (ref 21–32)
CREAT SERPL-MCNC: 1.37 MG/DL (ref 0.5–1.3)
EGFRCR SERPLBLD CKD-EPI 2021: 55 ML/MIN/1.73M*2
ERYTHROCYTE [DISTWIDTH] IN BLOOD BY AUTOMATED COUNT: 12.8 % (ref 11.5–14.5)
GLUCOSE BLD MANUAL STRIP-MCNC: 167 MG/DL (ref 74–99)
GLUCOSE BLD MANUAL STRIP-MCNC: 179 MG/DL (ref 74–99)
GLUCOSE BLD MANUAL STRIP-MCNC: 187 MG/DL (ref 74–99)
GLUCOSE BLD MANUAL STRIP-MCNC: 269 MG/DL (ref 74–99)
GLUCOSE SERPL-MCNC: 162 MG/DL (ref 74–99)
HCT VFR BLD AUTO: 31.9 % (ref 41–52)
HGB BLD-MCNC: 10.9 G/DL (ref 13.5–17.5)
MAGNESIUM SERPL-MCNC: 2 MG/DL (ref 1.6–2.4)
MCH RBC QN AUTO: 32 PG (ref 26–34)
MCHC RBC AUTO-ENTMCNC: 34.2 G/DL (ref 32–36)
MCV RBC AUTO: 94 FL (ref 80–100)
NRBC BLD-RTO: 0 /100 WBCS (ref 0–0)
PHOSPHATE SERPL-MCNC: 2.9 MG/DL (ref 2.5–4.9)
PLATELET # BLD AUTO: 138 X10*3/UL (ref 150–450)
POTASSIUM SERPL-SCNC: 3.7 MMOL/L (ref 3.5–5.3)
RBC # BLD AUTO: 3.41 X10*6/UL (ref 4.5–5.9)
SODIUM SERPL-SCNC: 138 MMOL/L (ref 136–145)
WBC # BLD AUTO: 7.5 X10*3/UL (ref 4.4–11.3)

## 2024-05-04 PROCEDURE — 82330 ASSAY OF CALCIUM: CPT | Performed by: NURSE PRACTITIONER

## 2024-05-04 PROCEDURE — 2500000004 HC RX 250 GENERAL PHARMACY W/ HCPCS (ALT 636 FOR OP/ED): Performed by: STUDENT IN AN ORGANIZED HEALTH CARE EDUCATION/TRAINING PROGRAM

## 2024-05-04 PROCEDURE — 2500000006 HC RX 250 W HCPCS SELF ADMINISTERED DRUGS (ALT 637 FOR ALL PAYERS): Performed by: NURSE PRACTITIONER

## 2024-05-04 PROCEDURE — 2500000005 HC RX 250 GENERAL PHARMACY W/O HCPCS: Performed by: STUDENT IN AN ORGANIZED HEALTH CARE EDUCATION/TRAINING PROGRAM

## 2024-05-04 PROCEDURE — 94668 MNPJ CHEST WALL SBSQ: CPT

## 2024-05-04 PROCEDURE — 83735 ASSAY OF MAGNESIUM: CPT | Performed by: STUDENT IN AN ORGANIZED HEALTH CARE EDUCATION/TRAINING PROGRAM

## 2024-05-04 PROCEDURE — 2500000002 HC RX 250 W HCPCS SELF ADMINISTERED DRUGS (ALT 637 FOR MEDICARE OP, ALT 636 FOR OP/ED): Performed by: STUDENT IN AN ORGANIZED HEALTH CARE EDUCATION/TRAINING PROGRAM

## 2024-05-04 PROCEDURE — 94660 CPAP INITIATION&MGMT: CPT

## 2024-05-04 PROCEDURE — 85027 COMPLETE CBC AUTOMATED: CPT | Performed by: STUDENT IN AN ORGANIZED HEALTH CARE EDUCATION/TRAINING PROGRAM

## 2024-05-04 PROCEDURE — 36415 COLL VENOUS BLD VENIPUNCTURE: CPT | Performed by: STUDENT IN AN ORGANIZED HEALTH CARE EDUCATION/TRAINING PROGRAM

## 2024-05-04 PROCEDURE — 2500000001 HC RX 250 WO HCPCS SELF ADMINISTERED DRUGS (ALT 637 FOR MEDICARE OP): Performed by: NURSE PRACTITIONER

## 2024-05-04 PROCEDURE — 9420000001 HC RT PATIENT EDUCATION 5 MIN

## 2024-05-04 PROCEDURE — 80069 RENAL FUNCTION PANEL: CPT | Performed by: STUDENT IN AN ORGANIZED HEALTH CARE EDUCATION/TRAINING PROGRAM

## 2024-05-04 PROCEDURE — 2060000001 HC INTERMEDIATE ICU ROOM DAILY

## 2024-05-04 PROCEDURE — 2500000006 HC RX 250 W HCPCS SELF ADMINISTERED DRUGS (ALT 637 FOR ALL PAYERS): Performed by: STUDENT IN AN ORGANIZED HEALTH CARE EDUCATION/TRAINING PROGRAM

## 2024-05-04 PROCEDURE — 2500000001 HC RX 250 WO HCPCS SELF ADMINISTERED DRUGS (ALT 637 FOR MEDICARE OP): Performed by: STUDENT IN AN ORGANIZED HEALTH CARE EDUCATION/TRAINING PROGRAM

## 2024-05-04 PROCEDURE — 2500000004 HC RX 250 GENERAL PHARMACY W/ HCPCS (ALT 636 FOR OP/ED): Performed by: NURSE PRACTITIONER

## 2024-05-04 PROCEDURE — 82947 ASSAY GLUCOSE BLOOD QUANT: CPT

## 2024-05-04 PROCEDURE — 71046 X-RAY EXAM CHEST 2 VIEWS: CPT

## 2024-05-04 PROCEDURE — 36415 COLL VENOUS BLD VENIPUNCTURE: CPT | Performed by: NURSE PRACTITIONER

## 2024-05-04 PROCEDURE — 2500000002 HC RX 250 W HCPCS SELF ADMINISTERED DRUGS (ALT 637 FOR MEDICARE OP, ALT 636 FOR OP/ED): Performed by: NURSE PRACTITIONER

## 2024-05-04 PROCEDURE — 71046 X-RAY EXAM CHEST 2 VIEWS: CPT | Performed by: RADIOLOGY

## 2024-05-04 RX ORDER — INSULIN GLARGINE 100 [IU]/ML
30 INJECTION, SOLUTION SUBCUTANEOUS NIGHTLY
Status: DISCONTINUED | OUTPATIENT
Start: 2024-05-04 | End: 2024-05-05

## 2024-05-04 RX ORDER — FUROSEMIDE 10 MG/ML
40 INJECTION INTRAMUSCULAR; INTRAVENOUS ONCE
Status: COMPLETED | OUTPATIENT
Start: 2024-05-04 | End: 2024-05-04

## 2024-05-04 RX ORDER — POTASSIUM CHLORIDE 20 MEQ/1
40 TABLET, EXTENDED RELEASE ORAL ONCE
Status: COMPLETED | OUTPATIENT
Start: 2024-05-04 | End: 2024-05-04

## 2024-05-04 RX ORDER — CYCLOBENZAPRINE HCL 5 MG
5 TABLET ORAL 3 TIMES DAILY
Status: DISCONTINUED | OUTPATIENT
Start: 2024-05-04 | End: 2024-05-05

## 2024-05-04 RX ADMIN — MUPIROCIN 1 APPLICATION: 20 OINTMENT TOPICAL at 08:53

## 2024-05-04 RX ADMIN — ACETAMINOPHEN 650 MG: 325 TABLET ORAL at 10:30

## 2024-05-04 RX ADMIN — POTASSIUM CHLORIDE 40 MEQ: 1500 TABLET, EXTENDED RELEASE ORAL at 14:48

## 2024-05-04 RX ADMIN — ONDANSETRON 4 MG: 2 INJECTION INTRAMUSCULAR; INTRAVENOUS at 07:33

## 2024-05-04 RX ADMIN — FUROSEMIDE 40 MG: 10 INJECTION, SOLUTION INTRAMUSCULAR; INTRAVENOUS at 14:29

## 2024-05-04 RX ADMIN — ATORVASTATIN CALCIUM 80 MG: 80 TABLET, FILM COATED ORAL at 21:08

## 2024-05-04 RX ADMIN — SENNOSIDES AND DOCUSATE SODIUM 2 TABLET: 8.6; 5 TABLET ORAL at 08:51

## 2024-05-04 RX ADMIN — CYCLOBENZAPRINE HYDROCHLORIDE 5 MG: 5 TABLET, FILM COATED ORAL at 14:29

## 2024-05-04 RX ADMIN — OXYCODONE HYDROCHLORIDE 5 MG: 5 TABLET ORAL at 21:15

## 2024-05-04 RX ADMIN — INSULIN LISPRO 10 UNITS: 100 INJECTION, SOLUTION INTRAVENOUS; SUBCUTANEOUS at 12:33

## 2024-05-04 RX ADMIN — OXYCODONE HYDROCHLORIDE 10 MG: 5 TABLET ORAL at 06:21

## 2024-05-04 RX ADMIN — ENOXAPARIN SODIUM 40 MG: 40 INJECTION SUBCUTANEOUS at 14:29

## 2024-05-04 RX ADMIN — ACETAMINOPHEN 650 MG: 325 TABLET ORAL at 23:24

## 2024-05-04 RX ADMIN — MUPIROCIN 1 APPLICATION: 20 OINTMENT TOPICAL at 21:07

## 2024-05-04 RX ADMIN — ALLOPURINOL 300 MG: 300 TABLET ORAL at 08:51

## 2024-05-04 RX ADMIN — ASPIRIN 81 MG 81 MG: 81 TABLET ORAL at 08:51

## 2024-05-04 RX ADMIN — LIDOCAINE 4% 1 PATCH: 40 PATCH TOPICAL at 10:30

## 2024-05-04 RX ADMIN — POLYETHYLENE GLYCOL 3350 17 G: 17 POWDER, FOR SOLUTION ORAL at 08:52

## 2024-05-04 RX ADMIN — ACETAMINOPHEN 650 MG: 325 TABLET ORAL at 04:59

## 2024-05-04 RX ADMIN — INSULIN GLARGINE 30 UNITS: 100 INJECTION, SOLUTION SUBCUTANEOUS at 21:08

## 2024-05-04 RX ADMIN — METOPROLOL TARTRATE 25 MG: 25 TABLET, FILM COATED ORAL at 21:08

## 2024-05-04 RX ADMIN — SENNOSIDES AND DOCUSATE SODIUM 2 TABLET: 8.6; 5 TABLET ORAL at 21:08

## 2024-05-04 RX ADMIN — METOPROLOL TARTRATE 25 MG: 25 TABLET, FILM COATED ORAL at 08:51

## 2024-05-04 RX ADMIN — TAMSULOSIN HYDROCHLORIDE 0.4 MG: 0.4 CAPSULE ORAL at 21:08

## 2024-05-04 RX ADMIN — INSULIN LISPRO 5 UNITS: 100 INJECTION, SOLUTION INTRAVENOUS; SUBCUTANEOUS at 16:58

## 2024-05-04 RX ADMIN — AMLODIPINE BESYLATE 10 MG: 10 TABLET ORAL at 08:52

## 2024-05-04 RX ADMIN — CYCLOBENZAPRINE HYDROCHLORIDE 5 MG: 5 TABLET, FILM COATED ORAL at 21:07

## 2024-05-04 RX ADMIN — PANTOPRAZOLE SODIUM 40 MG: 40 TABLET, DELAYED RELEASE ORAL at 06:09

## 2024-05-04 RX ADMIN — ACETAMINOPHEN 650 MG: 325 TABLET ORAL at 16:58

## 2024-05-04 RX ADMIN — TAMSULOSIN HYDROCHLORIDE 0.4 MG: 0.4 CAPSULE ORAL at 08:51

## 2024-05-04 RX ADMIN — INSULIN LISPRO 5 UNITS: 100 INJECTION, SOLUTION INTRAVENOUS; SUBCUTANEOUS at 09:01

## 2024-05-04 RX ADMIN — OXYCODONE HYDROCHLORIDE 10 MG: 5 TABLET ORAL at 13:24

## 2024-05-04 ASSESSMENT — PAIN SCALES - GENERAL
PAINLEVEL_OUTOF10: 5 - MODERATE PAIN
PAINLEVEL_OUTOF10: 7
PAINLEVEL_OUTOF10: 1
PAINLEVEL_OUTOF10: 2
PAINLEVEL_OUTOF10: 10 - WORST POSSIBLE PAIN
PAINLEVEL_OUTOF10: 0 - NO PAIN
PAINLEVEL_OUTOF10: 1

## 2024-05-04 ASSESSMENT — PAIN - FUNCTIONAL ASSESSMENT
PAIN_FUNCTIONAL_ASSESSMENT: 0-10

## 2024-05-04 ASSESSMENT — PAIN DESCRIPTION - LOCATION: LOCATION: CHEST

## 2024-05-04 ASSESSMENT — ACTIVITIES OF DAILY LIVING (ADL): LACK_OF_TRANSPORTATION: NO

## 2024-05-04 NOTE — PROGRESS NOTES
05/04/24 1550   Discharge Planning   Living Arrangements Spouse/significant other   Support Systems Spouse/significant other   Assistance Needed Independent, works   Type of Residence Private residence   Number of Stairs to Enter Residence 4   Number of Stairs Within Residence 12   Do you have animals or pets at home? Yes   Type of Animals or Pets one dog   Who is requesting discharge planning? Provider   Home or Post Acute Services In home services   Type of Home Care Services Home nursing visits;Home OT;Home PT   Patient expects to be discharged to: Home with HHC   Does the patient need discharge transport arranged? Yes   RoundTrip coordination needed? Yes   Has discharge transport been arranged? No   Financial Resource Strain   How hard is it for you to pay for the very basics like food, housing, medical care, and heating? Not hard   Housing Stability   In the last 12 months, was there a time when you were not able to pay the mortgage or rent on time? N   In the last 12 months, how many places have you lived? 1   In the last 12 months, was there a time when you did not have a steady place to sleep or slept in a shelter (including now)? N   Transportation Needs   In the past 12 months, has lack of transportation kept you from medical appointments or from getting medications? no   In the past 12 months, has lack of transportation kept you from meetings, work, or from getting things needed for daily living? No   Patient Choice   Provider Choice list and CMS website (https://medicare.gov/care-compare#search) for post-acute Quality and Resource Measure Data were provided and reviewed with: Patient   Patient / Family choosing to utilize agency / facility established prior to hospitalization No        Met with patient at bedside and explained my role as care coordinator. Patient lives in the house with his wife. He is independent with his care at home. He drives. Patient denies use of any ambulatory devices, No  oxygen in use at home, no HD. His PCP is Dr. Roselia Ibrahim and he seen her 2 months ago. Pharmacy he uses is [facility]. Patient had cabg done. Explained to patient about cardiac surgery and HHC going home. He agreed. He would like [facility] as his choice.

## 2024-05-04 NOTE — PROGRESS NOTES
"Edward D Hume is a 73 y.o. male on day 3 of admission presenting with Unstable angina (Multi)    Subjective   No issues over night  C/o right shoulder and back muscle pain/soreness  Objective     Physical Exam  Vitals reviewed.   Constitutional:       General: He is not in acute distress.     Appearance: Normal appearance. He is not ill-appearing.   Neck:      Comments: RIJ site well approximated w/o drainage or hematoma  Cardiovascular:      Rate and Rhythm: Normal rate and regular rhythm.      Pulses: Normal pulses.      Heart sounds: No murmur heard.     Comments: Left thoracotomy incision well approximated w/o drainage or hematoma    Pulmonary:      Effort: No respiratory distress.      Breath sounds: No stridor. Rales present. No wheezing.   Chest:      Chest wall: Tenderness present.   Abdominal:      Palpations: Abdomen is soft.      Comments: Bloated non tender  +flatus  -BM  Poor appetite   Genitourinary:     Comments: voids  Musculoskeletal:      Right lower leg: Edema present.      Left lower leg: Edema present.   Skin:     General: Skin is warm and dry.   Neurological:      General: No focal deficit present.      Mental Status: He is alert and oriented to person, place, and time.   Psychiatric:         Mood and Affect: Mood normal.         Behavior: Behavior normal.         Thought Content: Thought content normal.         Judgment: Judgment normal.         Review of Systems    Last Recorded Vitals   Blood pressure (!) 118/48, pulse 84, temperature 36.8 °C (98.2 °F), temperature source Temporal, resp. rate 16, height 1.778 m (5' 10\"), weight 97.3 kg (214 lb 8.1 oz), SpO2 95%.    Intake/Output Last 3 Shifts  I/O last 3 completed shifts:  In: 1470 (15.1 mL/kg) [P.O.:770; IV Piggyback:700]  Out: 2225 (22.9 mL/kg) [Urine:2015 (0.6 mL/kg/hr); Chest Tube:210]  Weight: 97.3 kg     Lines        Recent Labs  CMP:  Results from last 7 days   Lab Units 05/04/24  0510 05/03/24  0448 05/02/24  1150 05/02/24  0515 " 04/30/24  1100   SODIUM mmol/L 138 140 139 139 140   POTASSIUM mmol/L 3.7 3.8 3.7 3.6 4.2   CHLORIDE mmol/L 105 106 105 106 104   CO2 mmol/L 24 24 24 22 26   ANION GAP mmol/L 13 14 14 15 14   BUN mg/dL 33* 30* 32* 32* 29*   CREATININE mg/dL 1.37* 1.13 1.25 1.04 1.44*   EGFR mL/min/1.73m*2 55* 69 62 77 52*   MAGNESIUM mg/dL 2.00 2.00 2.00  --   --    ALBUMIN g/dL 3.6 3.7 3.9 4.1  --    ALT U/L  --   --   --  12  --    AST U/L  --   --   --  18  --    BILIRUBIN TOTAL mg/dL  --   --   --  0.3  --      CBC:  Results from last 7 days   Lab Units 05/04/24  0510 05/03/24  0448 05/02/24  1150 05/02/24  0515 04/30/24  1100   WBC AUTO x10*3/uL 7.5 9.9 14.1* 10.3 5.5   HEMOGLOBIN g/dL 10.9* 11.7* 12.7* 12.8* 13.3*   HEMATOCRIT % 31.9* 33.9* 37.4* 37.6* 38.8*   PLATELETS AUTO x10*3/uL 138* 164 188 182 185   MCV fL 94 91 92 91 91     COAG:   Results from last 7 days   Lab Units 04/30/24  1100   INR  1.1     HEME/ENDO:  Results from last 7 days   Lab Units 05/02/24  0515   TSH mIU/L 0.61      CARDIAC:        Imaging  XR chest 1 view   Final Result   Recent surgery. Stable left-sided chest tube. Stable right IJ vein   catheter.        Decreased left basilar atelectasis.        No pneumothorax.        MACRO:   None        Signed by: Efraín Cornejo 5/3/2024 8:15 AM   Dictation workstation:   [id number]      XR chest 1 view   Final Result   Support devices in place as above. Bandlike opacity in the left lung   base. No definite pleural effusion or pneumothorax.        MACRO:   None.        Signed by: Mary Law 5/2/2024 12:19 PM   Dictation workstation:   [id number]      CT chest wo IV contrast   Final Result   1.  Severe atherosclerotic coronary artery calcifications   2. No acute abnormalities noted.        MACRO:   None        Signed by: Bianka Read 5/1/2024 10:13 PM   Dictation workstation:   [id number]      XR chest 1 view   Final Result   No acute abnormalities. Mild hyperinflation suggests COPD. Overall no   significant  change since the prior exam        Signed by: Bianka Read 5/1/2024 7:42 PM   Dictation workstation:   [id number]      Transthoracic Echo (TTE) Limited   Final Result      Cardiac Catheterization Procedure   Final Result      Anesthesia Intraoperative Transesophageal Echocardiogram    (Results Pending)   XR chest 2 views    (Results Pending)        Medications  Scheduled medications  acetaminophen, 650 mg, oral, q6h  allopurinol, 300 mg, oral, Daily  amLODIPine, 10 mg, oral, Daily  aspirin, 81 mg, oral, Daily  atorvastatin, 80 mg, oral, Nightly  enoxaparin, 40 mg, subcutaneous, q24h  insulin glargine, 15 Units, subcutaneous, Nightly  insulin lispro, 0-15 Units, subcutaneous, TID with meals  lidocaine, 1 patch, transdermal, q24h  metoprolol tartrate, 25 mg, oral, BID  mupirocin, 1 Application, Each Nostril, BID  pantoprazole, 40 mg, oral, Daily before breakfast   Or  pantoprazole, 40 mg, intravenous, Daily before breakfast  polyethylene glycol, 17 g, oral, Daily  sennosides-docusate sodium, 2 tablet, oral, BID  tamsulosin, 0.4 mg, oral, BID      Continuous medications  lactated Ringer's, 5 mL/hr, Last Rate: 5 mL/hr (05/02/24 1826)      PRN medications  PRN medications: dextrose, dextrose **OR** glucagon, glucagon, ketorolac, naloxone, ondansetron ODT **OR** ondansetron, oxyCODONE, oxyCODONE, oxygen, oxygen    Assessment/Plan   Principal Problem:    Unstable angina (Multi)    Edward D Hume is a 73 y.o. male who presented to outpatient cardiology visit on 4/30/24 with complaints of unstable angina. PMHx CAD, CKD, HTN, HLD, IDDM2, GERD. LHC today demonstrated MVD. Dr. Anthony and Dr. Bhumi Price discussed and decision made to admit as inpatient and proceed with MIDCAB tomorrow. Pt admitted to [facility] SDU under cardiac surgery for planned MIDCAB tomorrow with Dr. Bhumi Price. Pt transferred to [facility] ICU on return from OR after MIDCAB (CAMEJO>LAD) with Dr. Bhumi Price on 5/2/24.     Neuro:  # Expected acute postop pain- well  controlled  # Back/shoulder pain  # Arthritis  # BPPV  - Serial neuro and pain assessments  - Scheduled Tylenol, PRN Oxy, dc PRN Toradol d/t ckd  - Lidoderm patches  - PT/OT/MITT  - Add Flexeral     CV:  # Severe LAD disease, moderate OM disease s/p MIDCAB (LIMA>LAD) with planned staged PCI to OM  # HTN, HLD  LVEF 65%, small Lt>Rt PFO, trace AI and TR pre/post  No wires, intrinsic NSR 60s  - EKG, NIBP  - Maintain MAP 65-90  - ASA/statin  - Metop 25 bid titrate as HD permits  - Home norvasc  - Hold home valsartan-hydrochlorothiazide for now  - Lasix 40mg iv x1 today     Pulm:  # Oxygenating well on   POD CXR small to moderate left effusion and atelectasis   - Wean FiO2 to SpO2 > 92%  - ABGs prn  - Bronch hygiene     GI:   # GERD  - Diet  - Home PPI  - Bowel regimen  - Zofran PRN     /Renal:  # CKD- at baseline  - RFP daily, replete lytes per protocol  - Strict I&Os  - Home Flomax     Heme:  # Acute blood loss anemia 2/2 CBP - stable  - CBC daily  - SCDs, Lovenox for DVTppx     Endo:  # IDDM2  - POCT BG, ISS AC HS  - Maintain BG < 180  - Increase Lantus 30u (45u home dose)  - Hold home glipizide, Januvia      ID:  # Gout  - Trend temp, WBCs  - Periop abx  - Resume home allopurinol    Discussed with Dr Bhumi Price  I spent 45 minutes in the professional and overall care of this patient.    Nikia Bocanegra, APRN-CNP

## 2024-05-05 LAB
ALBUMIN SERPL BCP-MCNC: 3.4 G/DL (ref 3.4–5)
ANION GAP SERPL CALC-SCNC: 13 MMOL/L (ref 10–20)
BLOOD EXPIRATION DATE: NORMAL
BUN SERPL-MCNC: 33 MG/DL (ref 6–23)
CALCIUM SERPL-MCNC: 7.6 MG/DL (ref 8.6–10.3)
CHLORIDE SERPL-SCNC: 105 MMOL/L (ref 98–107)
CO2 SERPL-SCNC: 24 MMOL/L (ref 21–32)
CREAT SERPL-MCNC: 1.37 MG/DL (ref 0.5–1.3)
DISPENSE STATUS: NORMAL
EGFRCR SERPLBLD CKD-EPI 2021: 55 ML/MIN/1.73M*2
ERYTHROCYTE [DISTWIDTH] IN BLOOD BY AUTOMATED COUNT: 12.5 % (ref 11.5–14.5)
GLUCOSE BLD MANUAL STRIP-MCNC: 139 MG/DL (ref 74–99)
GLUCOSE BLD MANUAL STRIP-MCNC: 156 MG/DL (ref 74–99)
GLUCOSE BLD MANUAL STRIP-MCNC: 161 MG/DL (ref 74–99)
GLUCOSE BLD MANUAL STRIP-MCNC: 243 MG/DL (ref 74–99)
GLUCOSE SERPL-MCNC: 186 MG/DL (ref 74–99)
HCT VFR BLD AUTO: 30.1 % (ref 41–52)
HGB BLD-MCNC: 10.3 G/DL (ref 13.5–17.5)
MAGNESIUM SERPL-MCNC: 2.1 MG/DL (ref 1.6–2.4)
MCH RBC QN AUTO: 31 PG (ref 26–34)
MCHC RBC AUTO-ENTMCNC: 34.2 G/DL (ref 32–36)
MCV RBC AUTO: 91 FL (ref 80–100)
NRBC BLD-RTO: 0 /100 WBCS (ref 0–0)
PHOSPHATE SERPL-MCNC: 2.6 MG/DL (ref 2.5–4.9)
PLATELET # BLD AUTO: 144 X10*3/UL (ref 150–450)
POTASSIUM SERPL-SCNC: 3.8 MMOL/L (ref 3.5–5.3)
PRODUCT BLOOD TYPE: 6200
PRODUCT CODE: NORMAL
RBC # BLD AUTO: 3.32 X10*6/UL (ref 4.5–5.9)
SODIUM SERPL-SCNC: 138 MMOL/L (ref 136–145)
UNIT ABO: NORMAL
UNIT NUMBER: NORMAL
UNIT RH: NORMAL
UNIT VOLUME: 350
WBC # BLD AUTO: 7.2 X10*3/UL (ref 4.4–11.3)
XM INTEP: NORMAL

## 2024-05-05 PROCEDURE — 94668 MNPJ CHEST WALL SBSQ: CPT

## 2024-05-05 PROCEDURE — 2060000001 HC INTERMEDIATE ICU ROOM DAILY

## 2024-05-05 PROCEDURE — 85027 COMPLETE CBC AUTOMATED: CPT | Performed by: STUDENT IN AN ORGANIZED HEALTH CARE EDUCATION/TRAINING PROGRAM

## 2024-05-05 PROCEDURE — 97530 THERAPEUTIC ACTIVITIES: CPT | Mod: GP,CQ

## 2024-05-05 PROCEDURE — 2500000004 HC RX 250 GENERAL PHARMACY W/ HCPCS (ALT 636 FOR OP/ED): Performed by: STUDENT IN AN ORGANIZED HEALTH CARE EDUCATION/TRAINING PROGRAM

## 2024-05-05 PROCEDURE — 9420000001 HC RT PATIENT EDUCATION 5 MIN

## 2024-05-05 PROCEDURE — 2500000001 HC RX 250 WO HCPCS SELF ADMINISTERED DRUGS (ALT 637 FOR MEDICARE OP): Performed by: STUDENT IN AN ORGANIZED HEALTH CARE EDUCATION/TRAINING PROGRAM

## 2024-05-05 PROCEDURE — 2500000006 HC RX 250 W HCPCS SELF ADMINISTERED DRUGS (ALT 637 FOR ALL PAYERS): Performed by: NURSE PRACTITIONER

## 2024-05-05 PROCEDURE — 94660 CPAP INITIATION&MGMT: CPT

## 2024-05-05 PROCEDURE — 2500000001 HC RX 250 WO HCPCS SELF ADMINISTERED DRUGS (ALT 637 FOR MEDICARE OP): Performed by: NURSE PRACTITIONER

## 2024-05-05 PROCEDURE — 36415 COLL VENOUS BLD VENIPUNCTURE: CPT | Performed by: STUDENT IN AN ORGANIZED HEALTH CARE EDUCATION/TRAINING PROGRAM

## 2024-05-05 PROCEDURE — 2500000002 HC RX 250 W HCPCS SELF ADMINISTERED DRUGS (ALT 637 FOR MEDICARE OP, ALT 636 FOR OP/ED): Performed by: STUDENT IN AN ORGANIZED HEALTH CARE EDUCATION/TRAINING PROGRAM

## 2024-05-05 PROCEDURE — 2500000006 HC RX 250 W HCPCS SELF ADMINISTERED DRUGS (ALT 637 FOR ALL PAYERS): Performed by: STUDENT IN AN ORGANIZED HEALTH CARE EDUCATION/TRAINING PROGRAM

## 2024-05-05 PROCEDURE — 2500000002 HC RX 250 W HCPCS SELF ADMINISTERED DRUGS (ALT 637 FOR MEDICARE OP, ALT 636 FOR OP/ED): Performed by: NURSE PRACTITIONER

## 2024-05-05 PROCEDURE — 97116 GAIT TRAINING THERAPY: CPT | Mod: GP,CQ

## 2024-05-05 PROCEDURE — 80069 RENAL FUNCTION PANEL: CPT | Performed by: STUDENT IN AN ORGANIZED HEALTH CARE EDUCATION/TRAINING PROGRAM

## 2024-05-05 PROCEDURE — 2500000004 HC RX 250 GENERAL PHARMACY W/ HCPCS (ALT 636 FOR OP/ED): Performed by: NURSE PRACTITIONER

## 2024-05-05 PROCEDURE — 82947 ASSAY GLUCOSE BLOOD QUANT: CPT

## 2024-05-05 PROCEDURE — 83735 ASSAY OF MAGNESIUM: CPT | Performed by: STUDENT IN AN ORGANIZED HEALTH CARE EDUCATION/TRAINING PROGRAM

## 2024-05-05 RX ORDER — POTASSIUM CHLORIDE 20 MEQ/1
20 TABLET, EXTENDED RELEASE ORAL ONCE
Status: COMPLETED | OUTPATIENT
Start: 2024-05-05 | End: 2024-05-05

## 2024-05-05 RX ORDER — POTASSIUM CHLORIDE 20 MEQ/1
20 TABLET, EXTENDED RELEASE ORAL
Status: DISCONTINUED | OUTPATIENT
Start: 2024-05-05 | End: 2024-05-06 | Stop reason: HOSPADM

## 2024-05-05 RX ORDER — INSULIN GLARGINE 100 [IU]/ML
36 INJECTION, SOLUTION SUBCUTANEOUS NIGHTLY
Status: DISCONTINUED | OUTPATIENT
Start: 2024-05-05 | End: 2024-05-06 | Stop reason: HOSPADM

## 2024-05-05 RX ORDER — BISACODYL 5 MG
10 TABLET, DELAYED RELEASE (ENTERIC COATED) ORAL ONCE
Status: COMPLETED | OUTPATIENT
Start: 2024-05-05 | End: 2024-05-05

## 2024-05-05 RX ORDER — FUROSEMIDE 10 MG/ML
40 INJECTION INTRAMUSCULAR; INTRAVENOUS
Status: DISCONTINUED | OUTPATIENT
Start: 2024-05-05 | End: 2024-05-06 | Stop reason: HOSPADM

## 2024-05-05 RX ORDER — GLIPIZIDE 10 MG/1
20 TABLET, FILM COATED, EXTENDED RELEASE ORAL DAILY
Status: DISCONTINUED | OUTPATIENT
Start: 2024-05-05 | End: 2024-05-06 | Stop reason: HOSPADM

## 2024-05-05 RX ORDER — CYCLOBENZAPRINE HCL 5 MG
5 TABLET ORAL 3 TIMES DAILY PRN
Status: DISCONTINUED | OUTPATIENT
Start: 2024-05-05 | End: 2024-05-06 | Stop reason: HOSPADM

## 2024-05-05 RX ADMIN — ACETAMINOPHEN 650 MG: 325 TABLET ORAL at 04:52

## 2024-05-05 RX ADMIN — OXYCODONE HYDROCHLORIDE 5 MG: 5 TABLET ORAL at 09:11

## 2024-05-05 RX ADMIN — METOPROLOL TARTRATE 25 MG: 25 TABLET, FILM COATED ORAL at 21:03

## 2024-05-05 RX ADMIN — OXYCODONE HYDROCHLORIDE 5 MG: 5 TABLET ORAL at 03:45

## 2024-05-05 RX ADMIN — POTASSIUM CHLORIDE 20 MEQ: 1500 TABLET, EXTENDED RELEASE ORAL at 14:17

## 2024-05-05 RX ADMIN — MUPIROCIN 1 APPLICATION: 20 OINTMENT TOPICAL at 21:03

## 2024-05-05 RX ADMIN — POLYETHYLENE GLYCOL 3350 17 G: 17 POWDER, FOR SOLUTION ORAL at 09:10

## 2024-05-05 RX ADMIN — OXYCODONE HYDROCHLORIDE 10 MG: 5 TABLET ORAL at 22:20

## 2024-05-05 RX ADMIN — INSULIN LISPRO 5 UNITS: 100 INJECTION, SOLUTION INTRAVENOUS; SUBCUTANEOUS at 09:13

## 2024-05-05 RX ADMIN — POTASSIUM CHLORIDE 20 MEQ: 1500 TABLET, EXTENDED RELEASE ORAL at 06:31

## 2024-05-05 RX ADMIN — TAMSULOSIN HYDROCHLORIDE 0.4 MG: 0.4 CAPSULE ORAL at 21:02

## 2024-05-05 RX ADMIN — POTASSIUM CHLORIDE 20 MEQ: 1500 TABLET, EXTENDED RELEASE ORAL at 09:11

## 2024-05-05 RX ADMIN — BISACODYL 10 MG: 5 TABLET, COATED ORAL at 10:33

## 2024-05-05 RX ADMIN — SENNOSIDES AND DOCUSATE SODIUM 2 TABLET: 8.6; 5 TABLET ORAL at 09:10

## 2024-05-05 RX ADMIN — AMLODIPINE BESYLATE 10 MG: 10 TABLET ORAL at 09:11

## 2024-05-05 RX ADMIN — INSULIN LISPRO 15 UNITS: 100 INJECTION, SOLUTION INTRAVENOUS; SUBCUTANEOUS at 11:07

## 2024-05-05 RX ADMIN — PANTOPRAZOLE SODIUM 40 MG: 40 TABLET, DELAYED RELEASE ORAL at 06:31

## 2024-05-05 RX ADMIN — CYCLOBENZAPRINE HYDROCHLORIDE 5 MG: 5 TABLET, FILM COATED ORAL at 09:12

## 2024-05-05 RX ADMIN — FUROSEMIDE 40 MG: 10 INJECTION, SOLUTION INTRAMUSCULAR; INTRAVENOUS at 14:17

## 2024-05-05 RX ADMIN — GLIPIZIDE 20 MG: 10 TABLET, FILM COATED, EXTENDED RELEASE ORAL at 09:20

## 2024-05-05 RX ADMIN — TAMSULOSIN HYDROCHLORIDE 0.4 MG: 0.4 CAPSULE ORAL at 09:11

## 2024-05-05 RX ADMIN — ACETAMINOPHEN 650 MG: 325 TABLET ORAL at 10:30

## 2024-05-05 RX ADMIN — ENOXAPARIN SODIUM 40 MG: 40 INJECTION SUBCUTANEOUS at 14:17

## 2024-05-05 RX ADMIN — ACETAMINOPHEN 650 MG: 325 TABLET ORAL at 17:30

## 2024-05-05 RX ADMIN — METOPROLOL TARTRATE 25 MG: 25 TABLET, FILM COATED ORAL at 09:10

## 2024-05-05 RX ADMIN — ALLOPURINOL 300 MG: 300 TABLET ORAL at 09:10

## 2024-05-05 RX ADMIN — ASPIRIN 81 MG 81 MG: 81 TABLET ORAL at 09:10

## 2024-05-05 RX ADMIN — INSULIN GLARGINE 36 UNITS: 100 INJECTION, SOLUTION SUBCUTANEOUS at 21:03

## 2024-05-05 RX ADMIN — OXYCODONE HYDROCHLORIDE 10 MG: 5 TABLET ORAL at 18:09

## 2024-05-05 RX ADMIN — ATORVASTATIN CALCIUM 80 MG: 80 TABLET, FILM COATED ORAL at 21:02

## 2024-05-05 RX ADMIN — FUROSEMIDE 40 MG: 10 INJECTION, SOLUTION INTRAMUSCULAR; INTRAVENOUS at 09:10

## 2024-05-05 RX ADMIN — MUPIROCIN 1 APPLICATION: 20 OINTMENT TOPICAL at 09:21

## 2024-05-05 ASSESSMENT — COGNITIVE AND FUNCTIONAL STATUS - GENERAL
MOBILITY SCORE: 24
HELP NEEDED FOR BATHING: A LITTLE
MOBILITY SCORE: 24
DAILY ACTIVITIY SCORE: 23

## 2024-05-05 ASSESSMENT — PAIN SCALES - GENERAL
PAINLEVEL_OUTOF10: 6
PAINLEVEL_OUTOF10: 7
PAINLEVEL_OUTOF10: 6
PAINLEVEL_OUTOF10: 5 - MODERATE PAIN
PAINLEVEL_OUTOF10: 4
PAINLEVEL_OUTOF10: 0 - NO PAIN
PAINLEVEL_OUTOF10: 4
PAINLEVEL_OUTOF10: 1
PAINLEVEL_OUTOF10: 8

## 2024-05-05 ASSESSMENT — PAIN - FUNCTIONAL ASSESSMENT
PAIN_FUNCTIONAL_ASSESSMENT: 0-10

## 2024-05-05 ASSESSMENT — PAIN DESCRIPTION - LOCATION: LOCATION: CHEST

## 2024-05-05 NOTE — PROGRESS NOTES
Physical Therapy                 Therapy Communication Note    Patient Name: Edward D Hume  MRN: [id number]  Today's Date: 5/5/2024     Discipline: Physical Therapy    Missed Visit Reason: Missed Visit Reason:  (Per discussion with PTA, pt is IND with all functional mobility with no further acute PT needs. PT orders will be discharged at this time.)    Missed Time: Discharge    Comment:

## 2024-05-05 NOTE — PROGRESS NOTES
"Edward D Hume is a 73 y.o. male on day 4 of admission presenting with Unstable angina (Multi)    Subjective   No issues over night   Feels better this morning after BM last evenig  HD stable  SR 80's  Objective     Physical Exam  Vitals reviewed.   Constitutional:       General: He is not in acute distress.     Appearance: Normal appearance. He is not ill-appearing.   Neck:      Comments: RIJ site well approximated w/o drainage or hematoma  Cardiovascular:      Rate and Rhythm: Normal rate and regular rhythm.      Pulses: Normal pulses.      Heart sounds: No murmur heard.     Comments: Left thoracotomy incision well approximated w/o drainage or hematoma  Pulmonary:      Effort: No respiratory distress.      Breath sounds: No stridor. Rales present. No wheezing.   Chest:      Chest wall: Tenderness present.   Abdominal:      Palpations: Abdomen is soft.      Comments: Bloated non tender  +flatus  +BM  Poor appetite   Genitourinary:     Comments: voids  Musculoskeletal:      Right lower leg: Edema present.      Left lower leg: Edema present.   Skin:     General: Skin is warm and dry.   Neurological:      General: No focal deficit present.      Mental Status: He is alert and oriented to person, place, and time.   Psychiatric:         Mood and Affect: Mood normal.         Behavior: Behavior normal.         Thought Content: Thought content normal.         Judgment: Judgment normal.   Review of Systems  Denies chest pain, palpations, light headedness, dizziness sob, n/v fever, chills    Last Recorded Vitals   Blood pressure 113/65, pulse 80, temperature 36.9 °C (98.4 °F), temperature source Temporal, resp. rate 16, height 1.778 m (5' 10\"), weight 97.6 kg (215 lb 2.7 oz), SpO2 98%.    Intake/Output Last 3 Shifts  I/O last 3 completed shifts:  In: 1090 (11.2 mL/kg) [P.O.:1090]  Out: 2780 (28.6 mL/kg) [Urine:2600 (0.7 mL/kg/hr); Chest Tube:180]  Weight: 97.3 kg     Lines        Recent Labs  CMP:  Results from last 7 days "   Lab Units 05/05/24  0451 05/04/24  0510 05/03/24  0448 05/02/24  1150 05/02/24  0515 04/30/24  1100   SODIUM mmol/L 138 138 140 139 139 140   POTASSIUM mmol/L 3.8 3.7 3.8 3.7 3.6 4.2   CHLORIDE mmol/L 105 105 106 105 106 104   CO2 mmol/L 24 24 24 24 22 26   ANION GAP mmol/L 13 13 14 14 15 14   BUN mg/dL 33* 33* 30* 32* 32* 29*   CREATININE mg/dL 1.37* 1.37* 1.13 1.25 1.04 1.44*   EGFR mL/min/1.73m*2 55* 55* 69 62 77 52*   MAGNESIUM mg/dL 2.10 2.00 2.00 2.00  --   --    ALBUMIN g/dL 3.4 3.6 3.7 3.9 4.1  --    ALT U/L  --   --   --   --  12  --    AST U/L  --   --   --   --  18  --    BILIRUBIN TOTAL mg/dL  --   --   --   --  0.3  --      CBC:  Results from last 7 days   Lab Units 05/05/24 0451 05/04/24  0510 05/03/24 0448 05/02/24  1150 05/02/24 0515 04/30/24  1100   WBC AUTO x10*3/uL 7.2 7.5 9.9 14.1* 10.3 5.5   HEMOGLOBIN g/dL 10.3* 10.9* 11.7* 12.7* 12.8* 13.3*   HEMATOCRIT % 30.1* 31.9* 33.9* 37.4* 37.6* 38.8*   PLATELETS AUTO x10*3/uL 144* 138* 164 188 182 185   MCV fL 91 94 91 92 91 91     COAG:   Results from last 7 days   Lab Units 04/30/24  1100   INR  1.1     HEME/ENDO:  Results from last 7 days   Lab Units 05/02/24  0515   TSH mIU/L 0.61      CARDIAC:        Imaging  XR chest 2 views   Final Result   1.  Small to moderate left pleural effusion with adjacent dependent   compressive atelectasis in the lung bases new from prior after chest   tube removal.                  MACRO:   None        Signed by: Joseph Schoenberger 5/4/2024 11:52 AM   Dictation workstation:   [id number]      XR chest 1 view   Final Result   Recent surgery. Stable left-sided chest tube. Stable right IJ vein   catheter.        Decreased left basilar atelectasis.        No pneumothorax.        MACRO:   None        Signed by: Efraín Cornejo 5/3/2024 8:15 AM   Dictation workstation:   [id number]      XR chest 1 view   Final Result   Support devices in place as above. Bandlike opacity in the left lung   base. No definite pleural  effusion or pneumothorax.        MACRO:   None.        Signed by: Mary Law 5/2/2024 12:19 PM   Dictation workstation:   [id number]      CT chest wo IV contrast   Final Result   1.  Severe atherosclerotic coronary artery calcifications   2. No acute abnormalities noted.        MACRO:   None        Signed by: Bianka Read 5/1/2024 10:13 PM   Dictation workstation:   [id number]      XR chest 1 view   Final Result   No acute abnormalities. Mild hyperinflation suggests COPD. Overall no   significant change since the prior exam        Signed by: Bianka Read 5/1/2024 7:42 PM   Dictation workstation:   [id number]      Transthoracic Echo (TTE) Limited   Final Result      Cardiac Catheterization Procedure   Final Result      Anesthesia Intraoperative Transesophageal Echocardiogram    (Results Pending)        Medications  Scheduled medications  acetaminophen, 650 mg, oral, q6h  allopurinol, 300 mg, oral, Daily  amLODIPine, 10 mg, oral, Daily  aspirin, 81 mg, oral, Daily  atorvastatin, 80 mg, oral, Nightly  enoxaparin, 40 mg, subcutaneous, q24h  furosemide, 40 mg, intravenous, BID  glipiZIDE XL, 20 mg, oral, Daily  insulin glargine, 36 Units, subcutaneous, Nightly  insulin lispro, 0-15 Units, subcutaneous, TID with meals  lidocaine, 1 patch, transdermal, q24h  metoprolol tartrate, 25 mg, oral, BID  mupirocin, 1 Application, Each Nostril, BID  pantoprazole, 40 mg, oral, Daily before breakfast  polyethylene glycol, 17 g, oral, Daily  potassium chloride CR, 20 mEq, oral, BID  sennosides-docusate sodium, 2 tablet, oral, BID  tamsulosin, 0.4 mg, oral, BID      Continuous medications     PRN medications  PRN medications: cyclobenzaprine, dextrose, dextrose **OR** glucagon, glucagon, naloxone, [DISCONTINUED] ondansetron ODT **OR** ondansetron, oxyCODONE, oxyCODONE, oxygen    Assessment/Plan   Principal Problem:    Unstable angina (Multi)    Edward D Hume is a 73 y.o. male who presented to outpatient cardiology visit on  4/30/24 with complaints of unstable angina. PMHx CAD, CKD, HTN, HLD, IDDM2, GERD. LHC today demonstrated MVD. Dr. Anthony and Dr. Bhumi Price discussed and decision made to admit as inpatient and proceed with MIDCAB tomorrow. Pt admitted to [facility] SDU under cardiac surgery for planned MIDCAB tomorrow with Dr. Bhumi Price. Pt transferred to [facility] ICU on return from OR after MIDCAB (CAMEJO>LAD) with Dr. Bhumi Price on 5/2/24.     Neuro:  # Expected acute postop pain- well controlled  # Back/shoulder pain  # Arthritis  # BPPV  - Serial neuro and pain assessments  - Scheduled Tylenol, PRN Oxy, dc PRN Toradol d/t ckd  - Lidoderm patches  - PT/OT/MITT  - Flexeril PRN     CV:  # Severe LAD disease, moderate OM disease s/p MIDCAB (LIMA>LAD) with planned staged PCI to OM  # HTN, HLD  LVEF 65%, small Lt>Rt PFO, trace AI and TR pre/post  No wires, intrinsic NSR 60s  - EKG, NIBP  - Maintain MAP 65-90  - ASA/statin  - Metop 25 bid titrate as HD permits  - Home norvasc  - Hold home valsartan-hydrochlorothiazide for now  - Lasix 40mg iv bid w/ scheduled Potassium     Pulm:  # Oxygenating well on   POD CXR small to moderate left effusion and atelectasis   - Wean FiO2 to SpO2 > 92%  - ABGs prn  - Bronch hygiene  - Continue diuresis      GI:   # GERD  - Diet  - Home PPI  - Bowel regimen  - Zofran PRN     /Renal:  # CKD- at baseline  - RFP daily, replete lytes per protocol  - Strict I&Os  - Home Flomax     Heme:  # Acute blood loss anemia 2/2 CBP - stable  - CBC daily  - SCDs, Lovenox for DVTppx     Endo:  # IDDM2  - POCT BG, ISS AC HS  - Maintain BG < 180  - Increase to home dose Lantis 36u  - Resume home glipizide, Hold Januvia      ID:  # Gout  - Trend temp, WBCs  - Periop abx  - home allopurinol    I spent 45 minutes in the professional and overall care of this patient.    Nikia Bocanegra, APRN-CNP

## 2024-05-05 NOTE — PROGRESS NOTES
Physical Therapy    Physical Therapy Treatment    Patient Name: Edward D Hume  MRN: [id number]  Today's Date: 5/5/2024  Time Calculation  Start Time: 1259  Stop Time: 1324  Time Calculation (min): 25 min       Assessment/Plan   PT Assessment  End of Session Communication: Bedside nurse  End of Session Patient Position: Up in chair, Alarm off, not on at start of session (cleared by RN)  PT Plan  Inpatient/Swing Bed or Outpatient: Inpatient  PT Plan  Treatment/Interventions: Bed mobility, Transfer training, Gait training, Stair training  PT Plan: Skilled PT  PT Frequency: One time follow up visit  PT Discharge Recommendations: No PT needed after discharge  PT Recommended Transfer Status: Independent  PT - OK to Discharge: Yes (per POC)      General Visit Information:   PT  Visit  PT Received On: 05/05/24  General  Reason for Referral: 74 yo presents as POD#1 for: CABG, 1 VESSEL MIDCAB (L)  Referred By: Tati Scanlon PA-C  Past Medical History Relevant to Rehab:   Past Medical History:   Diagnosis Date    Acute nonparalytic poliomyelitis ([facility])     Acute nonparalytic polio    Awareness under anesthesia     Body mass index (BMI) 25.0-25.9, adult 10/26/2019    Body mass index (BMI) of 25.0 to 25.9 in adult    Body mass index (BMI) 27.0-27.9, adult 09/22/2021    Body mass index (BMI) of 27.0 to 27.9 in adult    Body mass index (BMI) 29.0-29.9, adult 08/31/2019    Body mass index (BMI) of 29.0 to 29.9 in adult    CKD (chronic kidney disease)     Crushing injury of unspecified finger(s), initial encounter 06/12/2014    Injury, crush, finger    Diabetes mellitus (Multi)     Disorder of the skin and subcutaneous tissue, unspecified 09/04/2019    Skin lesion    Disorder of the skin and subcutaneous tissue, unspecified 03/30/2016    Skin lesion    Elevated prostate specific antigen (PSA)     Elevated prostate specific antigen (PSA)    GERD (gastroesophageal reflux disease)     Gout     History of falling 06/27/2016     History of fall    HLD (hyperlipidemia)     HTN (hypertension)     Other chest pain 03/07/2018    Chest pressure    Other conditions influencing health status 02/22/2017    Type 2 diabetes mellitus, uncontrolled    Other conditions influencing health status 03/04/2016    Epicondylitis    Other conditions influencing health status     Nephrolithiasis    Personal history of other mental and behavioral disorders 11/06/2018    History of depression    Personal history of other specified conditions 04/16/2019    History of dizziness    Personal history of other specified conditions 04/16/2019    History of weight loss    Personal history of other specified conditions 06/29/2016    History of abdominal pain    Puncture wound without foreign body of left hand, initial encounter 03/05/2019    Puncture wound of hand, left    Puncture wound without foreign body of unspecified hand, initial encounter 03/05/2019    Puncture wound, hand    Superficial mycosis, unspecified 12/01/2014    Fungal otitis externa    Trigger finger, left middle finger 07/24/2017    Trigger middle finger of left hand    Trigger finger, left middle finger 07/24/2017    Trigger middle finger of left hand    Trigger finger, left ring finger 07/24/2017    Trigger ring finger of left hand    Trigger finger, right index finger 07/24/2017    Trigger index finger of right hand    Trigger finger, right middle finger 11/10/2017    Trigger middle finger of right hand       Prior to Session Communication: Bedside nurse  Patient Position Received: Up in chair, Alarm off, not on at start of session    Subjective   Precautions:  Precautions  Medical Precautions: Fall precautions  Post-Surgical Precautions: Move in the Tube  Precautions Comment: Pt unable to state MITT precautions, education provided  Vital Signs:  Vital Signs  Heart Rate: 86  Heart Rate Source: Monitor  SpO2: 98 %  Patient Position: Standing    Objective   Pain:  Pain Assessment  Pain Assessment:  0-10  Pain Score: 0 - No pain  Cognition:     Postural Control:  Static Sitting Balance  Static Sitting-Balance Support: Feet supported, Bilateral upper extremity supported  Static Sitting-Level of Assistance: Independent  Static Standing Balance  Static Standing-Balance Support: No upper extremity supported  Static Standing-Level of Assistance: Independent  Extremity/Trunk Assessments:    Activity Tolerance:     Treatments:       Therapeutic Activity  Therapeutic Activity Performed: Yes  Therapeutic Activity 1: Education of home safety provided, energy conservation, and HEP    Bed Mobility  Bed Mobility: Yes  Bed Mobility 1  Bed Mobility 1: Supine to sitting, Sitting to supine, Log roll  Level of Assistance 1: Independent  Bed Mobility Comments 1: Min VC for correct sequencing, pt able to perform within MITT precautions    Ambulation/Gait Training  Ambulation/Gait Training Performed: Yes  Ambulation/Gait Training 1  Surface 1: Level tile  Device 1: No device  Assistance 1: Independent  Quality of Gait 1:  (reciprocal gait pattern noted)  Comments/Distance (ft) 1: 485, 100 (No LOB noted, moderate SOB and fatigue.)  Transfers  Transfer: Yes  Transfer 1  Technique 1: Sit to stand, Stand to sit  Transfer Level of Assistance 1: Independent  Trials/Comments 1: No LOB noted, pt pushes from chair sit>stand within MITT precautions and reaches back within MITT precautions while performing stand>sit    Stairs  Stairs: Yes  Stairs  Rails 1: Left  Curb Step 1: No  Device 1: Railing  Assistance 1: Independent  Comment/Number of Steps 1: Pt ascends/descends 12 steps with reciprocal gait pattern. No LOB noted, moderate fatigue    Outcome Measures:  [facility] Basic Mobility  Turning from your back to your side while in a flat bed without using bedrails: None  Moving from lying on your back to sitting on the side of a flat bed without using bedrails: None  Moving to and from bed to chair (including a wheelchair): None  Standing up from  a chair using your arms (e.g. wheelchair or bedside chair): None  To walk in hospital room: None  Climbing 3-5 steps with railing: None  Basic Mobility - Total Score: 24    Education Documentation  Handouts, taught by Eliza Feng PTA at 5/5/2024  2:13 PM.  Learner: Patient  Readiness: Acceptance  Method: Explanation  Response: Verbalizes Understanding  Comment: car transfer education and demonstration provided    Precautions, taught by Eliza Feng PTA at 5/5/2024  2:13 PM.  Learner: Patient  Readiness: Acceptance  Method: Explanation  Response: Verbalizes Understanding  Comment: car transfer education and demonstration provided    Body Mechanics, taught by Eliza Feng PTA at 5/5/2024  2:13 PM.  Learner: Patient  Readiness: Acceptance  Method: Explanation  Response: Verbalizes Understanding  Comment: car transfer education and demonstration provided    Mobility Training, taught by Eliza Feng PTA at 5/5/2024  2:13 PM.  Learner: Patient  Readiness: Acceptance  Method: Explanation  Response: Verbalizes Understanding  Comment: car transfer education and demonstration provided    Education Comments  No comments found.        OP EDUCATION:       Encounter Problems       Encounter Problems (Resolved)       Balance       complete all mobility with normal balance while dual tasking, negotiating in a dynamic environment, carrying items, etc., with proactive and reactive static and dynamic standing and sitting tasks independently.  (Met)       Start:  05/03/24    Expected End:  05/17/24    Resolved:  05/05/24            Mobility       STG - Patient will ambulate 250 ft independently.  (Met)       Start:  05/03/24    Expected End:  05/17/24    Resolved:  05/05/24         STG - Patient will ascend and descend 3 stairs using unilateral HR independently.  (Met)       Start:  05/03/24    Expected End:  05/17/24    Resolved:  05/05/24            PT Transfers       STG - Patient will perform bed mobility independently using  log roll technique.  (Met)       Start:  05/03/24    Expected End:  05/17/24    Resolved:  05/05/24         STG - Patient will transfer sit to and from stand independently.  (Met)       Start:  05/03/24    Expected End:  05/17/24    Resolved:  05/05/24            Safety       LTG - Patient will adhere to MITT during ADL's and transfers independently. (Met)       Start:  05/03/24    Expected End:  05/17/24    Resolved:  05/05/24

## 2024-05-06 ENCOUNTER — APPOINTMENT (OUTPATIENT)
Dept: RADIOLOGY | Facility: HOSPITAL | Age: 74
DRG: 234 | End: 2024-05-06
Payer: MEDICARE

## 2024-05-06 ENCOUNTER — HOME HEALTH ADMISSION (OUTPATIENT)
Dept: HOME HEALTH SERVICES | Facility: HOME HEALTH | Age: 74
End: 2024-05-06
Payer: MEDICARE

## 2024-05-06 ENCOUNTER — DOCUMENTATION (OUTPATIENT)
Dept: HOME HEALTH SERVICES | Facility: HOME HEALTH | Age: 74
End: 2024-05-06
Payer: MEDICARE

## 2024-05-06 VITALS
BODY MASS INDEX: 31.09 KG/M2 | DIASTOLIC BLOOD PRESSURE: 76 MMHG | SYSTOLIC BLOOD PRESSURE: 110 MMHG | HEART RATE: 87 BPM | WEIGHT: 217.15 LBS | RESPIRATION RATE: 16 BRPM | HEIGHT: 70 IN | TEMPERATURE: 98 F | OXYGEN SATURATION: 98 %

## 2024-05-06 LAB
ALBUMIN SERPL BCP-MCNC: 3.4 G/DL (ref 3.4–5)
ANION GAP SERPL CALC-SCNC: 15 MMOL/L (ref 10–20)
ATRIAL RATE: 82 BPM
BUN SERPL-MCNC: 31 MG/DL (ref 6–23)
CALCIUM SERPL-MCNC: 8 MG/DL (ref 8.6–10.3)
CHLORIDE SERPL-SCNC: 105 MMOL/L (ref 98–107)
CO2 SERPL-SCNC: 21 MMOL/L (ref 21–32)
CREAT SERPL-MCNC: 1.21 MG/DL (ref 0.5–1.3)
EGFRCR SERPLBLD CKD-EPI 2021: 64 ML/MIN/1.73M*2
ERYTHROCYTE [DISTWIDTH] IN BLOOD BY AUTOMATED COUNT: 12.6 % (ref 11.5–14.5)
GLUCOSE BLD MANUAL STRIP-MCNC: 150 MG/DL (ref 74–99)
GLUCOSE BLD MANUAL STRIP-MCNC: 173 MG/DL (ref 74–99)
GLUCOSE SERPL-MCNC: 105 MG/DL (ref 74–99)
HCT VFR BLD AUTO: 32.1 % (ref 41–52)
HGB BLD-MCNC: 11.1 G/DL (ref 13.5–17.5)
MAGNESIUM SERPL-MCNC: 2.1 MG/DL (ref 1.6–2.4)
MCH RBC QN AUTO: 31.8 PG (ref 26–34)
MCHC RBC AUTO-ENTMCNC: 34.6 G/DL (ref 32–36)
MCV RBC AUTO: 92 FL (ref 80–100)
NRBC BLD-RTO: 0 /100 WBCS (ref 0–0)
P AXIS: 63 DEGREES
P OFFSET: 178 MS
P ONSET: 123 MS
PHOSPHATE SERPL-MCNC: 3 MG/DL (ref 2.5–4.9)
PLATELET # BLD AUTO: 165 X10*3/UL (ref 150–450)
POTASSIUM SERPL-SCNC: 3.8 MMOL/L (ref 3.5–5.3)
PR INTERVAL: 204 MS
Q ONSET: 225 MS
QRS COUNT: 13 BEATS
QRS DURATION: 108 MS
QT INTERVAL: 396 MS
QTC CALCULATION(BAZETT): 462 MS
QTC FREDERICIA: 439 MS
R AXIS: 35 DEGREES
RBC # BLD AUTO: 3.49 X10*6/UL (ref 4.5–5.9)
SODIUM SERPL-SCNC: 137 MMOL/L (ref 136–145)
T AXIS: 37 DEGREES
T OFFSET: 423 MS
VENTRICULAR RATE: 82 BPM
WBC # BLD AUTO: 8.3 X10*3/UL (ref 4.4–11.3)

## 2024-05-06 PROCEDURE — 85027 COMPLETE CBC AUTOMATED: CPT | Performed by: STUDENT IN AN ORGANIZED HEALTH CARE EDUCATION/TRAINING PROGRAM

## 2024-05-06 PROCEDURE — 2500000004 HC RX 250 GENERAL PHARMACY W/ HCPCS (ALT 636 FOR OP/ED): Performed by: NURSE PRACTITIONER

## 2024-05-06 PROCEDURE — 82947 ASSAY GLUCOSE BLOOD QUANT: CPT

## 2024-05-06 PROCEDURE — 2500000001 HC RX 250 WO HCPCS SELF ADMINISTERED DRUGS (ALT 637 FOR MEDICARE OP): Performed by: NURSE PRACTITIONER

## 2024-05-06 PROCEDURE — 2500000006 HC RX 250 W HCPCS SELF ADMINISTERED DRUGS (ALT 637 FOR ALL PAYERS): Performed by: NURSE PRACTITIONER

## 2024-05-06 PROCEDURE — 2500000006 HC RX 250 W HCPCS SELF ADMINISTERED DRUGS (ALT 637 FOR ALL PAYERS): Performed by: STUDENT IN AN ORGANIZED HEALTH CARE EDUCATION/TRAINING PROGRAM

## 2024-05-06 PROCEDURE — 9420000001 HC RT PATIENT EDUCATION 5 MIN

## 2024-05-06 PROCEDURE — 2500000001 HC RX 250 WO HCPCS SELF ADMINISTERED DRUGS (ALT 637 FOR MEDICARE OP): Performed by: STUDENT IN AN ORGANIZED HEALTH CARE EDUCATION/TRAINING PROGRAM

## 2024-05-06 PROCEDURE — 71046 X-RAY EXAM CHEST 2 VIEWS: CPT

## 2024-05-06 PROCEDURE — 71046 X-RAY EXAM CHEST 2 VIEWS: CPT | Performed by: RADIOLOGY

## 2024-05-06 PROCEDURE — 83735 ASSAY OF MAGNESIUM: CPT | Performed by: STUDENT IN AN ORGANIZED HEALTH CARE EDUCATION/TRAINING PROGRAM

## 2024-05-06 PROCEDURE — 94668 MNPJ CHEST WALL SBSQ: CPT

## 2024-05-06 PROCEDURE — 36415 COLL VENOUS BLD VENIPUNCTURE: CPT | Performed by: STUDENT IN AN ORGANIZED HEALTH CARE EDUCATION/TRAINING PROGRAM

## 2024-05-06 PROCEDURE — 80069 RENAL FUNCTION PANEL: CPT | Performed by: STUDENT IN AN ORGANIZED HEALTH CARE EDUCATION/TRAINING PROGRAM

## 2024-05-06 PROCEDURE — 2500000002 HC RX 250 W HCPCS SELF ADMINISTERED DRUGS (ALT 637 FOR MEDICARE OP, ALT 636 FOR OP/ED): Performed by: STUDENT IN AN ORGANIZED HEALTH CARE EDUCATION/TRAINING PROGRAM

## 2024-05-06 RX ORDER — POTASSIUM CHLORIDE 20 MEQ/1
TABLET, EXTENDED RELEASE ORAL
Qty: 35 TABLET | Refills: 0 | Status: SHIPPED | OUTPATIENT
Start: 2024-05-06 | End: 2024-05-28 | Stop reason: ALTCHOICE

## 2024-05-06 RX ORDER — METOPROLOL TARTRATE 25 MG/1
25 TABLET, FILM COATED ORAL 2 TIMES DAILY
Qty: 60 TABLET | Refills: 0 | Status: SHIPPED | OUTPATIENT
Start: 2024-05-06 | End: 2024-06-03 | Stop reason: SDUPTHER

## 2024-05-06 RX ORDER — POTASSIUM CHLORIDE 20 MEQ/1
TABLET, EXTENDED RELEASE ORAL
Qty: 35 TABLET | Refills: 0 | Status: SHIPPED | OUTPATIENT
Start: 2024-05-06 | End: 2024-05-06

## 2024-05-06 RX ORDER — AMOXICILLIN 250 MG
2 CAPSULE ORAL 2 TIMES DAILY PRN
Qty: 28 TABLET | Refills: 0 | Status: SHIPPED | OUTPATIENT
Start: 2024-05-06 | End: 2024-05-13

## 2024-05-06 RX ORDER — POLYETHYLENE GLYCOL 3350 17 G/17G
17 POWDER, FOR SOLUTION ORAL DAILY
Qty: 7 PACKET | Refills: 0 | Status: SHIPPED | OUTPATIENT
Start: 2024-05-07 | End: 2024-05-14

## 2024-05-06 RX ORDER — ACETAMINOPHEN 325 MG/1
650 TABLET ORAL EVERY 6 HOURS PRN
COMMUNITY
Start: 2024-05-06 | End: 2024-05-28 | Stop reason: WASHOUT

## 2024-05-06 RX ORDER — FUROSEMIDE 20 MG/1
TABLET ORAL 2 TIMES DAILY
Qty: 35 TABLET | Refills: 0 | Status: SHIPPED | OUTPATIENT
Start: 2024-05-06 | End: 2024-05-28 | Stop reason: ALTCHOICE

## 2024-05-06 RX ORDER — ATORVASTATIN CALCIUM 80 MG/1
80 TABLET, FILM COATED ORAL NIGHTLY
Qty: 30 TABLET | Refills: 0 | Status: SHIPPED | OUTPATIENT
Start: 2024-05-06 | End: 2024-06-03 | Stop reason: SDUPTHER

## 2024-05-06 RX ORDER — OXYCODONE HYDROCHLORIDE 5 MG/1
5 TABLET ORAL EVERY 6 HOURS PRN
Qty: 12 TABLET | Refills: 0 | Status: SHIPPED | OUTPATIENT
Start: 2024-05-06 | End: 2024-05-09

## 2024-05-06 RX ADMIN — OXYCODONE HYDROCHLORIDE 5 MG: 5 TABLET ORAL at 08:22

## 2024-05-06 RX ADMIN — ASPIRIN 81 MG 81 MG: 81 TABLET ORAL at 08:17

## 2024-05-06 RX ADMIN — ACETAMINOPHEN 650 MG: 325 TABLET ORAL at 06:13

## 2024-05-06 RX ADMIN — AMLODIPINE BESYLATE 10 MG: 10 TABLET ORAL at 08:17

## 2024-05-06 RX ADMIN — SENNOSIDES AND DOCUSATE SODIUM 2 TABLET: 8.6; 5 TABLET ORAL at 08:17

## 2024-05-06 RX ADMIN — FUROSEMIDE 40 MG: 10 INJECTION, SOLUTION INTRAMUSCULAR; INTRAVENOUS at 08:17

## 2024-05-06 RX ADMIN — ALLOPURINOL 300 MG: 300 TABLET ORAL at 08:17

## 2024-05-06 RX ADMIN — PANTOPRAZOLE SODIUM 40 MG: 40 TABLET, DELAYED RELEASE ORAL at 06:14

## 2024-05-06 RX ADMIN — GLIPIZIDE 20 MG: 10 TABLET, FILM COATED, EXTENDED RELEASE ORAL at 08:23

## 2024-05-06 RX ADMIN — INSULIN LISPRO 5 UNITS: 100 INJECTION, SOLUTION INTRAVENOUS; SUBCUTANEOUS at 08:17

## 2024-05-06 RX ADMIN — INSULIN LISPRO 5 UNITS: 100 INJECTION, SOLUTION INTRAVENOUS; SUBCUTANEOUS at 11:39

## 2024-05-06 RX ADMIN — TAMSULOSIN HYDROCHLORIDE 0.4 MG: 0.4 CAPSULE ORAL at 08:17

## 2024-05-06 RX ADMIN — MUPIROCIN 1 APPLICATION: 20 OINTMENT TOPICAL at 08:17

## 2024-05-06 RX ADMIN — POTASSIUM CHLORIDE 20 MEQ: 1500 TABLET, EXTENDED RELEASE ORAL at 08:26

## 2024-05-06 RX ADMIN — METOPROLOL TARTRATE 25 MG: 25 TABLET, FILM COATED ORAL at 08:17

## 2024-05-06 ASSESSMENT — COGNITIVE AND FUNCTIONAL STATUS - GENERAL
MOBILITY SCORE: 24
DAILY ACTIVITIY SCORE: 23
HELP NEEDED FOR BATHING: A LITTLE

## 2024-05-06 ASSESSMENT — PAIN SCALES - GENERAL
PAINLEVEL_OUTOF10: 3
PAINLEVEL_OUTOF10: 5 - MODERATE PAIN
PAINLEVEL_OUTOF10: 3
PAINLEVEL_OUTOF10: 2

## 2024-05-06 ASSESSMENT — PAIN DESCRIPTION - LOCATION: LOCATION: CHEST

## 2024-05-06 ASSESSMENT — PAIN - FUNCTIONAL ASSESSMENT
PAIN_FUNCTIONAL_ASSESSMENT: 0-10

## 2024-05-06 ASSESSMENT — PAIN DESCRIPTION - DESCRIPTORS: DESCRIPTORS: SORE

## 2024-05-06 NOTE — CARE PLAN
The patient's goals for the shift include      The clinical goals for the shift include patient will remain safe and HDS      Problem: Fall/Injury  Goal: Not fall by end of shift  Outcome: Progressing  Goal: Be free from injury by end of the shift  Outcome: Progressing  Goal: Verbalize understanding of personal risk factors for fall in the hospital  Outcome: Progressing  Goal: Verbalize understanding of risk factor reduction measures to prevent injury from fall in the home  Outcome: Progressing  Goal: Use assistive devices by end of the shift  Outcome: Progressing  Goal: Pace activities to prevent fatigue by end of the shift  Outcome: Progressing     Problem: Pain - Adult  Goal: Verbalizes/displays adequate comfort level or baseline comfort level  Outcome: Progressing     Problem: Safety - Adult  Goal: Free from fall injury  Outcome: Progressing     Problem: Discharge Planning  Goal: Discharge to home or other facility with appropriate resources  Outcome: Progressing     Problem: Chronic Conditions and Co-morbidities  Goal: Patient's chronic conditions and co-morbidity symptoms are monitored and maintained or improved  Outcome: Progressing     Problem: Diabetes  Goal: Achieve decreasing blood glucose levels by end of shift  Outcome: Progressing  Goal: Increase stability of blood glucose readings by end of shift  Outcome: Progressing  Goal: Decrease in ketones present in urine by end of shift  Outcome: Progressing  Goal: Maintain electrolyte levels within acceptable range throughout shift  Outcome: Progressing  Goal: Maintain glucose levels >70mg/dl to <250mg/dl throughout shift  Outcome: Progressing  Goal: No changes in neurological exam by end of shift  Outcome: Progressing  Goal: Learn about and adhere to nutrition recommendations by end of shift  Outcome: Progressing  Goal: Vital signs within normal range for age by end of shift  Outcome: Progressing  Goal: Increase self care and/or family involovement by end of  shift  Outcome: Progressing  Goal: Receive DSME education by end of shift  Outcome: Progressing     Problem: Pain  Goal: Takes deep breaths with improved pain control throughout the shift  Outcome: Progressing  Goal: Turns in bed with improved pain control throughout the shift  Outcome: Progressing  Goal: Walks with improved pain control throughout the shift  Outcome: Progressing  Goal: Performs ADL's with improved pain control throughout shift  Outcome: Progressing  Goal: Participates in PT with improved pain control throughout the shift  Outcome: Progressing  Goal: Free from opioid side effects throughout the shift  Outcome: Progressing  Goal: Free from acute confusion related to pain meds throughout the shift  Outcome: Progressing

## 2024-05-06 NOTE — DISCHARGE SUMMARY
Discharge Diagnosis  Unstable angina (Multi)        Test Results Pending At Discharge  Pending Labs       No current pending labs.            Hospital Course     Edward D Hume is a 73 y.o. male who presented to outpatient cardiology visit on 4/30/24 with complaints of unstable angina. PMHx CAD, CKD, HTN, HLD, IDDM2, GERD. LHC today demonstrated MVD. Dr. Anthony and Dr. Bhumi Price discussed and decision made to admit as inpatient and proceed with MIDCAB tomorrow. Pt admitted to [facility] SDU under cardiac surgery for planned MIDCAB tomorrow with Dr. Bhumi Price. Pt transferred to [facility] ICU on return from OR after MIDCAB (CAMEJO>LAD) with Dr. Bhumi Price on 5/2/24     Post op course uneventful     Patient was diuresed for fluid volume overload post cardiac surgery; Preop weight:  96.5 kg, discharge wt: 98.5 kg    - On ASA, statin, BB,  by discharge  - No Epicardial  - Telemetry at discharge SR 80's  - 2v CXR done 05/07 and showed a resolved left effusion/ small right effuson    - Cardiac rehab referral was placed  - PT recs home and low intensity therapy  - Anticipate discharge to home with homecare     Discharged on 05/07/2024    On day of discharge, vital signs were stable and no acute distress was noted, tolerating diet, on RA, ambulating and voiding independently. All questions were answered. After VS and labs were reviewed it was determined the patient was stable for discharge.       Pertinent Physical Exam At Time of Discharge  Physical Exam  Vitals reviewed.   Constitutional:       General: He is not in acute distress.     Appearance: Normal appearance. He is not ill-appearing.   Neck:      Comments: RIJ site well approximated w/o drainage or hematoma  Cardiovascular:      Rate and Rhythm: Normal rate and regular rhythm.      Pulses: Normal pulses.      Heart sounds: No murmur heard.     Comments: Left thoracotomy incision well approximated w/o drainage or hematoma  Pulmonary:      Effort: No respiratory distress. RA      Breath sounds: No stridor, No wheezing.   Chest:      Chest wall: Tenderness present.   Abdominal:      Palpations: Abdomen is soft.      Comments: +flatus,+BM, improved appetite   Genitourinary:     Comments: voids  Musculoskeletal:      Right lower leg: Edema present. - improved     Left lower leg: Edema present. - improved  Skin:     General: Skin is warm and dry.   Neurological:      General: No focal deficit present.      Mental Status: He is alert and oriented to person, place, and time.   Psychiatric:         Mood and Affect: Mood normal.         Behavior: Behavior normal.         Thought Content: Thought content normal.         Judgment: Judgment normal.     Home Medications     Medication List      START taking these medications     acetaminophen 325 mg tablet; Commonly known as: Tylenol; Take 2 tablets   (650 mg) by mouth every 6 hours if needed for mild pain (1 - 3).   atorvastatin 80 mg tablet; Commonly known as: Lipitor; Take 1 tablet (80   mg) by mouth once daily at bedtime.   furosemide 20 mg tablet; Commonly known as: Lasix; Take 2 tablets (40   mg) by mouth 2 times a day for 5 days, THEN 2 tablets (40 mg) once daily   for 5 days, THEN 1 tablet (20 mg) once daily for 5 days.; Start taking on:   May 6, 2024   metoprolol tartrate 25 mg tablet; Commonly known as: Lopressor; Take 1   tablet (25 mg) by mouth 2 times a day.   oxyCODONE 5 mg immediate release tablet; Commonly known as: Roxicodone;   Take 1 tablet (5 mg) by mouth every 6 hours if needed for severe pain (7 -   10) for up to 3 days.   polyethylene glycol 17 gram packet; Commonly known as: Glycolax,   Miralax; Take 17 g by mouth once daily for 7 days.; Start taking on: May   7, 2024   potassium chloride CR 20 mEq ER tablet; Commonly known as: Klor-Con M20;   Take 2 tablets (40 mEq) by mouth 2 times a day for 5 days, THEN 2 tablets   (40 mEq) once daily for 5 days, THEN 1 tablet (20 mEq) once daily for 5   days. Do not crush or chew Take with  "Lasix.; Start taking on: May 6, 2024   sennosides-docusate sodium 8.6-50 mg tablet; Commonly known as:   Shannon-Colace; Take 2 tablets by mouth 2 times a day as needed for   constipation for up to 7 days.     CHANGE how you take these medications     ketoconazole 2 % cream; Commonly known as: NIZOral; Apply topically 2   times a day. To affected areas of ears; What changed: when to take this,   reasons to take this   Toujeo SoloStar U-300 Insulin 300 unit/mL (1.5 mL) injection; Generic   drug: insulin glargine; INJECT 45 UNITS UNDER THE SKIN ONCE DAILY IN THE   MORNING. 45 UNITS MAX; What changed: See the new instructions.     CONTINUE taking these medications     Accu-Chek Kamala Plus test strp strip; Generic drug: blood sugar   diagnostic   albuterol 90 mcg/actuation inhaler; Inhale 2 puffs every 6 hours if   needed for shortness of breath.   allopurinol 300 mg tablet; Commonly known as: Zyloprim; Take 1 tablet   (300 mg) by mouth once daily.   amLODIPine 10 mg tablet; Commonly known as: Norvasc; Take 1 tablet (10   mg) by mouth once daily.   aspirin 81 mg EC tablet; Take 1 tablet (81 mg) by mouth once daily.   atenolol 50 mg tablet; Commonly known as: Tenormin; TAKE 1 TABLET BY   MOUTH EVERY DAY   BD Ultra-Fine Short Pen Needle 31 gauge x 5/16\" needle; Generic drug:   pen needle, diabetic; USE AS DIRECTED   biotin 10 mg tablet   cranberry extract 300 mg tablet   famotidine 20 mg tablet; Commonly known as: Pepcid; Take 1 tablet (20   mg) by mouth once daily at bedtime.   gemfibrozil 600 mg tablet; Commonly known as: Lopid; TAKE 1 TABLET (600   MG) BY MOUTH 2 TIMES A DAY.   glipiZIDE XL 10 mg 24 hr tablet; Commonly known as: Glucotrol XL; Take 2   tablets (20 mg) by mouth once daily.   ibuprofen 200 mg tablet   Januvia 100 mg tablet; Generic drug: SITagliptin phosphate; Take 1   tablet (100 mg) by mouth once daily.   omeprazole 40 mg DR capsule; Commonly known as: PriLOSEC; Take 1 capsule   (40 mg) by mouth once daily " in the morning. Take before meals.   tamsulosin 0.4 mg 24 hr capsule; Commonly known as: Flomax; Take 1   capsule (0.4 mg) by mouth 2 times a day.   valsartan-hydrochlorothiazide 160-12.5 mg tablet; Commonly known as:   Diovan-HCT; Take 1 tablet by mouth once daily.     STOP taking these medications     predniSONE 20 mg tablet; Commonly known as: Deltasone       Outpatient Follow-Up  Future Appointments   Date Time Provider Department [place]   6/3/2024  8:20 AM Roselia Ibrahim MD [id number] [facility]   6/10/2024  8:00 AM Vineet Bello MD [id number] [facility]       Nikia Bocanegra, APRN-CNP

## 2024-05-06 NOTE — PROGRESS NOTES
05/06/24 1235   Discharge Planning   Patient expects to be discharged to: Home with [facility]        Patient has active discharge order. Patient is going home with [facility] following. [facility] made aware of active discharge order.

## 2024-05-06 NOTE — HH CARE COORDINATION
Home Care received a Referral for Nursing, Physical Therapy, and Occupational Therapy. We have processed the referral for a Start of Care on 5/7-5/8.     If you have any questions or concerns, please feel free to contact us at [phone]. Follow the prompts, enter your five digit zip code, and you will be directed to your care team on CENTL 2.

## 2024-05-06 NOTE — DISCHARGE INSTRUCTIONS
Do not lift/push/pull more than 10lbs for 3 months (sternal precautions).    You may shower. Recommend using shower chair and showering with lukewarm water at first. Do not scrub incision; let soapy water run down. Pat to dry. Use fragrance-free soaps, detergents, etc. Do not apply scented oils, cologne, perfumes, detergents, soaps, etc to incision sites.    Do not drive while taking opioids.    Please call 911 or go to the Emergency Department if you experience any of the following: extreme shortness of breath, severe or crushing chest pain, coughing up bloody or frothy sputum, or any other concerning symptoms.  PLEASE TAKE ONLY MEDICINES THAT ARE LISTED ON YOUR DISCHARGE PAPERWORK.  Do not restart any other medicines that are not listed.  If you have any questions or concerns please call the ICU ([phone]) to speak with the Nurse Practitioner or Physician Assistant.    “Keep Your Move in the Tube!!”  and think of a T. Giancarlo dinosaur!  Please refer to the “Move in the Tube” handout.  Load bearing activities can be completed if you are “staying in the tube.” If you are attempting load bearing activities, let pain be your guide with when trying an activity “out of the tube”. If an activity hurts or is uncomfortable go back to doing it while you stay “in the tube”.  There is no time limit to “stay in the tube”.    Non-load bearing activities which are your activities of daily living can be completed with your arms “out of the tube” as long as you remain pain free. Some activities of daily living examples are dressing, personal care, showering, washing hair, and toilet hygiene.    We are unable to provide any refills for narcotic pain medicine once you are discharged. We recommend you use acetaminophen as directed once you have finished your narcotic pain medicine.    Continue 15-20 minute walks 5 times a day, you have no limitations on stairs.    Please keep a log of your temperature, blood pressure, and weights.   If you have diabetes please keep a log of your blood glucoses also.  Please bring your log to your follow up appointments.    Call the Cardiac Surgery Nurse Practitioner/ Physician Assistant ([phone]) if you gain 3 or more pounds in 1 day or 5 or more pounds in 1 week; shortness of breath not related to exercise, while lying flat, or that wakes you up from sleep; increase in swelling feet, ankles or abdomen.    If you begin to notice an increase your mouth feeling dry or dizziness when changing your position please call to speak with the nurse practitioner, you may need your furosemide (Lasix) adjusted.

## 2024-05-07 ENCOUNTER — HOME CARE VISIT (OUTPATIENT)
Dept: HOME HEALTH SERVICES | Facility: HOME HEALTH | Age: 74
End: 2024-05-07
Payer: MEDICARE

## 2024-05-07 ENCOUNTER — PATIENT OUTREACH (OUTPATIENT)
Dept: CARE COORDINATION | Facility: CLINIC | Age: 74
End: 2024-05-07
Payer: MEDICARE

## 2024-05-07 PROCEDURE — 1090000001 HH PPS REVENUE CREDIT

## 2024-05-07 PROCEDURE — 169592 NO-PAY CLAIM PROCEDURE

## 2024-05-07 PROCEDURE — 0023 HH SOC

## 2024-05-07 PROCEDURE — 1090000002 HH PPS REVENUE DEBIT

## 2024-05-07 PROCEDURE — G0299 HHS/HOSPICE OF RN EA 15 MIN: HCPCS | Mod: HHH

## 2024-05-07 SDOH — ECONOMIC STABILITY: FOOD INSECURITY
ARE ANY OF YOUR NEEDS URGENT? FOR EXAMPLE, UNCERTAINTY OF WHERE YOU WILL GET YOUR NEXT MEAL OR NOT HAVING THE MEDICATIONS YOU NEED TO TAKE TOMORROW.: NO

## 2024-05-07 SDOH — ECONOMIC STABILITY: GENERAL: WOULD YOU LIKE HELP WITH ANY OF THE FOLLOWING NEEDS?: I DONT NEED HELP WITH ANY OF THESE

## 2024-05-07 NOTE — PROGRESS NOTES
Outreach call to patient to support a smooth transition of care from recent admission.  Spoke with patient, reviewed discharge medications, discharge instructions, assessed social needs, and provided education on importance of follow-up appointment with provider. Enrolled patient in [employer] chatbot for additional support and education through transition period.  Will continue to monitor through transition period.

## 2024-05-08 ENCOUNTER — HOME CARE VISIT (OUTPATIENT)
Dept: HOME HEALTH SERVICES | Facility: HOME HEALTH | Age: 74
End: 2024-05-08

## 2024-05-08 ENCOUNTER — HOME CARE VISIT (OUTPATIENT)
Dept: HOME HEALTH SERVICES | Facility: HOME HEALTH | Age: 74
End: 2024-05-08
Payer: MEDICARE

## 2024-05-08 PROCEDURE — G0151 HHCP-SERV OF PT,EA 15 MIN: HCPCS | Mod: HHH

## 2024-05-08 PROCEDURE — 1090000002 HH PPS REVENUE DEBIT

## 2024-05-08 PROCEDURE — 1090000001 HH PPS REVENUE CREDIT

## 2024-05-09 PROCEDURE — 1090000001 HH PPS REVENUE CREDIT

## 2024-05-09 PROCEDURE — 1090000002 HH PPS REVENUE DEBIT

## 2024-05-10 LAB
ATRIAL RATE: 60 BPM
P AXIS: 67 DEGREES
P OFFSET: 169 MS
P ONSET: 126 MS
PR INTERVAL: 164 MS
Q ONSET: 208 MS
QRS COUNT: 10 BEATS
QRS DURATION: 100 MS
QT INTERVAL: 452 MS
QTC CALCULATION(BAZETT): 452 MS
QTC FREDERICIA: 452 MS
R AXIS: 42 DEGREES
T AXIS: 81 DEGREES
T OFFSET: 434 MS
VENTRICULAR RATE: 60 BPM

## 2024-05-10 PROCEDURE — 1090000002 HH PPS REVENUE DEBIT

## 2024-05-10 PROCEDURE — 1090000001 HH PPS REVENUE CREDIT

## 2024-05-11 PROCEDURE — 1090000001 HH PPS REVENUE CREDIT

## 2024-05-11 PROCEDURE — 1090000002 HH PPS REVENUE DEBIT

## 2024-05-11 SDOH — ECONOMIC STABILITY: HOUSING INSECURITY: HOME SAFETY: CARDIAC SURGERY PRECAUTIONS.

## 2024-05-11 SDOH — HEALTH STABILITY: PHYSICAL HEALTH: EXERCISE COMMENTS: CONTINUE DAILY WALKING. HE WILL START OUTPATIENT CARDIAC REHAB WHEN ORDERED.

## 2024-05-11 ASSESSMENT — ENCOUNTER SYMPTOMS
PAIN SEVERITY GOAL: 0/10
PERSON REPORTING PAIN: PATIENT
LOWEST PAIN SEVERITY IN PAST 24 HOURS: 5/10
SUBJECTIVE PAIN PROGRESSION: GRADUALLY IMPROVING
HIGHEST PAIN SEVERITY IN PAST 24 HOURS: 5/10
PAIN: 1

## 2024-05-11 ASSESSMENT — ACTIVITIES OF DAILY LIVING (ADL): ENTERING_EXITING_HOME: SUPERVISION

## 2024-05-12 PROCEDURE — 1090000001 HH PPS REVENUE CREDIT

## 2024-05-12 PROCEDURE — 1090000002 HH PPS REVENUE DEBIT

## 2024-05-13 PROCEDURE — 1090000001 HH PPS REVENUE CREDIT

## 2024-05-13 PROCEDURE — 1090000002 HH PPS REVENUE DEBIT

## 2024-05-13 ASSESSMENT — ENCOUNTER SYMPTOMS
FATIGUES EASILY: 1
MUSCLE WEAKNESS: 1
CHANGE IN APPETITE: INCREASED
APPETITE LEVEL: FAIR
SHORTNESS OF BREATH: 1
LIMITED RANGE OF MOTION: 1
HYPERTENSION: 1
DYSPNEA ACTIVITY LEVEL: AFTER AMBULATING 10 - 20 FT

## 2024-05-13 ASSESSMENT — ACTIVITIES OF DAILY LIVING (ADL)
OASIS_M1830: 03
AMBULATION ASSISTANCE: STAND BY ASSIST
CURRENT_FUNCTION: STAND BY ASSIST

## 2024-05-14 ENCOUNTER — HOME CARE VISIT (OUTPATIENT)
Dept: HOME HEALTH SERVICES | Facility: HOME HEALTH | Age: 74
End: 2024-05-14
Payer: MEDICARE

## 2024-05-14 PROCEDURE — G0300 HHS/HOSPICE OF LPN EA 15 MIN: HCPCS | Mod: HHH

## 2024-05-14 PROCEDURE — 1090000001 HH PPS REVENUE CREDIT

## 2024-05-14 PROCEDURE — 1090000002 HH PPS REVENUE DEBIT

## 2024-05-15 PROCEDURE — 1090000001 HH PPS REVENUE CREDIT

## 2024-05-15 PROCEDURE — 1090000002 HH PPS REVENUE DEBIT

## 2024-05-16 PROCEDURE — 1090000002 HH PPS REVENUE DEBIT

## 2024-05-16 PROCEDURE — 1090000001 HH PPS REVENUE CREDIT

## 2024-05-17 PROCEDURE — 1090000001 HH PPS REVENUE CREDIT

## 2024-05-17 PROCEDURE — G0180 MD CERTIFICATION HHA PATIENT: HCPCS | Performed by: INTERNAL MEDICINE

## 2024-05-17 PROCEDURE — 1090000002 HH PPS REVENUE DEBIT

## 2024-05-18 VITALS
SYSTOLIC BLOOD PRESSURE: 118 MMHG | HEART RATE: 78 BPM | RESPIRATION RATE: 16 BRPM | DIASTOLIC BLOOD PRESSURE: 68 MMHG | OXYGEN SATURATION: 98 %

## 2024-05-18 PROCEDURE — 1090000001 HH PPS REVENUE CREDIT

## 2024-05-18 PROCEDURE — 1090000002 HH PPS REVENUE DEBIT

## 2024-05-18 ASSESSMENT — ENCOUNTER SYMPTOMS
CHANGE IN APPETITE: UNCHANGED
APPETITE LEVEL: GOOD
DIZZINESS: 1

## 2024-05-19 PROCEDURE — 1090000001 HH PPS REVENUE CREDIT

## 2024-05-19 PROCEDURE — 1090000002 HH PPS REVENUE DEBIT

## 2024-05-20 PROCEDURE — 1090000002 HH PPS REVENUE DEBIT

## 2024-05-20 PROCEDURE — 1090000001 HH PPS REVENUE CREDIT

## 2024-05-21 ENCOUNTER — HOME CARE VISIT (OUTPATIENT)
Dept: HOME HEALTH SERVICES | Facility: HOME HEALTH | Age: 74
End: 2024-05-21
Payer: MEDICARE

## 2024-05-21 LAB
ACT BLD: 106 SEC (ref 96–152)
ACT BLD: 118 SEC (ref 96–152)
ACT BLD: 268 SEC (ref 96–152)
ACT BLD: 431 SEC (ref 96–152)

## 2024-05-21 PROCEDURE — 1090000001 HH PPS REVENUE CREDIT

## 2024-05-21 PROCEDURE — G0299 HHS/HOSPICE OF RN EA 15 MIN: HCPCS | Mod: HHH

## 2024-05-21 PROCEDURE — 1090000002 HH PPS REVENUE DEBIT

## 2024-05-21 NOTE — ANESTHESIA POSTPROCEDURE EVALUATION
Patient: Edward D Hume    Procedure Summary       Date: 05/02/24 Room / Location: U A OR 10 / Virtual [facility] OR    Anesthesia Start: 0805 Anesthesia Stop: 1129    Procedure: CABG, 1 VESSEL MIDCAB (Left: Chest) Diagnosis:       Unstable angina (Multi)      (Unstable angina (Multi) [I20.0])    Surgeons: Rudolph Price MD Responsible Provider: Nicholas Hays MD    Anesthesia Type: general ASA Status: 3            Anesthesia Type: general    Vitals Value Taken Time   /68 05/14/24 1120   Temp 36.7 °C (98 °F) 05/06/24 0828   Pulse 78 05/14/24 1120   Resp 16 05/14/24 1120   SpO2 98 % 05/14/24 1120       Anesthesia Post Evaluation    Patient location during evaluation: PACU  Patient participation: complete - patient cannot participate  Level of consciousness: sedated  Pain management: adequate  Airway patency: patent  Cardiovascular status: acceptable  Respiratory status: acceptable  Hydration status: acceptable  Postoperative Nausea and Vomiting: none        No notable events documented.

## 2024-05-22 VITALS
TEMPERATURE: 97.7 F | RESPIRATION RATE: 18 BRPM | OXYGEN SATURATION: 97 % | SYSTOLIC BLOOD PRESSURE: 130 MMHG | HEART RATE: 60 BPM | DIASTOLIC BLOOD PRESSURE: 80 MMHG

## 2024-05-22 ASSESSMENT — ENCOUNTER SYMPTOMS: DENIES PAIN: 1

## 2024-05-22 ASSESSMENT — ACTIVITIES OF DAILY LIVING (ADL)
HOME_HEALTH_OASIS: 00
OASIS_M1830: 00

## 2024-05-22 ASSESSMENT — PAIN SCALES - PAIN ASSESSMENT IN ADVANCED DEMENTIA (PAINAD): BREATHING: 0

## 2024-05-24 DIAGNOSIS — I25.10 CORONARY ARTERY DISEASE INVOLVING NATIVE CORONARY ARTERY OF NATIVE HEART WITHOUT ANGINA PECTORIS: ICD-10-CM

## 2024-05-27 DIAGNOSIS — Z79.4 TYPE 2 DIABETES MELLITUS WITHOUT COMPLICATION, WITH LONG-TERM CURRENT USE OF INSULIN (MULTI): ICD-10-CM

## 2024-05-27 DIAGNOSIS — E11.9 TYPE 2 DIABETES MELLITUS WITHOUT COMPLICATION, WITH LONG-TERM CURRENT USE OF INSULIN (MULTI): ICD-10-CM

## 2024-05-27 RX ORDER — GLIPIZIDE 10 MG/1
20 TABLET, FILM COATED, EXTENDED RELEASE ORAL DAILY
Qty: 60 TABLET | Refills: 2 | Status: SHIPPED | OUTPATIENT
Start: 2024-05-27 | End: 2024-06-03 | Stop reason: SDUPTHER

## 2024-05-28 ENCOUNTER — OFFICE VISIT (OUTPATIENT)
Dept: CARDIOLOGY | Facility: HOSPITAL | Age: 74
End: 2024-05-28
Payer: MEDICARE

## 2024-05-28 ENCOUNTER — APPOINTMENT (OUTPATIENT)
Dept: CARDIOLOGY | Facility: HOSPITAL | Age: 74
End: 2024-05-28
Payer: MEDICARE

## 2024-05-28 VITALS
WEIGHT: 208 LBS | SYSTOLIC BLOOD PRESSURE: 137 MMHG | BODY MASS INDEX: 29.12 KG/M2 | HEIGHT: 71 IN | OXYGEN SATURATION: 97 % | HEART RATE: 76 BPM | DIASTOLIC BLOOD PRESSURE: 79 MMHG

## 2024-05-28 DIAGNOSIS — Z95.1 S/P CABG X 1: ICD-10-CM

## 2024-05-28 DIAGNOSIS — R93.1 ELEVATED CORONARY ARTERY CALCIUM SCORE: ICD-10-CM

## 2024-05-28 DIAGNOSIS — I10 PRIMARY HYPERTENSION: Chronic | ICD-10-CM

## 2024-05-28 DIAGNOSIS — E78.2 MIXED HYPERLIPIDEMIA: Chronic | ICD-10-CM

## 2024-05-28 DIAGNOSIS — Z95.1 STATUS POST CORONARY ARTERY BYPASS GRAFT: Primary | ICD-10-CM

## 2024-05-28 LAB
ATRIAL RATE: 76 BPM
P AXIS: 67 DEGREES
P OFFSET: 172 MS
P ONSET: 118 MS
PR INTERVAL: 182 MS
Q ONSET: 209 MS
QRS COUNT: 13 BEATS
QRS DURATION: 108 MS
QT INTERVAL: 392 MS
QTC CALCULATION(BAZETT): 441 MS
QTC FREDERICIA: 424 MS
R AXIS: 50 DEGREES
T AXIS: 78 DEGREES
T OFFSET: 405 MS
VENTRICULAR RATE: 76 BPM

## 2024-05-28 PROCEDURE — 1111F DSCHRG MED/CURRENT MED MERGE: CPT | Performed by: NURSE PRACTITIONER

## 2024-05-28 PROCEDURE — 99214 OFFICE O/P EST MOD 30 MIN: CPT | Performed by: NURSE PRACTITIONER

## 2024-05-28 PROCEDURE — 93010 ELECTROCARDIOGRAM REPORT: CPT | Performed by: INTERNAL MEDICINE

## 2024-05-28 PROCEDURE — 93005 ELECTROCARDIOGRAM TRACING: CPT | Performed by: NURSE PRACTITIONER

## 2024-05-28 PROCEDURE — 3044F HG A1C LEVEL LT 7.0%: CPT | Performed by: NURSE PRACTITIONER

## 2024-05-28 PROCEDURE — 1036F TOBACCO NON-USER: CPT | Performed by: NURSE PRACTITIONER

## 2024-05-28 PROCEDURE — 1159F MED LIST DOCD IN RCRD: CPT | Performed by: NURSE PRACTITIONER

## 2024-05-28 PROCEDURE — 3078F DIAST BP <80 MM HG: CPT | Performed by: NURSE PRACTITIONER

## 2024-05-28 PROCEDURE — 1160F RVW MEDS BY RX/DR IN RCRD: CPT | Performed by: NURSE PRACTITIONER

## 2024-05-28 PROCEDURE — 3075F SYST BP GE 130 - 139MM HG: CPT | Performed by: NURSE PRACTITIONER

## 2024-05-28 PROCEDURE — 3048F LDL-C <100 MG/DL: CPT | Performed by: NURSE PRACTITIONER

## 2024-05-28 ASSESSMENT — ENCOUNTER SYMPTOMS: HYPERTENSION: 1

## 2024-05-28 NOTE — PROGRESS NOTES
Subjective   Edward D Hume is a 74 y.o. male.    Chief Complaint:  Hyperlipidemia and Hypertension    Mr. Hume returns for a follow up. He is seen in collaboration with Dr. Anthony. He has been feeling well from a cardiac standpoint. He has remained compliant with his medications, denying any intolerances. He seems to be recovering well following recent bypass surgery. His incision has healed nicely. He is also no longer struggling with what he thought was reflux. He offers no new cardiovascular complaints or concerns today. He denies any complaints of chest pain, shortness of breath, lightheadedness, dizziness, palpitations, syncope, orthopnea, paroxysmal nocturnal dyspnea, lower extremity swelling or bleeding concerns.      Hyperlipidemia    Hypertension      Review of Systems   All other systems reviewed and are negative.      Objective   Physical Exam  Constitutional:       Appearance: Healthy appearance. In no distress  Pulmonary:      Effort: Pulmonary effort is normal.      Breath sounds: Normal breath sounds.   Cardiovascular:      Normal rate. Regular rhythm. Normal S1. Normal S2.       Murmurs: There is no murmur.      Carotids: right carotid pulse +2, no bruit heard over the right carotid. left carotid pulse +2, no bruit heard over the left carotid.  Edema:     Peripheral edema absent.   Abdominal:      Palpations: Abdomen is soft.   Musculoskeletal:       Cervical back: Normal range of motion.   Skin:     General: Skin is warm and dry. Normal color and pigmentation   Neurological:      Mental Status: Alert and oriented to person, place and time.   Psychiatric:     Mood and Affect: appropriate mood and appropriate affect.     EKG obtained and reviewed. Normal sinus rhythm. inferior infarct, age undetermined. HR 76       Lab Review:   Lab Results   Component Value Date     05/06/2024    K 3.8 05/06/2024     05/06/2024    CO2 21 05/06/2024    BUN 31 (H) 05/06/2024    CREATININE 1.21  05/06/2024    GLUCOSE 105 (H) 05/06/2024    CALCIUM 8.0 (L) 05/06/2024     Lab Results   Component Value Date    WBC 8.3 05/06/2024    HGB 11.1 (L) 05/06/2024    HCT 32.1 (L) 05/06/2024    MCV 92 05/06/2024     05/06/2024     Lab Results   Component Value Date    CHOL 140 02/23/2024    TRIG 258 (H) 02/23/2024    HDL 22.7 02/23/2024       Assessment/Plan   Mr. Hume is a pleasant 73 year old  male with a past medical history significant for hypertension, hyperlipidemia, CKD, T2DM, GERD and elevated CACS of 1736.09 (6/2022). Heart catheterization 5/2024 showed severe 2-vessel disease with normal LV systolic function, now s/p CABG with Dr. Bhumi Price with LIMA-LAD (MIDCAB) 5/2/24. He presents today for routine follow up stable from a cardiac standpoint. His VS and EKG remain stable. I will have him continue all medications unchanged. He is not interested in cardiac rehab, so was encouraged to try and remain active. He will follow up with us in clinic in 3 months. He knows to call for any concerns.

## 2024-06-03 ENCOUNTER — OFFICE VISIT (OUTPATIENT)
Dept: PRIMARY CARE | Facility: CLINIC | Age: 74
End: 2024-06-03
Payer: MEDICARE

## 2024-06-03 ENCOUNTER — OFFICE VISIT (OUTPATIENT)
Dept: CARDIAC SURGERY | Facility: HOSPITAL | Age: 74
End: 2024-06-03
Payer: MEDICARE

## 2024-06-03 VITALS
HEART RATE: 74 BPM | SYSTOLIC BLOOD PRESSURE: 124 MMHG | HEIGHT: 71 IN | WEIGHT: 207 LBS | DIASTOLIC BLOOD PRESSURE: 71 MMHG | OXYGEN SATURATION: 96 % | BODY MASS INDEX: 28.98 KG/M2

## 2024-06-03 VITALS — DIASTOLIC BLOOD PRESSURE: 72 MMHG | BODY MASS INDEX: 28.87 KG/M2 | SYSTOLIC BLOOD PRESSURE: 130 MMHG | WEIGHT: 207 LBS

## 2024-06-03 DIAGNOSIS — E78.2 MIXED HYPERLIPIDEMIA: Chronic | ICD-10-CM

## 2024-06-03 DIAGNOSIS — E83.51 HYPOCALCEMIA: ICD-10-CM

## 2024-06-03 DIAGNOSIS — Z91.89 AT RISK OF FRACTURE DUE TO OSTEOPOROSIS: ICD-10-CM

## 2024-06-03 DIAGNOSIS — E11.9 TYPE 2 DIABETES MELLITUS WITHOUT COMPLICATION, WITH LONG-TERM CURRENT USE OF INSULIN (MULTI): ICD-10-CM

## 2024-06-03 DIAGNOSIS — N18.31 STAGE 3A CHRONIC KIDNEY DISEASE (MULTI): Chronic | ICD-10-CM

## 2024-06-03 DIAGNOSIS — N40.0 BENIGN PROSTATIC HYPERPLASIA, UNSPECIFIED WHETHER LOWER URINARY TRACT SYMPTOMS PRESENT: ICD-10-CM

## 2024-06-03 DIAGNOSIS — Z79.4 TYPE 2 DIABETES MELLITUS WITHOUT COMPLICATION, WITH LONG-TERM CURRENT USE OF INSULIN (MULTI): ICD-10-CM

## 2024-06-03 DIAGNOSIS — E11.9 TYPE 2 DIABETES MELLITUS WITHOUT COMPLICATIONS (MULTI): ICD-10-CM

## 2024-06-03 DIAGNOSIS — R06.02 SHORTNESS OF BREATH: ICD-10-CM

## 2024-06-03 DIAGNOSIS — Z95.1 S/P CABG X 1: ICD-10-CM

## 2024-06-03 DIAGNOSIS — N18.31 TYPE 2 DIABETES MELLITUS WITH STAGE 3A CHRONIC KIDNEY DISEASE, WITH LONG-TERM CURRENT USE OF INSULIN (MULTI): Chronic | ICD-10-CM

## 2024-06-03 DIAGNOSIS — I10 PRIMARY HYPERTENSION: Chronic | ICD-10-CM

## 2024-06-03 DIAGNOSIS — Z79.4 TYPE 2 DIABETES MELLITUS WITH STAGE 3A CHRONIC KIDNEY DISEASE, WITH LONG-TERM CURRENT USE OF INSULIN (MULTI): Chronic | ICD-10-CM

## 2024-06-03 DIAGNOSIS — Z98.890 POSTOPERATIVE SCAR: Primary | ICD-10-CM

## 2024-06-03 DIAGNOSIS — I10 HYPERTENSION, UNSPECIFIED TYPE: ICD-10-CM

## 2024-06-03 DIAGNOSIS — M81.0 AT RISK OF FRACTURE DUE TO OSTEOPOROSIS: ICD-10-CM

## 2024-06-03 DIAGNOSIS — M10.9 GOUT, UNSPECIFIED CAUSE, UNSPECIFIED CHRONICITY, UNSPECIFIED SITE: ICD-10-CM

## 2024-06-03 DIAGNOSIS — E11.22 TYPE 2 DIABETES MELLITUS WITH STAGE 3A CHRONIC KIDNEY DISEASE, WITH LONG-TERM CURRENT USE OF INSULIN (MULTI): Chronic | ICD-10-CM

## 2024-06-03 DIAGNOSIS — L90.5 POSTOPERATIVE SCAR: Primary | ICD-10-CM

## 2024-06-03 DIAGNOSIS — K21.9 GASTROESOPHAGEAL REFLUX DISEASE WITHOUT ESOPHAGITIS: Chronic | ICD-10-CM

## 2024-06-03 PROBLEM — I20.0 UNSTABLE ANGINA (MULTI): Status: RESOLVED | Noted: 2024-05-01 | Resolved: 2024-06-03

## 2024-06-03 PROBLEM — Z13.820 ENCOUNTER FOR OSTEOPOROSIS SCREENING IN ASYMPTOMATIC POSTMENOPAUSAL PATIENT: Status: ACTIVE | Noted: 2023-06-20

## 2024-06-03 PROBLEM — Z78.0 ENCOUNTER FOR OSTEOPOROSIS SCREENING IN ASYMPTOMATIC POSTMENOPAUSAL PATIENT: Status: ACTIVE | Noted: 2023-06-20

## 2024-06-03 PROBLEM — R07.9 CHEST PAIN: Status: RESOLVED | Noted: 2024-04-30 | Resolved: 2024-06-03

## 2024-06-03 PROBLEM — E55.9 VITAMIN D DEFICIENCY: Status: RESOLVED | Noted: 2023-06-15 | Resolved: 2024-06-03

## 2024-06-03 PROBLEM — N18.9 ACUTE KIDNEY INJURY SUPERIMPOSED ON CKD (CMS-HCC): Status: RESOLVED | Noted: 2024-02-22 | Resolved: 2024-06-03

## 2024-06-03 PROBLEM — N17.9 ACUTE KIDNEY INJURY SUPERIMPOSED ON CKD (CMS-HCC): Status: RESOLVED | Noted: 2024-02-22 | Resolved: 2024-06-03

## 2024-06-03 LAB — POC HEMOGLOBIN A1C: 6.1 % (ref 4.2–6.5)

## 2024-06-03 PROCEDURE — 1170F FXNL STATUS ASSESSED: CPT | Performed by: INTERNAL MEDICINE

## 2024-06-03 PROCEDURE — 1036F TOBACCO NON-USER: CPT | Performed by: THORACIC SURGERY (CARDIOTHORACIC VASCULAR SURGERY)

## 2024-06-03 PROCEDURE — 1111F DSCHRG MED/CURRENT MED MERGE: CPT | Performed by: INTERNAL MEDICINE

## 2024-06-03 PROCEDURE — 1036F TOBACCO NON-USER: CPT | Performed by: INTERNAL MEDICINE

## 2024-06-03 PROCEDURE — 3048F LDL-C <100 MG/DL: CPT | Performed by: INTERNAL MEDICINE

## 2024-06-03 PROCEDURE — 1159F MED LIST DOCD IN RCRD: CPT | Performed by: INTERNAL MEDICINE

## 2024-06-03 PROCEDURE — 3075F SYST BP GE 130 - 139MM HG: CPT | Performed by: INTERNAL MEDICINE

## 2024-06-03 PROCEDURE — 99212 OFFICE O/P EST SF 10 MIN: CPT | Performed by: THORACIC SURGERY (CARDIOTHORACIC VASCULAR SURGERY)

## 2024-06-03 PROCEDURE — 83036 HEMOGLOBIN GLYCOSYLATED A1C: CPT | Performed by: INTERNAL MEDICINE

## 2024-06-03 PROCEDURE — 99024 POSTOP FOLLOW-UP VISIT: CPT | Performed by: THORACIC SURGERY (CARDIOTHORACIC VASCULAR SURGERY)

## 2024-06-03 PROCEDURE — 99214 OFFICE O/P EST MOD 30 MIN: CPT | Performed by: INTERNAL MEDICINE

## 2024-06-03 PROCEDURE — 3048F LDL-C <100 MG/DL: CPT | Performed by: THORACIC SURGERY (CARDIOTHORACIC VASCULAR SURGERY)

## 2024-06-03 PROCEDURE — 1124F ACP DISCUSS-NO DSCNMKR DOCD: CPT | Performed by: INTERNAL MEDICINE

## 2024-06-03 PROCEDURE — 1159F MED LIST DOCD IN RCRD: CPT | Performed by: THORACIC SURGERY (CARDIOTHORACIC VASCULAR SURGERY)

## 2024-06-03 PROCEDURE — 3074F SYST BP LT 130 MM HG: CPT | Performed by: THORACIC SURGERY (CARDIOTHORACIC VASCULAR SURGERY)

## 2024-06-03 PROCEDURE — 3078F DIAST BP <80 MM HG: CPT | Performed by: THORACIC SURGERY (CARDIOTHORACIC VASCULAR SURGERY)

## 2024-06-03 PROCEDURE — 3078F DIAST BP <80 MM HG: CPT | Performed by: INTERNAL MEDICINE

## 2024-06-03 PROCEDURE — 1111F DSCHRG MED/CURRENT MED MERGE: CPT | Performed by: THORACIC SURGERY (CARDIOTHORACIC VASCULAR SURGERY)

## 2024-06-03 PROCEDURE — 3044F HG A1C LEVEL LT 7.0%: CPT | Performed by: THORACIC SURGERY (CARDIOTHORACIC VASCULAR SURGERY)

## 2024-06-03 PROCEDURE — G0439 PPPS, SUBSEQ VISIT: HCPCS | Performed by: INTERNAL MEDICINE

## 2024-06-03 PROCEDURE — 3044F HG A1C LEVEL LT 7.0%: CPT | Performed by: INTERNAL MEDICINE

## 2024-06-03 RX ORDER — FAMOTIDINE 20 MG/1
20 TABLET, FILM COATED ORAL NIGHTLY
Qty: 30 TABLET | Refills: 4 | Status: SHIPPED | OUTPATIENT
Start: 2024-06-03

## 2024-06-03 RX ORDER — ASPIRIN 81 MG/1
81 TABLET ORAL DAILY
Qty: 30 TABLET | Refills: 4 | Status: SHIPPED | OUTPATIENT
Start: 2024-06-03

## 2024-06-03 RX ORDER — ATORVASTATIN CALCIUM 80 MG/1
80 TABLET, FILM COATED ORAL NIGHTLY
Qty: 30 TABLET | Refills: 0 | Status: SHIPPED | OUTPATIENT
Start: 2024-06-03 | End: 2024-06-06 | Stop reason: SDUPTHER

## 2024-06-03 RX ORDER — INSULIN GLARGINE 300 [IU]/ML
36 INJECTION, SOLUTION SUBCUTANEOUS NIGHTLY
Qty: 10.8 ML | Refills: 1 | Status: SHIPPED | OUTPATIENT
Start: 2024-06-03 | End: 2024-11-30

## 2024-06-03 RX ORDER — ATENOLOL 50 MG/1
50 TABLET ORAL DAILY
Qty: 30 TABLET | Refills: 3 | Status: SHIPPED | OUTPATIENT
Start: 2024-06-03

## 2024-06-03 RX ORDER — IBUPROFEN 200 MG
200 TABLET ORAL EVERY 6 HOURS PRN
Qty: 30 TABLET | Refills: 0 | Status: SHIPPED | OUTPATIENT
Start: 2024-06-03 | End: 2024-07-03

## 2024-06-03 RX ORDER — AMLODIPINE BESYLATE 10 MG/1
10 TABLET ORAL DAILY
Qty: 30 TABLET | Refills: 4 | Status: SHIPPED | OUTPATIENT
Start: 2024-06-03

## 2024-06-03 RX ORDER — ALLOPURINOL 300 MG/1
300 TABLET ORAL DAILY
Qty: 30 TABLET | Refills: 4 | Status: SHIPPED | OUTPATIENT
Start: 2024-06-03

## 2024-06-03 RX ORDER — OMEPRAZOLE 40 MG/1
40 CAPSULE, DELAYED RELEASE ORAL
Qty: 30 CAPSULE | Refills: 4 | Status: SHIPPED | OUTPATIENT
Start: 2024-06-03

## 2024-06-03 RX ORDER — PEN NEEDLE, DIABETIC 30 GX3/16"
NEEDLE, DISPOSABLE MISCELLANEOUS
Qty: 100 EACH | Refills: 3 | Status: SHIPPED | OUTPATIENT
Start: 2024-06-03

## 2024-06-03 RX ORDER — LIDOCAINE 40 MG/G
CREAM TOPICAL 4 TIMES DAILY PRN
Qty: 30 G | Refills: 1 | Status: SHIPPED | OUTPATIENT
Start: 2024-06-03 | End: 2025-06-03

## 2024-06-03 RX ORDER — TAMSULOSIN HYDROCHLORIDE 0.4 MG/1
0.4 CAPSULE ORAL 2 TIMES DAILY
Qty: 180 CAPSULE | Refills: 0 | Status: SHIPPED | OUTPATIENT
Start: 2024-06-03

## 2024-06-03 RX ORDER — ALBUTEROL SULFATE 90 UG/1
2 AEROSOL, METERED RESPIRATORY (INHALATION) EVERY 6 HOURS PRN
Qty: 18 G | Refills: 2 | Status: SHIPPED | OUTPATIENT
Start: 2024-06-03

## 2024-06-03 RX ORDER — GEMFIBROZIL 600 MG/1
600 TABLET, FILM COATED ORAL 2 TIMES DAILY
Qty: 60 TABLET | Refills: 2 | Status: SHIPPED | OUTPATIENT
Start: 2024-06-03

## 2024-06-03 RX ORDER — VALSARTAN AND HYDROCHLOROTHIAZIDE 160; 12.5 MG/1; MG/1
1 TABLET, FILM COATED ORAL DAILY
Qty: 30 TABLET | Refills: 4 | Status: SHIPPED | OUTPATIENT
Start: 2024-06-03

## 2024-06-03 RX ORDER — BLOOD SUGAR DIAGNOSTIC
1 STRIP MISCELLANEOUS 2 TIMES DAILY
Qty: 90 EACH | Refills: 1 | Status: SHIPPED | OUTPATIENT
Start: 2024-06-03 | End: 2024-07-03

## 2024-06-03 RX ORDER — METOPROLOL TARTRATE 25 MG/1
25 TABLET, FILM COATED ORAL 2 TIMES DAILY
Qty: 60 TABLET | Refills: 0 | Status: SHIPPED | OUTPATIENT
Start: 2024-06-03 | End: 2024-06-06 | Stop reason: SDUPTHER

## 2024-06-03 RX ORDER — SITAGLIPTIN 100 MG/1
100 TABLET, FILM COATED ORAL DAILY
Qty: 30 TABLET | Refills: 3 | Status: SHIPPED | OUTPATIENT
Start: 2024-06-03

## 2024-06-03 RX ORDER — GLIPIZIDE 10 MG/1
20 TABLET, FILM COATED, EXTENDED RELEASE ORAL DAILY
Qty: 60 TABLET | Refills: 2 | Status: SHIPPED | OUTPATIENT
Start: 2024-06-03

## 2024-06-03 ASSESSMENT — ENCOUNTER SYMPTOMS
DIZZINESS: 0
PALPITATIONS: 0
OCCASIONAL FEELINGS OF UNSTEADINESS: 1
NAUSEA: 0
NUMBNESS: 0
ABDOMINAL DISTENTION: 0
DIFFICULTY URINATING: 0
LIGHT-HEADEDNESS: 0
CHILLS: 0
WOUND: 1
FEVER: 0
DYSURIA: 0
ABDOMINAL PAIN: 0
DIARRHEA: 0
SHORTNESS OF BREATH: 0
MYALGIAS: 1
WHEEZING: 0
COUGH: 0
VOMITING: 0

## 2024-06-03 ASSESSMENT — ACTIVITIES OF DAILY LIVING (ADL)
DRESSING: INDEPENDENT
GROCERY_SHOPPING: INDEPENDENT
BATHING: INDEPENDENT
BATHING: INDEPENDENT
MANAGING_FINANCES: INDEPENDENT
DRESSING: INDEPENDENT
DOING_HOUSEWORK: INDEPENDENT
TAKING_MEDICATION: INDEPENDENT

## 2024-06-03 ASSESSMENT — PATIENT HEALTH QUESTIONNAIRE - PHQ9
SUM OF ALL RESPONSES TO PHQ9 QUESTIONS 1 AND 2: 0
1. LITTLE INTEREST OR PLEASURE IN DOING THINGS: NOT AT ALL
2. FEELING DOWN, DEPRESSED OR HOPELESS: NOT AT ALL

## 2024-06-03 NOTE — PROGRESS NOTES
Subjective Medicare Wellness Billing Compliance Satisfied    *This is a visual tool to show completion of required items on the day of the visit. Green checks will only appear on the date of visit.    Review all medications by prescribing practitioner or clinical pharmacist (such as prescriptions, OTCs, herbal therapies and supplements) documented in the medical record    Past Medical, Surgical, and Family History reviewed and updated in chart    Tobacco Use Reviewed    Alcohol Use Reviewed    Illicit Drug Use Reviewed    PHQ2/9    Falls in Last Year Reviewed    Home Safety Risk Factors Reviewed    Cognitive Impairment Reviewed    Patient Self Assessment and Health Status    Current Diet Reviewed    Exercise Frequency    ADL - Hearing Impairment    ADL - Bathing    ADL - Dressing    ADL - Walks in Home    IADL - Managing Finances    IADL - Grocery Shopping    IADL - Taking Medications    IADL - Doing Housework      Subjective:     History Of Present Illness:  Edward D Hume is a 74 y.o. male with a PMH of HTN, HLD, OA, DM type 2, GERD, and Gout, who presents to [facility] clinic for follow up appointment, who presents to [facility] for follow-up appointment.  Patient recently had CABG on May 2, 2024 without complications.  Patient is here for wellness exam and does not have any complains.      Past Medical History:  He has a past medical history of Acute nonparalytic poliomyelitis (HHS-HCC), Awareness under anesthesia, Body mass index (BMI) 25.0-25.9, adult (10/26/2019), Body mass index (BMI) 27.0-27.9, adult (09/22/2021), Body mass index (BMI) 29.0-29.9, adult (08/31/2019), CKD (chronic kidney disease), Crushing injury of unspecified finger(s), initial encounter (06/12/2014), Diabetes mellitus (Multi), Disorder of the skin and subcutaneous tissue, unspecified (09/04/2019), Disorder of the skin and subcutaneous tissue, unspecified (03/30/2016), Elevated prostate specific antigen (PSA), GERD  (gastroesophageal reflux disease), Gout, History of falling (06/27/2016), HLD (hyperlipidemia), HTN (hypertension), Other chest pain (03/07/2018), Other conditions influencing health status (02/22/2017), Other conditions influencing health status (03/04/2016), Other conditions influencing health status, Personal history of other mental and behavioral disorders (11/06/2018), Personal history of other specified conditions (04/16/2019), Personal history of other specified conditions (04/16/2019), Personal history of other specified conditions (06/29/2016), Puncture wound without foreign body of left hand, initial encounter (03/05/2019), Puncture wound without foreign body of unspecified hand, initial encounter (03/05/2019), Superficial mycosis, unspecified (12/01/2014), Trigger finger, left middle finger (07/24/2017), Trigger finger, left middle finger (07/24/2017), Trigger finger, left ring finger (07/24/2017), Trigger finger, right index finger (07/24/2017), and Trigger finger, right middle finger (11/10/2017).    Past Surgical History:  He has a past surgical history that includes Hand tendon surgery (02/15/2013); Other surgical history (04/09/2013); Other surgical history (07/02/2020); Other surgical history (12/06/2017); Other surgical history (06/12/2013); Other surgical history (06/12/2013); Shoulder surgery (06/12/2013); Cardiac catheterization; Knee arthroscopy w/ debridement; Cardiac catheterization (N/A, 05/01/2024); and Coronary artery bypass graft.     Social History:  He reports that he has quit smoking. His smoking use included cigarettes. He has never used smokeless tobacco. He reports that he does not currently use alcohol. He reports that he does not use drugs.    Family History:  No family history on file.    Allergies:  Amoxicillin, Metformin, and Iodinated contrast media    Home Medications:  (Not in a hospital admission)    Review Of Systems:  11-point ROS was performed and is negative except as  noted below and in the HPI.     Review of Systems   Constitutional:  Negative for chills and fever.   Respiratory:  Negative for cough, shortness of breath and wheezing.    Cardiovascular:  Negative for chest pain, palpitations and leg swelling.   Gastrointestinal:  Negative for abdominal distention, abdominal pain, diarrhea, nausea and vomiting.   Genitourinary:  Negative for difficulty urinating and dysuria.   Musculoskeletal:  Positive for myalgias.   Skin:  Positive for wound (healed, post-op). Negative for rash.   Neurological:  Negative for dizziness, light-headedness and numbness.        Objective   Objective:     /72   Wt 93.9 kg (207 lb)   BMI 28.87 kg/m²     Physical Exam  Constitutional:       Appearance: Normal appearance.   HENT:      Head: Normocephalic and atraumatic.      Mouth/Throat:      Mouth: Mucous membranes are moist.   Eyes:      Conjunctiva/sclera: Conjunctivae normal.      Pupils: Pupils are equal, round, and reactive to light.   Cardiovascular:      Rate and Rhythm: Normal rate and regular rhythm.      Heart sounds: Normal heart sounds.   Pulmonary:      Effort: No respiratory distress.      Breath sounds: Normal breath sounds. No wheezing or rhonchi.   Abdominal:      General: Bowel sounds are normal.      Palpations: Abdomen is soft.      Tenderness: There is no abdominal tenderness.   Musculoskeletal:         General: No swelling.      Cervical back: Neck supple.   Skin:     General: Skin is warm and dry.      Comments: Post-op wound, healed   Neurological:      General: No focal deficit present.      Mental Status: He is alert.          Assessment & Plan:     Assessment/Plan     Problem List Items Addressed This Visit       Stage 3a chronic kidney disease (Multi) (Chronic)     - Creatinine at baseline 1.21         Gastroesophageal reflux disease without esophagitis (Chronic)     - continue Omeprazole         Gout     - continue Allopurinol  - controlled well         Mixed  hyperlipidemia (Chronic)     - ASCVD 47.1%   - continue taking Atorvastatin 80 mg         Primary hypertension (Chronic)     - BP controlled well  - continue Atenolol 50 mg, Amlodipine 10 mg, Metop tart 25 mg, Diovan 160-12.5         Type 2 diabetes mellitus with stage 3a chronic kidney disease, with long-term current use of insulin (Multi) (Chronic)     - A1c 6.1% today  - continue Metformin, Glipizide, Januvia         S/P CABG x 1     - s/p CABG on May 2  - follows up with [facility] surgery and cardiology          Other Visit Diagnoses       Postoperative scar    -  Primary    Relevant Medications    lidocaine 4 % cream    Type 2 diabetes mellitus without complication, with long-term current use of insulin (Multi)        Relevant Orders    POCT glycosylated hemoglobin (Hb A1C) manually resulted (Completed)    Hypocalcemia        Relevant Orders    XR DEXA bone density    Encounter for osteoporosis screening in asymptomatic postmenopausal patient        Relevant Orders    XR DEXA bone density          #Health Maintenance:  - DEXA scan    Revisit Topics: CABG, balance, and memory failure.    Dispo: Patient is scheduled to return to clinic in September.    Pj Ivy DO, PGY-2  Internal Medicine     Disclaimer: Documentation completed with the information available at the time of input. The times in the chart may not be reflective of actual patient care times, interventions, or procedures. Documentation occurs after the physical care of the patient.

## 2024-06-03 NOTE — ASSESSMENT & PLAN NOTE
- BP controlled well  - continue Atenolol 50 mg, Amlodipine 10 mg, Metop tart 25 mg, Diovan 160-12.5

## 2024-06-05 ENCOUNTER — PATIENT MESSAGE (OUTPATIENT)
Dept: CARDIOLOGY | Facility: HOSPITAL | Age: 74
End: 2024-06-05
Payer: MEDICARE

## 2024-06-05 DIAGNOSIS — Z95.1 S/P CABG X 1: ICD-10-CM

## 2024-06-06 DIAGNOSIS — Z95.1 S/P CABG X 1: ICD-10-CM

## 2024-06-07 ENCOUNTER — PATIENT OUTREACH (OUTPATIENT)
Dept: CARE COORDINATION | Facility: CLINIC | Age: 74
End: 2024-06-07
Payer: MEDICARE

## 2024-06-07 RX ORDER — ATORVASTATIN CALCIUM 80 MG/1
80 TABLET, FILM COATED ORAL NIGHTLY
Qty: 90 TABLET | Refills: 3 | Status: SHIPPED | OUTPATIENT
Start: 2024-06-07 | End: 2025-06-07

## 2024-06-07 RX ORDER — METOPROLOL TARTRATE 25 MG/1
25 TABLET, FILM COATED ORAL 2 TIMES DAILY
Qty: 180 TABLET | Refills: 3 | Status: SHIPPED | OUTPATIENT
Start: 2024-06-07 | End: 2025-06-07

## 2024-06-07 NOTE — PROGRESS NOTES
Outreach call to patient to check in 30 days after hospital discharge to support smooth transition of care.  Will continue to monitor through transition period.

## 2024-06-10 ENCOUNTER — OFFICE VISIT (OUTPATIENT)
Dept: DERMATOLOGY | Facility: CLINIC | Age: 74
End: 2024-06-10
Payer: MEDICARE

## 2024-06-10 DIAGNOSIS — L57.8 DIFFUSE PHOTODAMAGE OF SKIN: ICD-10-CM

## 2024-06-10 DIAGNOSIS — L21.9 SEBORRHEIC DERMATITIS: ICD-10-CM

## 2024-06-10 DIAGNOSIS — Z87.2 HISTORY OF ACTINIC KERATOSES: ICD-10-CM

## 2024-06-10 DIAGNOSIS — D22.5 MELANOCYTIC NEVUS OF TRUNK: ICD-10-CM

## 2024-06-10 DIAGNOSIS — L57.0 ACTINIC KERATOSIS: ICD-10-CM

## 2024-06-10 DIAGNOSIS — L82.1 SEBORRHEIC KERATOSIS: ICD-10-CM

## 2024-06-10 DIAGNOSIS — L73.9 FOLLICULITIS: ICD-10-CM

## 2024-06-10 DIAGNOSIS — D48.5 NEOPLASM OF UNCERTAIN BEHAVIOR OF SKIN: Primary | ICD-10-CM

## 2024-06-10 DIAGNOSIS — L81.4 LENTIGO: ICD-10-CM

## 2024-06-10 PROCEDURE — 1159F MED LIST DOCD IN RCRD: CPT | Performed by: DERMATOLOGY

## 2024-06-10 PROCEDURE — 99214 OFFICE O/P EST MOD 30 MIN: CPT | Performed by: DERMATOLOGY

## 2024-06-10 PROCEDURE — 3048F LDL-C <100 MG/DL: CPT | Performed by: DERMATOLOGY

## 2024-06-10 PROCEDURE — 3044F HG A1C LEVEL LT 7.0%: CPT | Performed by: DERMATOLOGY

## 2024-06-10 PROCEDURE — 17004 DESTROY PREMAL LESIONS 15/>: CPT | Performed by: DERMATOLOGY

## 2024-06-10 PROCEDURE — 11311 SHAVE SKIN LESION 0.6-1.0 CM: CPT | Performed by: DERMATOLOGY

## 2024-06-10 ASSESSMENT — DERMATOLOGY PATIENT ASSESSMENT
WHERE ARE THESE NEW OR CHANGING LESIONS LOCATED: LEFT FOREARM
DO YOU HAVE ANY NEW OR CHANGING LESIONS: YES
DO YOU USE SUNSCREEN: OCCASIONALLY
DO YOU USE A TANNING BED: NO

## 2024-06-10 ASSESSMENT — DERMATOLOGY QUALITY OF LIFE (QOL) ASSESSMENT: ARE THERE EXCLUSIONS OR EXCEPTIONS FOR THE QUALITY OF LIFE ASSESSMENT: NO

## 2024-06-12 LAB
LABORATORY COMMENT REPORT: NORMAL
PATH REPORT.FINAL DX SPEC: NORMAL
PATH REPORT.GROSS SPEC: NORMAL
PATH REPORT.MICROSCOPIC SPEC OTHER STN: NORMAL
PATH REPORT.RELEVANT HX SPEC: NORMAL
PATH REPORT.TOTAL CANCER: NORMAL

## 2024-06-12 NOTE — PROGRESS NOTES
Subjective     Edward D Hume is a 74 y.o. male who presents for the following: Skin Exam.  He notes dry patches on his face, especially in his eyebrows and around his nose.  He denies any new, changing, or concerning skin lesions since his last visit; no bleeding, itching, or burning lesions.      Review of Systems:  No other skin or systemic complaints other than what is documented elsewhere in the note.    The following portions of the chart were reviewed this encounter and updated as appropriate:       Skin Cancer History  No skin cancer on file.    Specialty Problems          Dermatology Problems    Melanocytic nevi of scalp and neck    Postoperative scar       Past Dermatologic / Past Relevant Medical History:    - history of AKs   - no h/o atypical nevi or skin cancer    Family History:    No family history of melanoma or skin cancer    Social History:    The patient is retired from working as a  and designing CT scan machines; he enjoys grilling; he recent underwent minimally invasive CABG surgery    Allergies:  Amoxicillin, Metformin, and Iodinated contrast media    Current Medications / CAM's:    Current Outpatient Medications:     albuterol 90 mcg/actuation inhaler, Inhale 2 puffs every 6 hours if needed for shortness of breath., Disp: 18 g, Rfl: 2    allopurinol (Zyloprim) 300 mg tablet, Take 1 tablet (300 mg) by mouth once daily., Disp: 30 tablet, Rfl: 4    amLODIPine (Norvasc) 10 mg tablet, Take 1 tablet (10 mg) by mouth once daily., Disp: 30 tablet, Rfl: 4    aspirin 81 mg EC tablet, Take 1 tablet (81 mg) by mouth once daily., Disp: 30 tablet, Rfl: 4    atenolol (Tenormin) 50 mg tablet, Take 1 tablet (50 mg) by mouth once daily., Disp: 30 tablet, Rfl: 3    atorvastatin (Lipitor) 80 mg tablet, Take 1 tablet (80 mg) by mouth once daily at bedtime., Disp: 90 tablet, Rfl: 3    biotin 10 mg tablet, Take 1 tablet (10 mg) by mouth once daily., Disp: , Rfl:     blood sugar diagnostic  "(Accu-Chek Kamala Plus test strp) strip, 1 strip 2 times a day., Disp: 90 each, Rfl: 1    cranberry fruit extract (cranberry extract) 300 mg tablet, Take 650 mg by mouth once daily., Disp: , Rfl:     famotidine (Pepcid) 20 mg tablet, Take 1 tablet (20 mg) by mouth once daily at bedtime., Disp: 30 tablet, Rfl: 4    gemfibrozil (Lopid) 600 mg tablet, Take 1 tablet (600 mg) by mouth 2 times a day., Disp: 60 tablet, Rfl: 2    glipiZIDE XL (Glucotrol XL) 10 mg 24 hr tablet, Take 2 tablets (20 mg) by mouth once daily., Disp: 60 tablet, Rfl: 2    ibuprofen 200 mg tablet, Take 1 tablet (200 mg) by mouth every 6 hours if needed for mild pain (1 - 3)., Disp: 30 tablet, Rfl: 0    insulin glargine (Toujeo SoloStar U-300 Insulin) 300 unit/mL (1.5 mL) injection, Inject 36 Units under the skin once daily at bedtime. Take as directed per insulin instructions., Disp: 10.8 mL, Rfl: 1    Januvia 100 mg tablet, Take 1 tablet (100 mg) by mouth once daily., Disp: 30 tablet, Rfl: 3    lidocaine 4 % cream, Apply topically 4 times a day as needed for mild pain (1 - 3)., Disp: 30 g, Rfl: 1    metoprolol tartrate (Lopressor) 25 mg tablet, Take 1 tablet (25 mg) by mouth 2 times a day., Disp: 180 tablet, Rfl: 3    omeprazole (PriLOSEC) 40 mg DR capsule, Take 1 capsule (40 mg) by mouth once daily in the morning. Take before meals., Disp: 30 capsule, Rfl: 4    pen needle, diabetic (BD Ultra-Fine Short Pen Needle) 31 gauge x 5/16\" needle, USE AS DIRECTED, Disp: 100 each, Rfl: 3    tamsulosin (Flomax) 0.4 mg 24 hr capsule, Take 1 capsule (0.4 mg) by mouth 2 times a day., Disp: 180 capsule, Rfl: 0    valsartan-hydrochlorothiazide (Diovan-HCT) 160-12.5 mg tablet, Take 1 tablet by mouth once daily., Disp: 30 tablet, Rfl: 4     Objective   Well appearing patient in no apparent distress; mood and affect are within normal limits.    A waist-up examination was performed including scalp, face, eyes, ears, nose, lips, neck, chest, axillae, abdomen, back, and " bilateral upper extremities. All findings within normal limits unless otherwise noted below.        Assessment/Plan   1. Neoplasm of uncertain behavior of skin  Right Ear Superior Helix  6 mm erythematous, scaly papule           Shave removal    Lesion length (cm):  0.6  Margin per side (cm):  0  Lesion diameter (cm):  0.6  Informed consent: discussed and consent obtained    Timeout: patient name, date of birth, surgical site, and procedure verified    Procedure prep:  Patient was prepped and draped  Anesthesia: the lesion was anesthetized in a standard fashion    Anesthetic:  1% lidocaine w/ epinephrine 1-100,000 local infiltration  Instrument used: flexible razor blade    Hemostasis achieved with: aluminum chloride    Outcome: patient tolerated procedure well    Post-procedure details: sterile dressing applied and wound care instructions given    Dressing type: bandage and petrolatum      Staff Communication: Dermatology Local Anesthesia: 1 % Lidocaine / Epinephrine - Amount:0.5ml    Specimen 1 - Dermatopathology- DERM LAB  Differential Diagnosis: HAK v SCCIS  Check Margins Yes/No?:    Comments:    Dermpath Lab: Routine Histopathology (formalin-fixed tissue)    2. Actinic keratosis (19)  Scalp (19)  Scattered on the patient's scalp and face, there are multiple erythematous, gritty, scaly macules as well as a few slightly hyperkeratotic, thin papules    Actinic Keratoses - scattered on scalp and face.  The pre-cancerous nature of these lesions and treatment options were discussed with the patient today.  At this time, I recommend treatment with liquid nitrogen cryotherapy.  The patient expressed understanding, is in agreement with this plan, and wishes to proceed with cryotherapy today.    Destr of lesion - Scalp  Complexity: simple    Destruction method: cryotherapy    Informed consent: discussed and consent obtained    Lesion destroyed using liquid nitrogen: Yes    Cryotherapy cycles:  1  Outcome: patient  tolerated procedure well with no complications    Post-procedure details: wound care instructions given      3. Melanocytic nevus of trunk  Scattered on the patient's face, neck, trunk, and bilateral upper extremities, there are several small, round- to oval-shaped, brown-pigmented and pink-colored, symmetric, uniform-appearing macules and dome-shaped papules    Clinically benign- to slightly atypical-appearing nevi - the clinically benign- to slightly atypical-appearing nature of the patient's nevi was discussed with the patient today.  None of the patient's nevi meet threshold for biopsy today.  I emphasized the importance of performing monthly self-skin exams using the ABCDs of monitoring moles, which were reviewed with the patient today and an informational hand-out provided.  I also emphasized the importance of sun avoidance and sun protection with daily sunscreen use.  The patient expressed understanding and is in agreement with this plan.    4. Seborrheic keratosis  Scattered on the patient's face, neck, trunk, and bilateral upper extremities, there are multiple tan- to light brown-colored, hyperkeratotic, stuck-on appearing papules of varying size and shape    Seborrheic Keratoses - the benign nature of these lesions was discussed with the patient today and reassurance provided.  No treatment is medically indicated for these lesions at this time.    5. Lentigo  Photodistributed  Multiple tan- to light brown-colored, round- to oval-shaped, symmetric and uniform-appearing macules and small patches consistent with lentigines scattered in sun-exposed areas.    Solar Lentigines and photodamage.  The clinically benign-appearing nature of these lesions and their relation to chronic sun exposure were discussed with the patient today and reassurance provided.  None of these lesions meet threshold for biopsy today, and thus no treatment is medically indicated for these lesions at this time.  The signs and symptoms of  skin cancer were reviewed and the patient was advised to practice sun protection and sun avoidance, use daily sunscreen, and perform regular self skin exams.  The patient was instructed to monitor these lesions for any changes, such as in size, shape, or color, or associated symptoms and to call our office to schedule a return visit for re-evaluation if any such changes or symptoms are noticed in the future.  The patient expressed understanding and is in agreement with this plan.    6. Folliculitis  Left Breast  Scattered on the patient's chest and back, there are several follicular-based erythematous, inflammatory papules and pustules    Folliculitis - flare on chest and back.  The bacterial nature of this condition and treatment options were discussed with the patient today.  At this time, I recommend topical antibiotic therapy with Clindamycin 1% lotion, which the patient was instructed to apply twice daily to the affected areas or up to 3-4 times per day as needed for active lesions.  The risks, benefits, and side effects of this medication were discussed.  The patient expressed understanding and is in agreement with this plan.    7. Seborrheic dermatitis  Head - Anterior (Face)  On the patient's face, mainly the glabella and bilateral eyebrows and perinasal creases, there are pink, scaly patches with whitish-yellowish, greasy scale    Seborrheic Dermatitis - flare on face.  The potentially chronic and intermittently flaring nature of this condition and treatment options were discussed extensively with the patient today.  At this time, I recommend topical anti-fungal therapy with Ketoconazole 2% cream, which the patient was instructed to apply twice daily to the affected areas of the face.  The risks, benefits, and side effects of this medication were discussed.  The patient expressed understanding and is in agreement with this plan.    8. History of actinic keratoses  There is evidence of photodamage in  sun-exposed areas.    History of actinic keratoses and photodamage.  The signs and symptoms of skin cancer were reviewed and the patient was advised to practice sun protection and sun avoidance, use daily sunscreen, and perform regular self skin exams.  I will have the patient return to our office in 4-6 months, pending the above biopsy result, for routine follow-up and skin exam, and the patient was instructed to call our office should the patient notice any new, changing, symptomatic, or otherwise concerning skin lesions before then.  The patient expressed understanding and is in agreement with this plan.    9. Diffuse photodamage of skin  Photodistributed  Diffuse photodamage with actinic changes with telangiectasia and mottled pigmentation in sun-exposed areas.    Photodamage.  The signs and symptoms of skin cancer were reviewed and the patient was advised to practice sun protection and sun avoidance, use daily sunscreen, and perform regular self skin exams.  Sun protection was discussed, including avoiding the mid-day sun, wearing a sunscreen with SPF at least 50, and stressing the need for reapplication of sunscreen and applying more than they think they need.      Other Procedures Placed This Encounter  Staff Communication: Dermatology Local Anesthesia: 1 % Lidocaine / Epinephrine - Amount:0.5ml

## 2024-06-19 LAB
AORTIC VALVE MEAN GRADIENT: 5 MMHG
AORTIC VALVE PEAK VELOCITY: 1.64 M/S
AV PEAK GRADIENT: 10.8 MMHG
LEFT VENTRICLE INTERNAL DIMENSION DIASTOLE: 5.7 CM (ref 3.5–6)
TRICUSPID ANNULAR PLANE SYSTOLIC EXCURSION: 1.7 CM

## 2024-06-29 NOTE — PROGRESS NOTES
Subjective   Patient is a 74 y.o. male who presents with aortic valve disease secondary to { ETIOLOGY:73977}. Current symptoms that the patient reports are {SYMPTOMS:14095}. Echocardiography reveals {ECHO FINDINGS:10518}. Other cardiac history includes {diagnoses; cardiac:11593}. Additional pertinant findings include { FINDINGS:82655}. Cardiac risk factors include {findings; risks cardiac:61304}.    Patient Active Problem List    Diagnosis Date Noted    Postoperative scar 06/03/2024    Hypocalcemia 06/03/2024    S/P CABG x 1 05/28/2024    Encounter for osteoporosis screening in asymptomatic postmenopausal patient 06/20/2023    Benign prostatic hyperplasia without lower urinary tract symptoms 06/15/2023    Cataract, nuclear sclerotic, both eyes 06/15/2023    Stage 3a chronic kidney disease (Multi) 06/15/2023    Musculoskeletal fibromatosis 06/15/2023    ED (erectile dysfunction) 06/15/2023    Elevated coronary artery calcium score 06/15/2023    Gastroesophageal reflux disease without esophagitis 06/15/2023    Gout 06/15/2023    Mixed hyperlipidemia 06/15/2023    Primary hypertension 06/15/2023    Type 2 diabetes mellitus with stage 3a chronic kidney disease, with long-term current use of insulin (Multi) 06/15/2023    Benign paroxysmal positional vertigo of right ear 06/15/2023    Melanocytic nevi of scalp and neck 12/13/2021    Posterior vitreous detachment of left eye 02/19/2019     Past Medical History:   Diagnosis Date    Acute nonparalytic poliomyelitis ([facility])     Acute nonparalytic polio    Awareness under anesthesia     Body mass index (BMI) 25.0-25.9, adult 10/26/2019    Body mass index (BMI) of 25.0 to 25.9 in adult    Body mass index (BMI) 27.0-27.9, adult 09/22/2021    Body mass index (BMI) of 27.0 to 27.9 in adult    Body mass index (BMI) 29.0-29.9, adult 08/31/2019    Body mass index (BMI) of 29.0 to 29.9 in adult    CKD (chronic kidney disease)     Crushing injury of unspecified finger(s), initial  encounter 06/12/2014    Injury, crush, finger    Diabetes mellitus (Multi)     Disorder of the skin and subcutaneous tissue, unspecified 09/04/2019    Skin lesion    Disorder of the skin and subcutaneous tissue, unspecified 03/30/2016    Skin lesion    Elevated prostate specific antigen (PSA)     Elevated prostate specific antigen (PSA)    GERD (gastroesophageal reflux disease)     Gout     History of falling 06/27/2016    History of fall    HLD (hyperlipidemia)     HTN (hypertension)     Other chest pain 03/07/2018    Chest pressure    Other conditions influencing health status 02/22/2017    Type 2 diabetes mellitus, uncontrolled    Other conditions influencing health status 03/04/2016    Epicondylitis    Other conditions influencing health status     Nephrolithiasis    Personal history of other mental and behavioral disorders 11/06/2018    History of depression    Personal history of other specified conditions 04/16/2019    History of dizziness    Personal history of other specified conditions 04/16/2019    History of weight loss    Personal history of other specified conditions 06/29/2016    History of abdominal pain    Puncture wound without foreign body of left hand, initial encounter 03/05/2019    Puncture wound of hand, left    Puncture wound without foreign body of unspecified hand, initial encounter 03/05/2019    Puncture wound, hand    Superficial mycosis, unspecified 12/01/2014    Fungal otitis externa    Trigger finger, left middle finger 07/24/2017    Trigger middle finger of left hand    Trigger finger, left middle finger 07/24/2017    Trigger middle finger of left hand    Trigger finger, left ring finger 07/24/2017    Trigger ring finger of left hand    Trigger finger, right index finger 07/24/2017    Trigger index finger of right hand    Trigger finger, right middle finger 11/10/2017    Trigger middle finger of right hand      Past Surgical History:   Procedure Laterality Date    CARDIAC  CATHETERIZATION      CARDIAC CATHETERIZATION N/A 2024    Procedure: Left Heart Cath with Coronary Angiography and LV;  Surgeon: Jerrod Franco MD;  Location: [facility] Cardiac Cath Lab;  Service: Cardiovascular;  Laterality: N/A;  LEFT HEART CATH WITH POSSIBLE PCI WED MAY 1, 2024 AT 11:00 AM PT WILL ARRIVE AT 9:30 AM @ [facility] DR. FRANCO    CORONARY ARTERY BYPASS GRAFT      HAND TENDON SURGERY  02/15/2013    Hand Incision Tendon Sheath Of A Finger    KNEE ARTHROSCOPY W/ DEBRIDEMENT      OTHER SURGICAL HISTORY  2013    Needle Biopsy Of Prostate    OTHER SURGICAL HISTORY  2020    Retinal detachment repair    OTHER SURGICAL HISTORY  2017    Upper Gastrointestinal Endoscopy, Simple Primary Exam    OTHER SURGICAL HISTORY  2013    Repair Of Retinal Detachment Procedure Detail    OTHER SURGICAL HISTORY  2013    Surg Rad Resect Tonsil/Tonsil Pillars/Retromol Tri W/O Closure    SHOULDER SURGERY  2013    Shoulder Surgery      (Not in a hospital admission)      Review of systems negative except for ***.      Data Review: {icu labs:24188}    ECG: {EC}

## 2024-07-11 ENCOUNTER — APPOINTMENT (OUTPATIENT)
Dept: DERMATOLOGY | Facility: CLINIC | Age: 74
End: 2024-07-11
Payer: MEDICARE

## 2024-07-11 DIAGNOSIS — L82.0 INFLAMED SEBORRHEIC KERATOSIS: ICD-10-CM

## 2024-07-11 DIAGNOSIS — L57.8 DIFFUSE PHOTODAMAGE OF SKIN: ICD-10-CM

## 2024-07-11 DIAGNOSIS — L81.4 LENTIGO: ICD-10-CM

## 2024-07-11 DIAGNOSIS — L57.0 ACTINIC KERATOSIS: Primary | ICD-10-CM

## 2024-07-11 DIAGNOSIS — L82.1 SEBORRHEIC KERATOSIS: ICD-10-CM

## 2024-07-11 PROCEDURE — 99213 OFFICE O/P EST LOW 20 MIN: CPT | Performed by: DERMATOLOGY

## 2024-07-11 PROCEDURE — 3044F HG A1C LEVEL LT 7.0%: CPT | Performed by: DERMATOLOGY

## 2024-07-11 PROCEDURE — 1159F MED LIST DOCD IN RCRD: CPT | Performed by: DERMATOLOGY

## 2024-07-11 PROCEDURE — 17110 DESTRUCTION B9 LES UP TO 14: CPT | Performed by: DERMATOLOGY

## 2024-07-11 PROCEDURE — 3048F LDL-C <100 MG/DL: CPT | Performed by: DERMATOLOGY

## 2024-07-11 PROCEDURE — 17004 DESTROY PREMAL LESIONS 15/>: CPT | Performed by: DERMATOLOGY

## 2024-07-11 NOTE — PROGRESS NOTES
Subjective     Edward D Hume is a 74 y.o. male who presents for the following: Discuss recent biopsy result and treatment options.  Biopsy of a suspicious lesion on his right ear superior helix performed at his last visit in our office on 6/10/24 revealed an actinic keratosis.    Today, the patient states the biopsy site healed well.  Since his last visit, he reports he has noticed a brown, raised, scaly bump on his left upper chest, which has been itching.  It has not changed in any other way, including in size, shape, or color, and it does not hurt or bleed.  He denies any other new, changing, or concerning skin lesions since his last visit; no bleeding, itching, or burning lesions.      Review of Systems:  No other skin or systemic complaints other than what is documented elsewhere in the note.    The following portions of the chart were reviewed this encounter and updated as appropriate:       Skin Cancer History  No skin cancer on file.    Specialty Problems          Dermatology Problems    Melanocytic nevi of scalp and neck    Postoperative scar       Past Dermatologic / Past Relevant Medical History:    - history of AKs   - no h/o atypical nevi or skin cancer    Family History:    No family history of melanoma or skin cancer    Social History:    The patient is retired from working as a  and designing CT scan machines; he enjoys grilling; he recent underwent minimally invasive CABG surgery; he states he will be traveling to [place], [place], this summer.    Allergies:  Amoxicillin, Metformin, and Iodinated contrast media    Current Medications / CAM's:    Current Outpatient Medications:     albuterol 90 mcg/actuation inhaler, Inhale 2 puffs every 6 hours if needed for shortness of breath., Disp: 18 g, Rfl: 2    allopurinol (Zyloprim) 300 mg tablet, Take 1 tablet (300 mg) by mouth once daily., Disp: 30 tablet, Rfl: 4    amLODIPine (Norvasc) 10 mg tablet, Take 1 tablet (10 mg) by mouth  "once daily., Disp: 30 tablet, Rfl: 4    aspirin 81 mg EC tablet, Take 1 tablet (81 mg) by mouth once daily., Disp: 30 tablet, Rfl: 4    atenolol (Tenormin) 50 mg tablet, Take 1 tablet (50 mg) by mouth once daily., Disp: 30 tablet, Rfl: 3    atorvastatin (Lipitor) 80 mg tablet, Take 1 tablet (80 mg) by mouth once daily at bedtime., Disp: 90 tablet, Rfl: 3    biotin 10 mg tablet, Take 1 tablet (10 mg) by mouth once daily., Disp: , Rfl:     cranberry fruit extract (cranberry extract) 300 mg tablet, Take 650 mg by mouth once daily., Disp: , Rfl:     famotidine (Pepcid) 20 mg tablet, Take 1 tablet (20 mg) by mouth once daily at bedtime., Disp: 30 tablet, Rfl: 4    gemfibrozil (Lopid) 600 mg tablet, Take 1 tablet (600 mg) by mouth 2 times a day., Disp: 60 tablet, Rfl: 2    glipiZIDE XL (Glucotrol XL) 10 mg 24 hr tablet, Take 2 tablets (20 mg) by mouth once daily., Disp: 60 tablet, Rfl: 2    insulin glargine (Toujeo SoloStar U-300 Insulin) 300 unit/mL (1.5 mL) injection, Inject 36 Units under the skin once daily at bedtime. Take as directed per insulin instructions., Disp: 10.8 mL, Rfl: 1    Januvia 100 mg tablet, Take 1 tablet (100 mg) by mouth once daily., Disp: 30 tablet, Rfl: 3    lidocaine 4 % cream, Apply topically 4 times a day as needed for mild pain (1 - 3)., Disp: 30 g, Rfl: 1    metoprolol tartrate (Lopressor) 25 mg tablet, Take 1 tablet (25 mg) by mouth 2 times a day., Disp: 180 tablet, Rfl: 3    omeprazole (PriLOSEC) 40 mg DR capsule, Take 1 capsule (40 mg) by mouth once daily in the morning. Take before meals., Disp: 30 capsule, Rfl: 4    pen needle, diabetic (BD Ultra-Fine Short Pen Needle) 31 gauge x 5/16\" needle, USE AS DIRECTED, Disp: 100 each, Rfl: 3    tamsulosin (Flomax) 0.4 mg 24 hr capsule, Take 1 capsule (0.4 mg) by mouth 2 times a day., Disp: 180 capsule, Rfl: 0    valsartan-hydrochlorothiazide (Diovan-HCT) 160-12.5 mg tablet, Take 1 tablet by mouth once daily., Disp: 30 tablet, Rfl: 4   "   Objective   Well appearing patient in no apparent distress; mood and affect are within normal limits.    A waist-up examination was performed including scalp, face, eyes, ears, nose, lips, neck, chest, axillae, abdomen, back, and bilateral upper extremities. All findings within normal limits unless otherwise noted below.        Assessment/Plan   1. Actinic keratosis (17)  Right Ear (17)  On the patient's right ear superior helix, there is a pink, well-healed scar at the recent biopsy site with a surrounding rim of erythema, grittiness, and scale; scattered on the patient's scalp, there are multiple erythematous, gritty, scaly macules    Biopsy-proven Actinic Keratosis and additional AKs -right ear superior helix, present on the deep and peripheral margins, and scattered on scalp, respectively.  The pre-cancerous nature of these lesions and treatment options were discussed with the patient today.  At this time, I recommend treatment with liquid nitrogen cryotherapy.  The patient expressed understanding, is in agreement with this plan, and wishes to proceed with cryotherapy today.    Destr of lesion - Right Ear (17)  Complexity: simple    Destruction method: cryotherapy    Informed consent: discussed and consent obtained    Lesion destroyed using liquid nitrogen: Yes    Cryotherapy cycles:  1  Outcome: patient tolerated procedure well with no complications    Post-procedure details: wound care instructions given      2. Inflamed seborrheic keratosis  Left Breast  On the patient's left clavicular chest, there is a 8 mm erythematous and light brown-colored, hyperkeratotic, stuck-on appearing papule with a surrounding rim of erythema    Inflamed Seborrheic Keratosis -left clavicular chest.  The benign nature of this lesion was discussed with the patient today and reassurance provided.  Given the history the patient provides of frequent irritation and associated symptoms as well as its inflamed appearance on exam today,  I offered to treat this lesion with liquid nitrogen cryotherapy.  The patient expressed understanding, is in agreement with this plan, and wishes to proceed with cryotherapy today.    Destr of lesion - Left Breast  Complexity: simple    Destruction method: cryotherapy    Informed consent: discussed and consent obtained    Lesion destroyed using liquid nitrogen: Yes    Cryotherapy cycles:  2  Outcome: patient tolerated procedure well with no complications    Post-procedure details: wound care instructions given      3. Seborrheic keratosis  Scattered on the patient's face, neck, trunk, and bilateral upper extremities, there are multiple tan- to light brown-colored, hyperkeratotic, stuck-on appearing papules of varying size and shape    Seborrheic Keratoses - the benign nature of these lesions was discussed with the patient today and reassurance provided.  No treatment is medically indicated for the noninflamed SKs at this time.    4. Lentigo  Photodistributed  Multiple tan- to light brown-colored, round- to oval-shaped, symmetric and uniform-appearing macules and small patches consistent with lentigines scattered in sun-exposed areas.    Solar Lentigines and photodamage.  The clinically benign-appearing nature of these lesions and their relation to chronic sun exposure were discussed with the patient today and reassurance provided.  None of these lesions meet threshold for biopsy today, and thus no treatment is medically indicated for these lesions at this time.  The signs and symptoms of skin cancer were reviewed and the patient was advised to practice sun protection and sun avoidance, use daily sunscreen, and perform regular self skin exams.  The patient was instructed to monitor these lesions for any changes, such as in size, shape, or color, or associated symptoms and to call our office to schedule a return visit for re-evaluation if any such changes or symptoms are noticed in the future.  The patient expressed  understanding and is in agreement with this plan.    5. Diffuse photodamage of skin  Photodistributed  Diffuse photodamage with actinic changes with telangiectasia and mottled pigmentation in sun-exposed areas.    History of actinic keratoses and photodamage.  The signs and symptoms of skin cancer were reviewed and the patient was advised to practice sun protection and sun avoidance, use daily sunscreen, and perform regular self skin exams.  I will have the patient return to my office in 4 to 6 months from the date of his last visit for routine follow-up and skin exam, and the patient was instructed to call our office should the patient notice any new, changing, symptomatic, or otherwise concerning skin lesions before then.  The patient expressed understanding and is in agreement with this plan.

## 2024-08-02 ENCOUNTER — OFFICE VISIT (OUTPATIENT)
Dept: CARDIOLOGY | Facility: HOSPITAL | Age: 74
End: 2024-08-02
Payer: MEDICARE

## 2024-08-02 VITALS
HEIGHT: 71 IN | OXYGEN SATURATION: 97 % | DIASTOLIC BLOOD PRESSURE: 80 MMHG | SYSTOLIC BLOOD PRESSURE: 138 MMHG | HEART RATE: 67 BPM | WEIGHT: 207 LBS | BODY MASS INDEX: 28.98 KG/M2

## 2024-08-02 DIAGNOSIS — I10 PRIMARY HYPERTENSION: Chronic | ICD-10-CM

## 2024-08-02 DIAGNOSIS — Z95.1 S/P CABG X 1: ICD-10-CM

## 2024-08-02 DIAGNOSIS — E78.2 MIXED HYPERLIPIDEMIA: Primary | Chronic | ICD-10-CM

## 2024-08-02 PROCEDURE — 3044F HG A1C LEVEL LT 7.0%: CPT | Performed by: NURSE PRACTITIONER

## 2024-08-02 PROCEDURE — 99215 OFFICE O/P EST HI 40 MIN: CPT | Performed by: NURSE PRACTITIONER

## 2024-08-02 PROCEDURE — 1036F TOBACCO NON-USER: CPT | Performed by: NURSE PRACTITIONER

## 2024-08-02 PROCEDURE — 1159F MED LIST DOCD IN RCRD: CPT | Performed by: NURSE PRACTITIONER

## 2024-08-02 PROCEDURE — 3048F LDL-C <100 MG/DL: CPT | Performed by: NURSE PRACTITIONER

## 2024-08-02 PROCEDURE — 3008F BODY MASS INDEX DOCD: CPT | Performed by: NURSE PRACTITIONER

## 2024-08-02 PROCEDURE — 1160F RVW MEDS BY RX/DR IN RCRD: CPT | Performed by: NURSE PRACTITIONER

## 2024-08-02 PROCEDURE — 3078F DIAST BP <80 MM HG: CPT | Performed by: NURSE PRACTITIONER

## 2024-08-02 PROCEDURE — 3075F SYST BP GE 130 - 139MM HG: CPT | Performed by: NURSE PRACTITIONER

## 2024-08-02 ASSESSMENT — ENCOUNTER SYMPTOMS: HYPERTENSION: 1

## 2024-08-02 NOTE — PROGRESS NOTES
"Subjective   Edward D Hume is a 74 y.o. male.    Chief Complaint:  Hyperlipidemia and Hypertension    Mr. Hume returns for a routine 3 month follow up. He unfortunately has not been feeling well. He is complaining of fatigue and chest soreness. He also describes an intermittent sharp shooting pain, though this is fairly brief. He has remained compliant with is medications, denying any intolerances. He remains active with house work and walking, though complains of \"not wanting to do the work\". He was previously not interested in cardiac rehab. His main symptom prior to bypass surgery was refulx, which is better. He offers no other cardiovascular complaints or concerns today. He denies any complaints of  lightheadedness, dizziness, palpitations, syncope, orthopnea, paroxysmal nocturnal dyspnea, lower extremity swelling or bleeding concerns.      Hyperlipidemia    Hypertension        Review of Systems   All other systems reviewed and are negative.      Objective   Physical Exam  Constitutional:       Appearance: Healthy appearance. In no distress  Pulmonary:      Effort: Pulmonary effort is normal.      Breath sounds: Normal breath sounds.   Cardiovascular:      Normal rate. Regular rhythm. Normal S1. Normal S2.       Murmurs: There is no murmur.      Carotids: right carotid pulse +2, no bruit heard over the right carotid. left carotid pulse +2, no bruit heard over the left carotid.  Edema:     Peripheral edema absent.   Abdominal:      Palpations: Abdomen is soft.   Musculoskeletal:       Cervical back: Normal range of motion.   Skin:     General: Skin is warm and dry. Normal color and pigmentation   Neurological:      Mental Status: Alert and oriented to person, place and time.   Psychiatric:     Mood and Affect: appropriate mood and appropriate affect.       Lab Review:   Lab Results   Component Value Date     05/06/2024    K 3.8 05/06/2024     05/06/2024    CO2 21 05/06/2024    BUN 31 (H) 05/06/2024    " CREATININE 1.21 05/06/2024    GLUCOSE 105 (H) 05/06/2024    CALCIUM 8.0 (L) 05/06/2024     Lab Results   Component Value Date    WBC 8.3 05/06/2024    HGB 11.1 (L) 05/06/2024    HCT 32.1 (L) 05/06/2024    MCV 92 05/06/2024     05/06/2024     Lab Results   Component Value Date    CHOL 140 02/23/2024    TRIG 258 (H) 02/23/2024    HDL 22.7 02/23/2024       Assessment/Plan   Mr. Hume is a 74 year old  male with a past medical history significant for hypertension, hyperlipidemia, CKD, T2DM, GERD and elevated CACS of 1736.09 (6/2022). Heart catheterization 5/2024 showed severe 2-vessel disease with normal LV systolic function, now s/p CABG (MIDCAB) with Dr. Bhumi Price with [facility] 5/2/24. He presents today for routine follow up and unfortunately is complaining of some fatigue, chest soreness and intermittent sharp pains. He denies any clear angina. His VS remain stable today. He was previously not interested in cardiac rehab, though I will put in an order for him to get enrolled. I will also review with Dr. Anthony the need for any additional cardiovascular testing such as a stress test. I will have him obtain a FLP with AST and ALT. I will call him with the plan for follow up. He knows to call for any concerns.

## 2024-08-02 NOTE — PATIENT INSTRUCTIONS
MIDCAB bypass surgery    Start cardiac rehab     Stop atenolol     Obtain fasting labs     I will call you with the plan

## 2024-08-12 ENCOUNTER — DOCUMENTATION (OUTPATIENT)
Dept: CARDIOLOGY | Facility: CLINIC | Age: 74
End: 2024-08-12
Payer: MEDICARE

## 2024-08-12 NOTE — PROGRESS NOTES
I reviewed patients complaints of fatigue and intermittent sharp chest pains with Dr. Anthony. He reviewed his angiogram from 5/1/24. We will not embark on any additional cardiovascular testing at this time. I gave him the phone number for cardiac rehab to get enrolled. He will follow up with us in clinic in 2 months. He knows to call for any concerns.

## 2024-08-27 ENCOUNTER — CLINICAL SUPPORT (OUTPATIENT)
Dept: CARDIAC REHAB | Facility: CLINIC | Age: 74
End: 2024-08-27
Payer: MEDICARE

## 2024-08-27 VITALS
RESPIRATION RATE: 12 BRPM | BODY MASS INDEX: 27.86 KG/M2 | OXYGEN SATURATION: 97 % | DIASTOLIC BLOOD PRESSURE: 64 MMHG | WEIGHT: 199 LBS | HEIGHT: 71 IN | SYSTOLIC BLOOD PRESSURE: 140 MMHG | HEART RATE: 70 BPM

## 2024-08-27 DIAGNOSIS — Z95.1 S/P CABG X 1: Primary | ICD-10-CM

## 2024-08-27 ASSESSMENT — DUKE ACTIVITY SCORE INDEX (DASI)
DASI METS SCORE: 6.6
CAN YOU PARTICIPATE IN MODERATE RECREATIONAL ACTIVITIES LIKE GOLF, BOWLING, DANCING, DOUBLES TENNIS OR THROWING A BASEBALL OR FOOTBALL: NO
CAN YOU DO MODERATE WORK AROUND THE HOUSE LIKE VACUUMING, SWEEPING FLOORS OR CARRYING GROCERIES: YES
CAN YOU RUN A SHORT DISTANCE: NO
CAN YOU HAVE SEXUAL RELATIONS: NO
CAN YOU DO HEAVY WORK AROUND THE HOUSE LIKE SCRUBBING FLOORS OR LIFTING AND MOVING HEAVY FURNITURE: YES
CAN YOU DO YARD WORK LIKE RAKING LEAVES, WEEDING OR PUSHING A MOWER: YES
CAN YOU WALK INDOORS, SUCH AS AROUND YOUR HOUSE: YES
CAN YOU PARTICIPATE IN STRENOUS SPORTS LIKE SWIMMING, SINGLES TENNIS, FOOTBALL, BASKETBALL, OR SKIING: NO
TOTAL_SCORE: 31.45
CAN YOU TAKE CARE OF YOURSELF (EAT, DRESS, BATHE, OR USE TOILET): YES
CAN YOU CLIMB A FLIGHT OF STAIRS OR WALK UP A HILL: YES
CAN YOU WALK A BLOCK OR TWO ON LEVEL GROUND: YES
CAN YOU DO LIGHT WORK AROUND THE HOUSE LIKE DUSTING OR WASHING DISHES: YES

## 2024-08-27 ASSESSMENT — PATIENT HEALTH QUESTIONNAIRE - PHQ9
SUM OF ALL RESPONSES TO PHQ QUESTIONS 1-9: 4
5. POOR APPETITE OR OVEREATING: NOT AT ALL
3. TROUBLE FALLING OR STAYING ASLEEP OR SLEEPING TOO MUCH: NOT AT ALL
4. FEELING TIRED OR HAVING LITTLE ENERGY: SEVERAL DAYS
1. LITTLE INTEREST OR PLEASURE IN DOING THINGS: MORE THAN HALF THE DAYS
SUM OF ALL RESPONSES TO PHQ9 QUESTIONS 1 & 2: 2
7. TROUBLE CONCENTRATING ON THINGS, SUCH AS READING THE NEWSPAPER OR WATCHING TELEVISION: NOT AT ALL
9. THOUGHTS THAT YOU WOULD BE BETTER OFF DEAD, OR OF HURTING YOURSELF: NOT AT ALL
6. FEELING BAD ABOUT YOURSELF - OR THAT YOU ARE A FAILURE OR HAVE LET YOURSELF OR YOUR FAMILY DOWN: NOT AT ALL
8. MOVING OR SPEAKING SO SLOWLY THAT OTHER PEOPLE COULD HAVE NOTICED. OR THE OPPOSITE, BEING SO FIGETY OR RESTLESS THAT YOU HAVE BEEN MOVING AROUND A LOT MORE THAN USUAL: SEVERAL DAYS
SUM OF ALL RESPONSES TO PHQ QUESTIONS 1-9: 4
2. FEELING DOWN, DEPRESSED OR HOPELESS: NOT AT ALL

## 2024-08-27 NOTE — PROGRESS NOTES
Cardiac Rehabilitation Initial Treatment Plan    Name: Edward D Hume  Medical Record Number: [id number]  YOB: 1950  Age: 74 y.o.    Today’s Date: 8/27/2024  Primary Care Physician: Roselia Ibrahim MD  Referring Physician: Theresa Carpenter, APRN-CNP Jerrod Anthony MD  Program Location: [address]     General  Primary Diagnosis:   1. S/P CABG x 1  Referral to Cardiac Rehab         Onset/Date of Diagnosis: 5/2/2024    Initial Assessment, not yet started program.    AACVPR Risk Stratification: Moderate     Falls Risk: Medium  Psychosocial Assessment     Pre PHQ-9: 4      Sent PH-Q 9 to MD if score > 20: No; score < 20    Pt reported/currently experiencing stress: No  Patient uses stress management skills: No   History of: no history of anxiety or depression  Currently seeing a mental health provider: Yes, Then provider name: in progress  Social Support: Yes, Whom:in progress  Quality of Life Survey: SF-36   SF-36 Pre Post   Physical Component Score TBD TBD   Mental Component Score TBD TBD     Learning Assessment:  Learning assessment/barriers: None  Preferred learning method: Auditory and Visual  Barriers: None  Comments:    Stages of Change:Contemplation    Psychosocial Plan    Goal Status: Initial Assessment; goals not yet started    Psychosocial Goals: Demonstrating proper techniques for stress management and Maintain or lower PH-Q 9 score by discharge    Psychosocial Interventions/Education: To be done in Cardiac Rehab.    Initial Assessment: in progress    Nutrition Assessment:    Hyperlipidemia: Yes     Lipids:   Lab Results   Component Value Date    CHOL 140 02/23/2024    HDL 22.7 02/23/2024    LDLF 61 09/25/2023    TRIG 258 (H) 02/23/2024       Current Dietary Guidelines: Low fat, Low sodium  Barriers to dietary change: no    Diet Habit Survey: Picture Your Plate  Pre:    Post: To be done at discharge.    Diabetes Assessment    Lab Results   Component Value Date    HGBA1C 6.1  "06/03/2024       History of Diabetes: Yes Pt monitors BS at home: Yes   Frequency: 0 /day  FBS range: unknown -   Hypoglycemic Episodes:  unknown    Weight Management    Height: 180.3 cm (5' 10.98\")  Weight: 90.3 kg (199 lb)  BMI (Calculated): 27.77  No data recorded    Nutrition Plan    Goal Status: Initial Assessment; goals not yet started    Nutrition Goals: Lipid Goal: HDL>45, LDL <70, Total <180, Trigs <150, Improve Diet Habit Survey score by 5-10 points by discharge, Adapt a low-sodium, DASH diet prior to discharge, and Adapt a Mediterranean focused diet prior to discharge    Nutrition Interventions/Education:   To be done in Cardiac Rehab.    Initial Assessment: in progress    Exercise Assessment    No  Mode: NA  Frequency: NA  Duration: NA    Exercise Prescription     Exercise Prescription based on: Duke Activity Status Index (DASI)    DASI Score: 31.45   MET Score: 6.6   Frequency:  3 days/week   Mode: Treadmill, NuStep, and Recumbent Cycle   Duration: 27 total aerobic minutes   Intensity: RPE 12-16  Target HR:  To be calculated after 6 attended sessions.  MET Level: 2.9  Patient wears supplemental O2: No     Modality Workload METs Duration (minutes)   1 Pre-Exercise   2:00   2 Treadmill 2.5 mph  2.9 9:00   3 NuStep 4@56  2.7 9:00   4 Recumbent Bike 4@55  2.9 9:00   5 Cooldown    5 :00   6 Post-Exercise   2:00     Resistance Training: No   Home Exercise Prescription given: To be given at session # 6    Exercise Plan    Goal Status: Initial Assessment; goals not yet started    Exercise Goals: Increase exercise MET level by 5-10% each week, Increase total exercise duration to 30-45 minutes, and Obtain 150 minutes/week of moderate intensity aerobic exercise    Exercise Interventions/Education:   To be done in Cardiac Rehab.    Initial Assessment: in progress    Other Core Components/Risk Factor Assessment:    Medication adherence  Current Medications:   Medication Documentation Review Audit       Reviewed by " She Stout RN (Registered Nurse) on 08/27/24 at 0902      Medication Order Taking? Sig Documenting Provider Last Dose Status   albuterol 90 mcg/actuation inhaler [id number] Yes Inhale 2 puffs every 6 hours if needed for shortness of breath. Pj Ivy DO Taking Active   allopurinol (Zyloprim) 300 mg tablet [id number] Yes Take 1 tablet (300 mg) by mouth once daily. Pj Ivy DO Taking Active   amLODIPine (Norvasc) 10 mg tablet [id number] Yes Take 1 tablet (10 mg) by mouth once daily. Pj Ivy DO Taking Active   aspirin 81 mg EC tablet [id number] Yes Take 1 tablet (81 mg) by mouth once daily. Pj Ivy DO Taking Active   atorvastatin (Lipitor) 80 mg tablet [id number] Yes Take 1 tablet (80 mg) by mouth once daily at bedtime. Theresa Carpenter APRN-CNP Taking Active   biotin 10 mg tablet [id number] Yes Take 1 tablet (10 mg) by mouth once daily. Historical Provider, MD Taking Active   cranberry fruit extract (cranberry extract) 300 mg tablet [id number] Yes Take 650 mg by mouth once daily. Historical Provider, MD Taking Active   famotidine (Pepcid) 20 mg tablet [id number] Yes Take 1 tablet (20 mg) by mouth once daily at bedtime. Pj Ivy DO Taking Active   gemfibrozil (Lopid) 600 mg tablet [id number] Yes Take 1 tablet (600 mg) by mouth 2 times a day. Pj Ivy DO Taking Active   glipiZIDE XL (Glucotrol XL) 10 mg 24 hr tablet [id number]  Take 2 tablets (20 mg) by mouth once daily. Pj Ivy DO  Active   insulin glargine (Toujeo SoloStar U-300 Insulin) 300 unit/mL (1.5 mL) injection [id number] Yes Inject 36 Units under the skin once daily at bedtime. Take as directed per insulin instructions. Pj Ivy DO Taking Active   Januvia 100 mg tablet [id number] Yes Take 1 tablet (100 mg) by mouth once daily. Pj Ivy DO Taking Active   lidocaine 4 % cream [id number] Yes Apply topically 4 times a day as needed for mild pain (1 - 3). Pj Ivy DO Taking Active   metoprolol  "tartrate (Lopressor) 25 mg tablet [id number] Yes Take 1 tablet (25 mg) by mouth 2 times a day. Theresa Carpenter, APRN-CNP Taking Active   omeprazole (PriLOSEC) 40 mg DR capsule [id number] Yes Take 1 capsule (40 mg) by mouth once daily in the morning. Take before meals. Pj Ivy DO Taking Active   pen needle, diabetic (BD Ultra-Fine Short Pen Needle) 31 gauge x 5/16\" needle [id number] Yes USE AS DIRECTED Pj Ivy DO Taking Active   tamsulosin (Flomax) 0.4 mg 24 hr capsule [id number] Yes Take 1 capsule (0.4 mg) by mouth 2 times a day. Pj Ivy DO Taking Active   valsartan-hydrochlorothiazide (Diovan-HCT) 160-12.5 mg tablet [id number] Yes Take 1 tablet by mouth once daily. Pj Ivy DO Taking Active                                 Medication compliance: Yes   Uses pill box/organizer: No    Carries medication list: Yes     Blood Pressure Management  History of Hypertension: Yes   Medication Changes: No   Resting BP:  Visit Vitals  /64   Pulse 70   Resp 12        Heart Failure Management  Hx of Heart Failure: No    Smoking/Tobacco Assessment  Social History     Tobacco Use   Smoking Status Former    Types: Cigarettes   Smokeless Tobacco Never       Other Core Component Plan    Goal Status: Initial Assessment; goals not yet started    Other Core Component Goals: Medication compliance, Verbalize medication usage and drug actions by discharge, and Achieve resting BP of < 130/80 by discharge    Other Core Component Interventions/Education:   Medication compliance    Initial Assessment: in progress    Individual Patient Goals:    Increase strength and endurance by discharge  Establish a regular exercise routine 3-5 days a week by discharge     Goal Status: Initial Assessment; goals not yet started    Staff Comments:  Attend 3 days a week    Rehab Staff Signature: She Stout RN        "

## 2024-08-28 ENCOUNTER — CLINICAL SUPPORT (OUTPATIENT)
Dept: CARDIAC REHAB | Facility: CLINIC | Age: 74
End: 2024-08-28
Payer: MEDICARE

## 2024-08-28 DIAGNOSIS — Z95.1 S/P CABG X 1: ICD-10-CM

## 2024-08-28 PROCEDURE — 93798 PHYS/QHP OP CAR RHAB W/ECG: CPT | Performed by: INTERNAL MEDICINE

## 2024-08-30 ENCOUNTER — CLINICAL SUPPORT (OUTPATIENT)
Dept: CARDIAC REHAB | Facility: CLINIC | Age: 74
End: 2024-08-30
Payer: MEDICARE

## 2024-08-30 DIAGNOSIS — Z95.1 S/P CABG X 1: ICD-10-CM

## 2024-08-30 PROCEDURE — 93798 PHYS/QHP OP CAR RHAB W/ECG: CPT | Performed by: INTERNAL MEDICINE

## 2024-09-04 ENCOUNTER — CLINICAL SUPPORT (OUTPATIENT)
Dept: CARDIAC REHAB | Facility: CLINIC | Age: 74
End: 2024-09-04
Payer: MEDICARE

## 2024-09-04 DIAGNOSIS — Z95.1 S/P CABG X 1: ICD-10-CM

## 2024-09-04 PROCEDURE — 93798 PHYS/QHP OP CAR RHAB W/ECG: CPT | Performed by: INTERNAL MEDICINE

## 2024-09-04 NOTE — PROGRESS NOTES
Edward D Hume  1950  [id number]  74 y[place]o.    [facility] [id number]      Cardiac/Pulmonary Rehab Nutrition Education    9/4/2024  Gave and reviewed PYP with patient. PYP score 61. Pt has made changes to his diet since completing the PYP. He has decreased his intake of processed meats in the morning and now eats oatmeal with banana. Reviewed with him adding protein/healthy fats to carbohydrates to help better manage his blood sugars. Reviewed protein/healthy fat ideas to add. Gave Heart Healthy Tips handout to review at home and follow up with questions as needed.     Signature Nora Acosta, NILAN, LD

## 2024-09-06 ENCOUNTER — CLINICAL SUPPORT (OUTPATIENT)
Dept: CARDIAC REHAB | Facility: CLINIC | Age: 74
End: 2024-09-06
Payer: MEDICARE

## 2024-09-06 DIAGNOSIS — Z95.1 S/P CABG X 1: ICD-10-CM

## 2024-09-06 PROCEDURE — 93798 PHYS/QHP OP CAR RHAB W/ECG: CPT | Performed by: INTERNAL MEDICINE

## 2024-09-09 ENCOUNTER — CLINICAL SUPPORT (OUTPATIENT)
Dept: CARDIAC REHAB | Facility: CLINIC | Age: 74
End: 2024-09-09
Payer: MEDICARE

## 2024-09-09 DIAGNOSIS — Z95.1 S/P CABG X 1: ICD-10-CM

## 2024-09-09 PROCEDURE — 93798 PHYS/QHP OP CAR RHAB W/ECG: CPT | Performed by: INTERNAL MEDICINE

## 2024-09-11 ENCOUNTER — CLINICAL SUPPORT (OUTPATIENT)
Dept: CARDIAC REHAB | Facility: CLINIC | Age: 74
End: 2024-09-11
Payer: MEDICARE

## 2024-09-11 DIAGNOSIS — Z95.1 S/P CABG X 1: ICD-10-CM

## 2024-09-11 PROCEDURE — 93798 PHYS/QHP OP CAR RHAB W/ECG: CPT | Performed by: INTERNAL MEDICINE

## 2024-09-13 ENCOUNTER — CLINICAL SUPPORT (OUTPATIENT)
Dept: CARDIAC REHAB | Facility: CLINIC | Age: 74
End: 2024-09-13
Payer: MEDICARE

## 2024-09-13 DIAGNOSIS — Z95.1 S/P CABG X 1: ICD-10-CM

## 2024-09-13 PROCEDURE — 93798 PHYS/QHP OP CAR RHAB W/ECG: CPT | Performed by: INTERNAL MEDICINE

## 2024-09-16 ENCOUNTER — CLINICAL SUPPORT (OUTPATIENT)
Dept: CARDIAC REHAB | Facility: CLINIC | Age: 74
End: 2024-09-16
Payer: MEDICARE

## 2024-09-16 DIAGNOSIS — Z95.1 S/P CABG X 1: ICD-10-CM

## 2024-09-16 PROCEDURE — 93798 PHYS/QHP OP CAR RHAB W/ECG: CPT | Performed by: INTERNAL MEDICINE

## 2024-09-18 ENCOUNTER — CLINICAL SUPPORT (OUTPATIENT)
Dept: CARDIAC REHAB | Facility: CLINIC | Age: 74
End: 2024-09-18
Payer: MEDICARE

## 2024-09-18 ENCOUNTER — DOCUMENTATION (OUTPATIENT)
Dept: CARDIAC REHAB | Facility: CLINIC | Age: 74
End: 2024-09-18
Payer: MEDICARE

## 2024-09-18 DIAGNOSIS — Z95.1 S/P CABG X 1: ICD-10-CM

## 2024-09-18 PROCEDURE — 93798 PHYS/QHP OP CAR RHAB W/ECG: CPT | Performed by: INTERNAL MEDICINE

## 2024-09-18 NOTE — PROGRESS NOTES
Cardiac Rehabilitation 30 Day Reassessment    Name: Edward D Hume  Medical Record Number: [id number]  YOB: 1950  Age: 74 y.o.    Today’s Date: 9/18/2024  Primary Care Physician: Roselia Ibrahim MD  Referring Physician: Jerrod Anthony MD  Program Location: [address]  Primary Diagnosis: CABG     Onset/Date of Diagnosis: 5/2/2024    Session #:  8    AACVPR Risk Stratification: Moderate    Falls Risk: Medium  Psychosocial Assessment   Initial PHQ-9:  4  Sent PH-Q 9 to MD if score > 20: No; score < 20    Pt reported/currently experiencing stress: No  Patient uses stress management skills: No   History of: no history of anxiety or depression  Currently seeing a mental health provider: Yes, Then provider name: in progress  Social Support: Yes, Whom:in progress  Quality of Life Survey: SF-36   SF-36 Pre Post   Physical Component Score TBD TBD   Mental Component Score TBD TBD     Learning Assessment:  Learning assessment/barriers: None  Preferred learning method: Auditory and Visual  Barriers: None    Stages of Change:  Action    Psychosocial Plan    Goal Status: In Progress    Psychosocial Goals: Demonstrating proper techniques for stress management and Maintain or lower PH-Q 9 score by discharge    Psychosocial Interventions/Education:   8/27/24 Initial PHQ-9 score reviewed w/ pt at [facility]      Nutrition Assessment:    Hyperlipidemia: Yes     Lipids:   Lab Results   Component Value Date    CHOL 140 02/23/2024    HDL 22.7 02/23/2024    LDLF 61 09/25/2023    TRIG 258 (H) 02/23/2024       Current Dietary Guidelines: Low fat, Low sodium  Barriers to dietary change: no    Diet Habit Survey: Picture Your Plate  Pre: 61  Post: To be done at discharge.    Diabetes Assessment    Lab Results   Component Value Date    HGBA1C 6.1 06/03/2024       History of Diabetes:  Pt monitors BS at home: Yes   Frequency: 0 /day  FBS range: unknown -   Hypoglycemic Episodes:  unknown    Weight  "Management  Height: 180.3 cm (5' 10.98\")  Weight: 90.3 kg (199 lb)  BMI (Calculated): 27.77    Nutrition Plan    Goal Status: In Progress    Nutrition Goals: Lipid Goal: HDL>45, LDL <70, Total <180, Trigs <150, Improve Diet Habit Survey score by 5-10 points by discharge, Adapt a low-sodium, DASH diet prior to discharge, and Adapt a Mediterranean focused diet prior to discharge    Nutrition Interventions/Education:   9/4/2024  Gave and reviewed PYP with patient. PYP score 61. Pt has made changes to his diet since completing the PYP. He has decreased his intake of processed meats in the morning and now eats oatmeal with banana. Reviewed with him adding protein/healthy fats to carbohydrates to help better manage his blood sugars. Reviewed protein/healthy fat ideas to add. Gave Heart Healthy Tips handout to review at home and follow up with questions as needed. /Signature Nora Acosta RDN, REYNALDO      Exercise Assessment    Home Exercise:  No  Mode: NA  Frequency: NA  Duration: NA    Exercise Prescription     Exercise Prescription based on: Duke Activity Status Index (DASI)    DASI Score: 31.45  MET Score: 6.6   Frequency:  3 days/week   Mode: Treadmill, NuStep, and Recumbent Cycle   Duration: 27 total aerobic minutes   Intensity: RPE 12-16  Target HR:    MET Level: 2.9  Patient wears supplemental O2: No     Modality Workload METs Duration (minutes)   1 Pre-Exercise   2:00   2 Treadmill 2.5 mph @ 1% 3.3 10:00   3 NuStep 5@64  2.7 10:00   4 Recumbent Bike 5@55  3.1 10:00   5 Cooldown    5 :00   6 Post-Exercise   2:00     Resistance Training: No   Home Exercise Prescription given: yes    Exercise Plan    Goal Status: In progress    Exercise Goals: Increase exercise MET level by 5-10% each week, Increase total exercise duration to 30-45 minutes, and Obtain 150 minutes/week of moderate intensity aerobic exercise    Exercise Interventions/Education:   9/11/24 adjusted THR/MS  9/13/24 gave pt home ex rx & log, " encouraged pt to exercise on non-rehab days/MS      Other Core Components/Risk Factor Assessment:    Medication adherence  Current Medications:   Medication Documentation Review Audit       Reviewed by She Stout RN (Registered Nurse) on 08/27/24 at 0902      Medication Order Taking? Sig Documenting Provider Last Dose Status   albuterol 90 mcg/actuation inhaler [id number] Yes Inhale 2 puffs every 6 hours if needed for shortness of breath. Pj Ivy DO Taking Active   allopurinol (Zyloprim) 300 mg tablet [id number] Yes Take 1 tablet (300 mg) by mouth once daily. Pj Ivy DO Taking Active   amLODIPine (Norvasc) 10 mg tablet [id number] Yes Take 1 tablet (10 mg) by mouth once daily. Pj Ivy DO Taking Active   aspirin 81 mg EC tablet [id number] Yes Take 1 tablet (81 mg) by mouth once daily. Pj Ivy DO Taking Active   atorvastatin (Lipitor) 80 mg tablet [id number] Yes Take 1 tablet (80 mg) by mouth once daily at bedtime. Theresa Carpenter APRN-CNP Taking Active   biotin 10 mg tablet [id number] Yes Take 1 tablet (10 mg) by mouth once daily. Historical Provider, MD Taking Active   cranberry fruit extract (cranberry extract) 300 mg tablet [id number] Yes Take 650 mg by mouth once daily. Historical Provider, MD Taking Active   famotidine (Pepcid) 20 mg tablet [id number] Yes Take 1 tablet (20 mg) by mouth once daily at bedtime. Pj Ivy DO Taking Active   gemfibrozil (Lopid) 600 mg tablet [id number] Yes Take 1 tablet (600 mg) by mouth 2 times a day. Pj Ivy DO Taking Active   glipiZIDE XL (Glucotrol XL) 10 mg 24 hr tablet [id number]  Take 2 tablets (20 mg) by mouth once daily. Pj Ivy DO  Active   insulin glargine (Toujeo SoloStar U-300 Insulin) 300 unit/mL (1.5 mL) injection [id number] Yes Inject 36 Units under the skin once daily at bedtime. Take as directed per insulin instructions. Pj Ivy DO Taking Active   Januvia 100 mg tablet [id number] Yes Take 1 tablet (100 mg) by  "mouth once daily. Pj Ivy DO Taking Active   lidocaine 4 % cream [id number] Yes Apply topically 4 times a day as needed for mild pain (1 - 3). Pj Ivy DO Taking Active   metoprolol tartrate (Lopressor) 25 mg tablet [id number] Yes Take 1 tablet (25 mg) by mouth 2 times a day. Theresa Carpenter, APRN-CNP Taking Active   omeprazole (PriLOSEC) 40 mg DR capsule [id number] Yes Take 1 capsule (40 mg) by mouth once daily in the morning. Take before meals. Pj Ivy DO Taking Active   pen needle, diabetic (BD Ultra-Fine Short Pen Needle) 31 gauge x 5/16\" needle [id number] Yes USE AS DIRECTED Pj Ivy DO Taking Active   tamsulosin (Flomax) 0.4 mg 24 hr capsule [id number] Yes Take 1 capsule (0.4 mg) by mouth 2 times a day. Pj Ivy DO Taking Active   valsartan-hydrochlorothiazide (Diovan-HCT) 160-12.5 mg tablet [id number] Yes Take 1 tablet by mouth once daily. Pj Ivy DO Taking Active                                 Medication compliance: Yes   Uses pill box/organizer: No    Carries medication list: Yes     Blood Pressure Management  History of Hypertension: Yes   Medication Changes: No   Resting BP:  WNL    Heart Failure Management  Hx of Heart Failure: No    Smoking/Tobacco Assessment  Social History     Tobacco Use   Smoking Status Former    Types: Cigarettes   Smokeless Tobacco Never       Other Core Component Plan    Goal Status: In progress    Other Core Component Goals: Medication compliance, Verbalize medication usage and drug actions by discharge, and Achieve resting BP of < 130/80 by discharge    Other Core Component Interventions/Education:   9/11/24 gave pt CR book, reviewed lipid profile/MS    Individual Patient Goals:    Increase strength and endurance by discharge  Establish a regular exercise routine 3-5 days a week by discharge     Goal Status: In progress    Staff Comments:  Mr. Hume has been adherent to his exercise sessions since beginning the Cardiac Rehab Program.  He " has begun to progress on his outcomes and goals.  No cardiac complaints voiced.      Rehab Staff Signature: Dana Gracia MS, CCRP

## 2024-09-18 NOTE — PROGRESS NOTES
Cardiac Rehab Education  Edward D Hume   [id number]    9/18/2024  Exercise prescription and maintenance education was done during today's session. Discussed benefits of exercise, FITT principle, reviewed perceived exertion scale, and patient's THR was given for home practice with instructions on how to obtain. Handouts were given for personal reference. Home exercise prescription is to be given prior to discharge from program.     Signature Miriam Gant EP

## 2024-09-20 ENCOUNTER — CLINICAL SUPPORT (OUTPATIENT)
Dept: CARDIAC REHAB | Facility: CLINIC | Age: 74
End: 2024-09-20
Payer: MEDICARE

## 2024-09-20 DIAGNOSIS — Z95.1 S/P CABG X 1: ICD-10-CM

## 2024-09-20 PROCEDURE — 93798 PHYS/QHP OP CAR RHAB W/ECG: CPT | Performed by: INTERNAL MEDICINE

## 2024-09-23 ENCOUNTER — APPOINTMENT (OUTPATIENT)
Dept: CARDIAC REHAB | Facility: CLINIC | Age: 74
End: 2024-09-23
Payer: MEDICARE

## 2024-09-23 ENCOUNTER — APPOINTMENT (OUTPATIENT)
Dept: PRIMARY CARE | Facility: CLINIC | Age: 74
End: 2024-09-23
Payer: MEDICARE

## 2024-09-23 VITALS — SYSTOLIC BLOOD PRESSURE: 150 MMHG | WEIGHT: 213 LBS | DIASTOLIC BLOOD PRESSURE: 80 MMHG | BODY MASS INDEX: 29.72 KG/M2

## 2024-09-23 DIAGNOSIS — Z79.4 TYPE 2 DIABETES MELLITUS WITH STAGE 3A CHRONIC KIDNEY DISEASE, WITH LONG-TERM CURRENT USE OF INSULIN (MULTI): Chronic | ICD-10-CM

## 2024-09-23 DIAGNOSIS — Z79.4 TYPE 2 DIABETES MELLITUS WITHOUT COMPLICATION, WITH LONG-TERM CURRENT USE OF INSULIN (MULTI): Primary | ICD-10-CM

## 2024-09-23 DIAGNOSIS — E11.9 TYPE 2 DIABETES MELLITUS WITHOUT COMPLICATION, WITH LONG-TERM CURRENT USE OF INSULIN (MULTI): Primary | ICD-10-CM

## 2024-09-23 DIAGNOSIS — E11.22 TYPE 2 DIABETES MELLITUS WITH STAGE 3A CHRONIC KIDNEY DISEASE, WITH LONG-TERM CURRENT USE OF INSULIN (MULTI): Chronic | ICD-10-CM

## 2024-09-23 DIAGNOSIS — L90.5 POSTOPERATIVE SCAR: ICD-10-CM

## 2024-09-23 DIAGNOSIS — N40.0 BENIGN PROSTATIC HYPERPLASIA, UNSPECIFIED WHETHER LOWER URINARY TRACT SYMPTOMS PRESENT: ICD-10-CM

## 2024-09-23 DIAGNOSIS — E55.9 HYPOVITAMINOSIS D: ICD-10-CM

## 2024-09-23 DIAGNOSIS — Z98.890 POSTOPERATIVE SCAR: ICD-10-CM

## 2024-09-23 DIAGNOSIS — E11.9 TYPE 2 DIABETES MELLITUS WITHOUT COMPLICATIONS (MULTI): ICD-10-CM

## 2024-09-23 DIAGNOSIS — M10.9 GOUT, UNSPECIFIED CAUSE, UNSPECIFIED CHRONICITY, UNSPECIFIED SITE: ICD-10-CM

## 2024-09-23 DIAGNOSIS — K21.9 GASTROESOPHAGEAL REFLUX DISEASE WITHOUT ESOPHAGITIS: Chronic | ICD-10-CM

## 2024-09-23 DIAGNOSIS — I10 PRIMARY HYPERTENSION: Chronic | ICD-10-CM

## 2024-09-23 DIAGNOSIS — R06.02 SHORTNESS OF BREATH: ICD-10-CM

## 2024-09-23 DIAGNOSIS — Z95.1 S/P CABG X 1: ICD-10-CM

## 2024-09-23 DIAGNOSIS — E78.2 MIXED HYPERLIPIDEMIA: Chronic | ICD-10-CM

## 2024-09-23 DIAGNOSIS — N18.31 TYPE 2 DIABETES MELLITUS WITH STAGE 3A CHRONIC KIDNEY DISEASE, WITH LONG-TERM CURRENT USE OF INSULIN (MULTI): Chronic | ICD-10-CM

## 2024-09-23 DIAGNOSIS — Z23 FLU VACCINE NEED: ICD-10-CM

## 2024-09-23 DIAGNOSIS — I10 HYPERTENSION, UNSPECIFIED TYPE: ICD-10-CM

## 2024-09-23 DIAGNOSIS — N18.31 STAGE 3A CHRONIC KIDNEY DISEASE (MULTI): Chronic | ICD-10-CM

## 2024-09-23 PROCEDURE — G2211 COMPLEX E/M VISIT ADD ON: HCPCS | Performed by: INTERNAL MEDICINE

## 2024-09-23 PROCEDURE — 90662 IIV NO PRSV INCREASED AG IM: CPT | Performed by: INTERNAL MEDICINE

## 2024-09-23 PROCEDURE — 3044F HG A1C LEVEL LT 7.0%: CPT | Performed by: INTERNAL MEDICINE

## 2024-09-23 PROCEDURE — G0008 ADMIN INFLUENZA VIRUS VAC: HCPCS | Performed by: INTERNAL MEDICINE

## 2024-09-23 PROCEDURE — 3077F SYST BP >= 140 MM HG: CPT | Performed by: INTERNAL MEDICINE

## 2024-09-23 PROCEDURE — 3079F DIAST BP 80-89 MM HG: CPT | Performed by: INTERNAL MEDICINE

## 2024-09-23 PROCEDURE — 99214 OFFICE O/P EST MOD 30 MIN: CPT | Performed by: INTERNAL MEDICINE

## 2024-09-23 PROCEDURE — 3048F LDL-C <100 MG/DL: CPT | Performed by: INTERNAL MEDICINE

## 2024-09-23 RX ORDER — OMEPRAZOLE 40 MG/1
40 CAPSULE, DELAYED RELEASE ORAL
Qty: 30 CAPSULE | Refills: 4 | Status: SHIPPED | OUTPATIENT
Start: 2024-09-23

## 2024-09-23 RX ORDER — METOPROLOL TARTRATE 25 MG/1
25 TABLET, FILM COATED ORAL 2 TIMES DAILY
Qty: 180 TABLET | Refills: 3 | Status: SHIPPED | OUTPATIENT
Start: 2024-09-23 | End: 2025-09-23

## 2024-09-23 RX ORDER — LIDOCAINE 40 MG/G
CREAM TOPICAL 4 TIMES DAILY PRN
Qty: 30 G | Refills: 1 | Status: SHIPPED | OUTPATIENT
Start: 2024-09-23 | End: 2025-09-23

## 2024-09-23 RX ORDER — ALBUTEROL SULFATE 90 UG/1
2 INHALANT RESPIRATORY (INHALATION) EVERY 6 HOURS PRN
Qty: 18 G | Refills: 2 | Status: SHIPPED | OUTPATIENT
Start: 2024-09-23

## 2024-09-23 RX ORDER — AMLODIPINE BESYLATE 10 MG/1
10 TABLET ORAL DAILY
Qty: 30 TABLET | Refills: 4 | Status: SHIPPED | OUTPATIENT
Start: 2024-09-23

## 2024-09-23 RX ORDER — GLIPIZIDE 10 MG/1
20 TABLET, FILM COATED, EXTENDED RELEASE ORAL DAILY
Qty: 60 TABLET | Refills: 2 | Status: SHIPPED | OUTPATIENT
Start: 2024-09-23

## 2024-09-23 RX ORDER — PEN NEEDLE, DIABETIC 30 GX3/16"
NEEDLE, DISPOSABLE MISCELLANEOUS
Qty: 100 EACH | Refills: 3 | Status: SHIPPED | OUTPATIENT
Start: 2024-09-23

## 2024-09-23 RX ORDER — SITAGLIPTIN 100 MG/1
100 TABLET, FILM COATED ORAL DAILY
Qty: 30 TABLET | Refills: 3 | Status: SHIPPED | OUTPATIENT
Start: 2024-09-23

## 2024-09-23 RX ORDER — TAMSULOSIN HYDROCHLORIDE 0.4 MG/1
0.4 CAPSULE ORAL DAILY
Qty: 180 CAPSULE | Refills: 0 | Status: SHIPPED | OUTPATIENT
Start: 2024-09-23

## 2024-09-23 RX ORDER — GEMFIBROZIL 600 MG/1
600 TABLET, FILM COATED ORAL 2 TIMES DAILY
Qty: 60 TABLET | Refills: 2 | Status: SHIPPED | OUTPATIENT
Start: 2024-09-23

## 2024-09-23 RX ORDER — ASPIRIN 81 MG/1
81 TABLET ORAL DAILY
Qty: 30 TABLET | Refills: 4 | Status: SHIPPED | OUTPATIENT
Start: 2024-09-23

## 2024-09-23 RX ORDER — FAMOTIDINE 20 MG/1
20 TABLET, FILM COATED ORAL NIGHTLY
Qty: 30 TABLET | Refills: 4 | Status: SHIPPED | OUTPATIENT
Start: 2024-09-23

## 2024-09-23 RX ORDER — INSULIN GLARGINE 300 [IU]/ML
36 INJECTION, SOLUTION SUBCUTANEOUS NIGHTLY
Qty: 10.8 ML | Refills: 1 | Status: SHIPPED | OUTPATIENT
Start: 2024-09-23 | End: 2025-03-22

## 2024-09-23 RX ORDER — ALLOPURINOL 300 MG/1
300 TABLET ORAL DAILY
Qty: 30 TABLET | Refills: 4 | Status: SHIPPED | OUTPATIENT
Start: 2024-09-23

## 2024-09-23 RX ORDER — VALSARTAN AND HYDROCHLOROTHIAZIDE 160; 12.5 MG/1; MG/1
1 TABLET, FILM COATED ORAL DAILY
Qty: 30 TABLET | Refills: 4 | Status: SHIPPED | OUTPATIENT
Start: 2024-09-23

## 2024-09-23 ASSESSMENT — ENCOUNTER SYMPTOMS
OCCASIONAL FEELINGS OF UNSTEADINESS: 0
VOMITING: 0
WOUND: 0
SHORTNESS OF BREATH: 0
LOSS OF SENSATION IN FEET: 1
DYSURIA: 0
DIARRHEA: 0
DIZZINESS: 0
NUMBNESS: 0
ABDOMINAL PAIN: 0
ARTHRALGIAS: 1
DEPRESSION: 0
LIGHT-HEADEDNESS: 0
WHEEZING: 0
ABDOMINAL DISTENTION: 0
PALPITATIONS: 0
CHILLS: 0
FEVER: 0
NAUSEA: 0
DIFFICULTY URINATING: 0
COUGH: 0

## 2024-09-23 ASSESSMENT — PATIENT HEALTH QUESTIONNAIRE - PHQ9
SUM OF ALL RESPONSES TO PHQ9 QUESTIONS 1 AND 2: 0
2. FEELING DOWN, DEPRESSED OR HOPELESS: NOT AT ALL
1. LITTLE INTEREST OR PLEASURE IN DOING THINGS: NOT AT ALL

## 2024-09-23 NOTE — ASSESSMENT & PLAN NOTE
- elevated BP today 150/80 but states that -130 range at home  - continue amlodipine 10 mg, Diovan 160-12.5 mg, Lopressor 25 mg

## 2024-09-23 NOTE — PROGRESS NOTES
Subjective   Subjective:     History Of Present Illness:  Edward D Hume is a 74 y.o. male with a PMH of HTN, HLD, OA, DM type 2, GERD, and Gout, who presents to [facility] for follow up appointment.  Today, patient states that he does not have any active complaints and that he is doing good.  He denies any recent hospitalizations, recent infections, or recent antibiotic use.  He continues to deny DEXA scan due to his own preference.  He received his annual flu shot.      Past Medical History:  He has a past medical history of Acute nonparalytic poliomyelitis ([facility]), Awareness under anesthesia, Body mass index (BMI) 25.0-25.9, adult (10/26/2019), Body mass index (BMI) 27.0-27.9, adult (09/22/2021), Body mass index (BMI) 29.0-29.9, adult (08/31/2019), CKD (chronic kidney disease), Crushing injury of unspecified finger(s), initial encounter (06/12/2014), Diabetes mellitus (Multi), Disorder of the skin and subcutaneous tissue, unspecified (09/04/2019), Disorder of the skin and subcutaneous tissue, unspecified (03/30/2016), Elevated prostate specific antigen (PSA), GERD (gastroesophageal reflux disease), Gout, History of falling (06/27/2016), HLD (hyperlipidemia), HTN (hypertension), Other chest pain (03/07/2018), Other conditions influencing health status (02/22/2017), Other conditions influencing health status (03/04/2016), Other conditions influencing health status, Personal history of other mental and behavioral disorders (11/06/2018), Personal history of other specified conditions (04/16/2019), Personal history of other specified conditions (04/16/2019), Personal history of other specified conditions (06/29/2016), Puncture wound without foreign body of left hand, initial encounter (03/05/2019), Puncture wound without foreign body of unspecified hand, initial encounter (03/05/2019), Superficial mycosis, unspecified (12/01/2014), Trigger finger, left middle finger (07/24/2017), Trigger finger, left middle  finger (07/24/2017), Trigger finger, left ring finger (07/24/2017), Trigger finger, right index finger (07/24/2017), and Trigger finger, right middle finger (11/10/2017).    Past Surgical History:  He has a past surgical history that includes Hand tendon surgery (02/15/2013); Other surgical history (04/09/2013); Other surgical history (07/02/2020); Other surgical history (12/06/2017); Other surgical history (06/12/2013); Other surgical history (06/12/2013); Shoulder surgery (06/12/2013); Cardiac catheterization; Knee arthroscopy w/ debridement; Cardiac catheterization (N/A, 05/01/2024); and Coronary artery bypass graft.     Social History:  He reports that he has quit smoking. His smoking use included cigarettes. He has never used smokeless tobacco. He reports that he does not currently use alcohol. He reports that he does not use drugs.    Family History:  No family history on file.  Allergies:  Amoxicillin, Metformin, and Iodinated contrast media    Home Medications:  (Not in a hospital admission)    Review Of Systems:  11-point ROS was performed and is negative except as noted below and in the HPI.     Review of Systems   Constitutional:  Negative for chills and fever.   Respiratory:  Negative for cough, shortness of breath and wheezing.    Cardiovascular:  Negative for chest pain, palpitations and leg swelling.   Gastrointestinal:  Negative for abdominal distention, abdominal pain, diarrhea, nausea and vomiting.   Genitourinary:  Negative for difficulty urinating and dysuria.   Musculoskeletal:  Positive for arthralgias.   Skin:  Negative for rash and wound.   Neurological:  Negative for dizziness, light-headedness and numbness.        Objective   Objective:     /80   Wt 96.6 kg (213 lb)   BMI 29.72 kg/m²     Physical Exam  Constitutional:       General: He is not in acute distress.     Appearance: Normal appearance.   HENT:      Head: Normocephalic and atraumatic.      Mouth/Throat:      Mouth: Mucous  membranes are moist.   Eyes:      Conjunctiva/sclera: Conjunctivae normal.      Pupils: Pupils are equal, round, and reactive to light.   Cardiovascular:      Rate and Rhythm: Normal rate and regular rhythm.      Heart sounds: Normal heart sounds.   Pulmonary:      Effort: No respiratory distress.      Breath sounds: Normal breath sounds. No wheezing or rhonchi.   Abdominal:      General: Bowel sounds are normal.      Palpations: Abdomen is soft.      Tenderness: There is no abdominal tenderness.   Musculoskeletal:         General: No swelling.      Cervical back: Neck supple.   Skin:     General: Skin is warm and dry.   Neurological:      General: No focal deficit present.      Mental Status: He is alert.          Assessment & Plan:     Assessment/Plan     Problem List Items Addressed This Visit       Stage 3a chronic kidney disease (Multi) (Chronic)     - Cr at baseline  - likely due to T2DM         Gastroesophageal reflux disease without esophagitis (Chronic)     - well controlled  - continue Omeprazole and Famotidine         Mixed hyperlipidemia (Chronic)     - elevatd ASCVD score  - calcium score >1700 in 2022  - continue Atorvastatin 80 mg         Primary hypertension (Chronic)     - elevated BP today 150/80 but states that -130 range at home  - continue amlodipine 10 mg, Diovan 160-12.5 mg, Lopressor 25 mg         Type 2 diabetes mellitus with stage 3a chronic kidney disease, with long-term current use of insulin (Multi) (Chronic)     - A1c score 6.1% 3 months ago, will recheck today  - continue Januvia 100 mg and Toujeo         S/P CABG x 1     Other Visit Diagnoses       Type 2 diabetes mellitus without complication, with long-term current use of insulin (Multi)    -  Primary    Relevant Orders    CBC    Comprehensive Metabolic Panel    Cholesterol, LDL Direct    Lipid Panel Non-Fasting    TSH with reflex to Free T4 if abnormal    Albumin-Creatinine Ratio, Urine Random    Vitamin D 25-Hydroxy,Total  (for eval of Vitamin D levels)    Hemoglobin A1C    Hypovitaminosis D        Relevant Orders    Vitamin D 25-Hydroxy,Total (for eval of Vitamin D levels)          Health Maintenance:  - routine labs today  - received Flu shot today  - continues to refuse DEXA scan    Pj Ivy, PGY-3  Internal Medicine     Disclaimer: Documentation completed with the information available at the time of input. The times in the chart may not be reflective of actual patient care times, interventions, or procedures. Documentation occurs after the physical care of the patient.

## 2024-09-23 NOTE — PROGRESS NOTES
I reviewed the resident/fellow's documentation and discussed the patient with the resident/fellow. I agree with the resident/fellow's medical decision making as documented in the note.  As the attending physician, I certify that I personally reviewed the patient's history and personally examined the patient to confirm the physical findings described above, and that I reviewed the relevant imaging studies and available reports. I also discussed the differential diagnosis and all of the proposed management plans with the patient and individuals accompanying the patient to this visit. They had the opportunity to ask questions about the proposed management plans and to have those questions answered.     Roselia Ibrahim MD

## 2024-09-24 ENCOUNTER — LAB (OUTPATIENT)
Dept: LAB | Facility: LAB | Age: 74
End: 2024-09-24
Payer: MEDICARE

## 2024-09-24 DIAGNOSIS — Z79.4 TYPE 2 DIABETES MELLITUS WITHOUT COMPLICATION, WITH LONG-TERM CURRENT USE OF INSULIN (MULTI): ICD-10-CM

## 2024-09-24 DIAGNOSIS — E55.9 HYPOVITAMINOSIS D: ICD-10-CM

## 2024-09-24 DIAGNOSIS — E11.9 TYPE 2 DIABETES MELLITUS WITHOUT COMPLICATION, WITH LONG-TERM CURRENT USE OF INSULIN (MULTI): ICD-10-CM

## 2024-09-24 LAB
25(OH)D3 SERPL-MCNC: 44 NG/ML (ref 30–100)
ALBUMIN SERPL BCP-MCNC: 4.6 G/DL (ref 3.4–5)
ALP SERPL-CCNC: 55 U/L (ref 33–136)
ALT SERPL W P-5'-P-CCNC: 20 U/L (ref 10–52)
ANION GAP SERPL CALC-SCNC: 14 MMOL/L (ref 10–20)
AST SERPL W P-5'-P-CCNC: 19 U/L (ref 9–39)
BILIRUB SERPL-MCNC: 0.6 MG/DL (ref 0–1.2)
BUN SERPL-MCNC: 24 MG/DL (ref 6–23)
CALCIUM SERPL-MCNC: 9.3 MG/DL (ref 8.6–10.6)
CHLORIDE SERPL-SCNC: 103 MMOL/L (ref 98–107)
CHOLEST SERPL-MCNC: 120 MG/DL (ref 0–199)
CHOLESTEROL/HDL RATIO: 4.1
CO2 SERPL-SCNC: 27 MMOL/L (ref 21–32)
CREAT SERPL-MCNC: 1.11 MG/DL (ref 0.5–1.3)
CREAT UR-MCNC: 136.3 MG/DL (ref 20–370)
EGFRCR SERPLBLD CKD-EPI 2021: 71 ML/MIN/1.73M*2
ERYTHROCYTE [DISTWIDTH] IN BLOOD BY AUTOMATED COUNT: 12.7 % (ref 11.5–14.5)
EST. AVERAGE GLUCOSE BLD GHB EST-MCNC: 134 MG/DL
GLUCOSE SERPL-MCNC: 147 MG/DL (ref 74–99)
HBA1C MFR BLD: 6.3 %
HCT VFR BLD AUTO: 39.9 % (ref 41–52)
HDLC SERPL-MCNC: 29.3 MG/DL
HGB BLD-MCNC: 13.6 G/DL (ref 13.5–17.5)
LDLC SERPL DIRECT ASSAY-MCNC: 62 MG/DL (ref 0–129)
MCH RBC QN AUTO: 31.5 PG (ref 26–34)
MCHC RBC AUTO-ENTMCNC: 34.1 G/DL (ref 32–36)
MCV RBC AUTO: 92 FL (ref 80–100)
MICROALBUMIN UR-MCNC: 214.5 MG/L
MICROALBUMIN/CREAT UR: 157.4 UG/MG CREAT
NON-HDL CHOLESTEROL: 91 MG/DL (ref 0–149)
NRBC BLD-RTO: 0 /100 WBCS (ref 0–0)
PLATELET # BLD AUTO: 179 X10*3/UL (ref 150–450)
POTASSIUM SERPL-SCNC: 4.3 MMOL/L (ref 3.5–5.3)
PROT SERPL-MCNC: 7.1 G/DL (ref 6.4–8.2)
RBC # BLD AUTO: 4.32 X10*6/UL (ref 4.5–5.9)
SODIUM SERPL-SCNC: 140 MMOL/L (ref 136–145)
TSH SERPL-ACNC: 1.89 MIU/L (ref 0.44–3.98)
WBC # BLD AUTO: 6.1 X10*3/UL (ref 4.4–11.3)

## 2024-09-24 PROCEDURE — 83036 HEMOGLOBIN GLYCOSYLATED A1C: CPT

## 2024-09-24 PROCEDURE — 36415 COLL VENOUS BLD VENIPUNCTURE: CPT

## 2024-09-24 PROCEDURE — 84443 ASSAY THYROID STIM HORMONE: CPT

## 2024-09-24 PROCEDURE — 82465 ASSAY BLD/SERUM CHOLESTEROL: CPT

## 2024-09-24 PROCEDURE — 83718 ASSAY OF LIPOPROTEIN: CPT

## 2024-09-24 PROCEDURE — 82306 VITAMIN D 25 HYDROXY: CPT

## 2024-09-24 PROCEDURE — 85027 COMPLETE CBC AUTOMATED: CPT

## 2024-09-24 PROCEDURE — 83721 ASSAY OF BLOOD LIPOPROTEIN: CPT

## 2024-09-24 PROCEDURE — 80053 COMPREHEN METABOLIC PANEL: CPT

## 2024-09-24 PROCEDURE — 82043 UR ALBUMIN QUANTITATIVE: CPT

## 2024-09-24 PROCEDURE — 82570 ASSAY OF URINE CREATININE: CPT

## 2024-09-25 ENCOUNTER — CLINICAL SUPPORT (OUTPATIENT)
Dept: CARDIAC REHAB | Facility: CLINIC | Age: 74
End: 2024-09-25
Payer: MEDICARE

## 2024-09-25 DIAGNOSIS — Z95.1 S/P CABG X 1: ICD-10-CM

## 2024-09-25 PROCEDURE — 93798 PHYS/QHP OP CAR RHAB W/ECG: CPT | Performed by: INTERNAL MEDICINE

## 2024-09-25 NOTE — PROGRESS NOTES
Cardiac Rehab Education  Edward D Hume   [id number]    9/25/2024  Coronary artery disease education was done during today's session. Signs and symptoms of possible heart attack, risk factors for prevention, and lifestyle behavior modifications were all explained. Handout for personal use was given.     Signature Miriam Gant EP

## 2024-09-27 ENCOUNTER — CLINICAL SUPPORT (OUTPATIENT)
Dept: CARDIAC REHAB | Facility: CLINIC | Age: 74
End: 2024-09-27
Payer: MEDICARE

## 2024-09-27 DIAGNOSIS — Z95.1 S/P CABG X 1: ICD-10-CM

## 2024-09-27 PROCEDURE — 93798 PHYS/QHP OP CAR RHAB W/ECG: CPT | Performed by: INTERNAL MEDICINE

## 2024-09-30 ENCOUNTER — CLINICAL SUPPORT (OUTPATIENT)
Dept: CARDIAC REHAB | Facility: CLINIC | Age: 74
End: 2024-09-30
Payer: MEDICARE

## 2024-09-30 DIAGNOSIS — Z95.1 S/P CABG X 1: ICD-10-CM

## 2024-09-30 PROCEDURE — 93798 PHYS/QHP OP CAR RHAB W/ECG: CPT | Performed by: INTERNAL MEDICINE

## 2024-10-01 ENCOUNTER — OFFICE VISIT (OUTPATIENT)
Dept: CARDIOLOGY | Facility: HOSPITAL | Age: 74
End: 2024-10-01
Payer: MEDICARE

## 2024-10-01 VITALS
HEART RATE: 74 BPM | DIASTOLIC BLOOD PRESSURE: 71 MMHG | HEIGHT: 72 IN | OXYGEN SATURATION: 96 % | BODY MASS INDEX: 28.81 KG/M2 | SYSTOLIC BLOOD PRESSURE: 138 MMHG | WEIGHT: 212.7 LBS

## 2024-10-01 DIAGNOSIS — Z95.1 S/P CABG X 1: ICD-10-CM

## 2024-10-01 DIAGNOSIS — E78.2 MIXED HYPERLIPIDEMIA: Chronic | ICD-10-CM

## 2024-10-01 DIAGNOSIS — I10 PRIMARY HYPERTENSION: Primary | Chronic | ICD-10-CM

## 2024-10-01 PROCEDURE — 3078F DIAST BP <80 MM HG: CPT | Performed by: NURSE PRACTITIONER

## 2024-10-01 PROCEDURE — 1159F MED LIST DOCD IN RCRD: CPT | Performed by: NURSE PRACTITIONER

## 2024-10-01 PROCEDURE — 99214 OFFICE O/P EST MOD 30 MIN: CPT | Performed by: NURSE PRACTITIONER

## 2024-10-01 PROCEDURE — 1036F TOBACCO NON-USER: CPT | Performed by: NURSE PRACTITIONER

## 2024-10-01 PROCEDURE — 3060F POS MICROALBUMINURIA REV: CPT | Performed by: NURSE PRACTITIONER

## 2024-10-01 PROCEDURE — 1160F RVW MEDS BY RX/DR IN RCRD: CPT | Performed by: NURSE PRACTITIONER

## 2024-10-01 PROCEDURE — 3048F LDL-C <100 MG/DL: CPT | Performed by: NURSE PRACTITIONER

## 2024-10-01 PROCEDURE — 3075F SYST BP GE 130 - 139MM HG: CPT | Performed by: NURSE PRACTITIONER

## 2024-10-01 PROCEDURE — 3044F HG A1C LEVEL LT 7.0%: CPT | Performed by: NURSE PRACTITIONER

## 2024-10-01 PROCEDURE — 3008F BODY MASS INDEX DOCD: CPT | Performed by: NURSE PRACTITIONER

## 2024-10-01 NOTE — PROGRESS NOTES
Subjective   Edward D Hume is a 74 y.o. male.    Chief Complaint:  Hypertension, Hyperlipidemia, and Coronary Artery Disease    Mr. Hume returns for a routine 2 month follow up. He is seen in collaboration with Dr. Anthony. He has been feeling well from a cardiac standpoint. He has remained compliant with his medications, denying any intolerances. He is active in cardiac rehab, denying any exertional chest pain or shortness of breath. He is still complaining of some chest soreness, though just along his incision. He also complains of fatigue, though feels this is from not sleeping well. He denies any recent ER visits or hospitalizations. He offers no specific cardiovascular complaints or concerns today. He denies any complaints of chest pain, shortness of breath, lightheadedness, dizziness, palpitations, syncope, orthopnea, paroxysmal nocturnal dyspnea, lower extremity swelling or bleeding concerns.          Review of Systems   All other systems reviewed and are negative.      Objective   Physical Exam  Constitutional:       Appearance: Healthy appearance. In no distress  Pulmonary:      Effort: Pulmonary effort is normal.      Breath sounds: Normal breath sounds.   Cardiovascular:      Normal rate. Regular rhythm. Normal S1. Normal S2.       Murmurs: There is no murmur.      Carotids: right carotid pulse +2, no bruit heard over the right carotid. left carotid pulse +2, no bruit heard over the left carotid.  Edema:     Peripheral edema absent.   Abdominal:      Palpations: Abdomen is soft.   Musculoskeletal:       Cervical back: Normal range of motion.   Skin:     General: Skin is warm and dry. Normal color and pigmentation   Neurological:      Mental Status: Alert and oriented to person, place and time.   Psychiatric:     Mood and Affect: appropriate mood and appropriate affect.     Lab Review:   Lab Results   Component Value Date     09/24/2024    K 4.3 09/24/2024     09/24/2024    CO2 27 09/24/2024     BUN 24 (H) 09/24/2024    CREATININE 1.11 09/24/2024    GLUCOSE 147 (H) 09/24/2024    CALCIUM 9.3 09/24/2024     Lab Results   Component Value Date    WBC 6.1 09/24/2024    HGB 13.6 09/24/2024    HCT 39.9 (L) 09/24/2024    MCV 92 09/24/2024     09/24/2024     Lab Results   Component Value Date    CHOL 120 09/24/2024    TRIG 258 (H) 02/23/2024    HDL 29.3 09/24/2024    LDLDIRECT 62 09/24/2024       Assessment/Plan   Mr. Hume is a 74 year old  male with a past medical history significant for hypertension, hyperlipidemia, CKD, T2DM, GERD and elevated CACS of 1736.09 (6/2022). Heart catheterization 5/2024 showed severe 2-vessel disease with normal LV systolic function, now s/p CABG (MIDCAB) with Dr. Bhumi Price with [facility] 5/2/24. He presents today for routine follow up stable from a cardiac standpoint. His VS remain stable. He remains on appropriate antiplatelet and lipid lowering therapy with his LDL at goal. He remains active in cardiac rehab, denying any exertional chest pain or shortness of breath. I will have him continue all medications unchanged. He will follow up with us in clinic in 6 months. He knows to call for any concerns.

## 2024-10-02 ENCOUNTER — CLINICAL SUPPORT (OUTPATIENT)
Dept: CARDIAC REHAB | Facility: CLINIC | Age: 74
End: 2024-10-02
Payer: MEDICARE

## 2024-10-02 DIAGNOSIS — Z95.1 S/P CABG X 1: ICD-10-CM

## 2024-10-02 PROCEDURE — 93798 PHYS/QHP OP CAR RHAB W/ECG: CPT | Performed by: INTERNAL MEDICINE

## 2024-10-02 NOTE — PROGRESS NOTES
Edward D Hume  1950  [id number]  74 y.o.    [facility] [place] [id number]      Cardiac/Pulmonary Rehab Nutrition Education    10/2/2024  Label reading education was done with program's RDN during today's visit. Label Reading Highlights handouts was given for review of label contents.     Signature Nora Acosta RDN, LD

## 2024-10-03 DIAGNOSIS — K21.9 GASTROESOPHAGEAL REFLUX DISEASE WITHOUT ESOPHAGITIS: Chronic | ICD-10-CM

## 2024-10-03 RX ORDER — OMEPRAZOLE 40 MG/1
40 CAPSULE, DELAYED RELEASE ORAL
Qty: 30 CAPSULE | Refills: 4 | Status: SHIPPED | OUTPATIENT
Start: 2024-10-03

## 2024-10-04 ENCOUNTER — CLINICAL SUPPORT (OUTPATIENT)
Dept: CARDIAC REHAB | Facility: CLINIC | Age: 74
End: 2024-10-04
Payer: MEDICARE

## 2024-10-04 DIAGNOSIS — Z95.1 S/P CABG X 1: ICD-10-CM

## 2024-10-04 PROCEDURE — 93798 PHYS/QHP OP CAR RHAB W/ECG: CPT | Performed by: INTERNAL MEDICINE

## 2024-10-07 ENCOUNTER — CLINICAL SUPPORT (OUTPATIENT)
Dept: CARDIAC REHAB | Facility: CLINIC | Age: 74
End: 2024-10-07
Payer: MEDICARE

## 2024-10-07 ENCOUNTER — OFFICE VISIT (OUTPATIENT)
Dept: URGENT CARE | Age: 74
End: 2024-10-07
Payer: MEDICARE

## 2024-10-07 VITALS
TEMPERATURE: 97.9 F | OXYGEN SATURATION: 97 % | HEIGHT: 72 IN | SYSTOLIC BLOOD PRESSURE: 145 MMHG | BODY MASS INDEX: 27.09 KG/M2 | HEART RATE: 97 BPM | DIASTOLIC BLOOD PRESSURE: 59 MMHG | WEIGHT: 200 LBS | RESPIRATION RATE: 16 BRPM

## 2024-10-07 DIAGNOSIS — Z95.1 S/P CABG X 1: ICD-10-CM

## 2024-10-07 DIAGNOSIS — T14.8XXA ABRASION: Primary | ICD-10-CM

## 2024-10-07 PROCEDURE — 93798 PHYS/QHP OP CAR RHAB W/ECG: CPT | Performed by: INTERNAL MEDICINE

## 2024-10-07 NOTE — PROGRESS NOTES
Subjective   Patient ID: Edward D Hume is a 74 y.o. male. They present today with a chief complaint of scrap on leg (Needs a tetanus shot ).    History of Present Illness  73 yo male coming in for an abrasion to the left calf. He states he scraped the leg on a robel dehumidifier this am. He states he checked his chart and has not had a tetanus shot since 2014.     Past Medical History  Allergies as of 10/07/2024 - Reviewed 10/07/2024   Allergen Reaction Noted    Amoxicillin Shortness of breath 12/04/2017    Metformin Swelling and Angioedema 06/15/2023    Iodinated contrast media Palpitations 06/15/2023       (Not in a hospital admission)       Past Medical History:   Diagnosis Date    Acute nonparalytic poliomyelitis ([facility])     Acute nonparalytic polio    Awareness under anesthesia     Body mass index (BMI) 25.0-25.9, adult 10/26/2019    Body mass index (BMI) of 25.0 to 25.9 in adult    Body mass index (BMI) 27.0-27.9, adult 09/22/2021    Body mass index (BMI) of 27.0 to 27.9 in adult    Body mass index (BMI) 29.0-29.9, adult 08/31/2019    Body mass index (BMI) of 29.0 to 29.9 in adult    CKD (chronic kidney disease)     Crushing injury of unspecified finger(s), initial encounter 06/12/2014    Injury, crush, finger    Diabetes mellitus (Multi)     Disorder of the skin and subcutaneous tissue, unspecified 09/04/2019    Skin lesion    Disorder of the skin and subcutaneous tissue, unspecified 03/30/2016    Skin lesion    Elevated prostate specific antigen (PSA)     Elevated prostate specific antigen (PSA)    GERD (gastroesophageal reflux disease)     Gout     History of falling 06/27/2016    History of fall    HLD (hyperlipidemia)     HTN (hypertension)     Other chest pain 03/07/2018    Chest pressure    Other conditions influencing health status 02/22/2017    Type 2 diabetes mellitus, uncontrolled    Other conditions influencing health status 03/04/2016    Epicondylitis    Other conditions influencing health  status     Nephrolithiasis    Personal history of other mental and behavioral disorders 11/06/2018    History of depression    Personal history of other specified conditions 04/16/2019    History of dizziness    Personal history of other specified conditions 04/16/2019    History of weight loss    Personal history of other specified conditions 06/29/2016    History of abdominal pain    Puncture wound without foreign body of left hand, initial encounter 03/05/2019    Puncture wound of hand, left    Puncture wound without foreign body of unspecified hand, initial encounter 03/05/2019    Puncture wound, hand    Superficial mycosis, unspecified 12/01/2014    Fungal otitis externa    Trigger finger, left middle finger 07/24/2017    Trigger middle finger of left hand    Trigger finger, left middle finger 07/24/2017    Trigger middle finger of left hand    Trigger finger, left ring finger 07/24/2017    Trigger ring finger of left hand    Trigger finger, right index finger 07/24/2017    Trigger index finger of right hand    Trigger finger, right middle finger 11/10/2017    Trigger middle finger of right hand       Past Surgical History:   Procedure Laterality Date    CARDIAC CATHETERIZATION      CARDIAC CATHETERIZATION N/A 05/01/2024    Procedure: Left Heart Cath with Coronary Angiography and LV;  Surgeon: Jerrod Franco MD;  Location: [facility];  Service: Cardiovascular;  Laterality: N/A;  LEFT HEART CATH WITH POSSIBLE PCI WED MAY 1, 2024 AT 11:00 AM PT WILL ARRIVE AT 9:30 AM @ [facility] DR. FRANCO    CORONARY ARTERY BYPASS GRAFT      HAND TENDON SURGERY  02/15/2013    Hand Incision Tendon Sheath Of A Finger    KNEE ARTHROSCOPY W/ DEBRIDEMENT      OTHER SURGICAL HISTORY  04/09/2013    Needle Biopsy Of Prostate    OTHER SURGICAL HISTORY  07/02/2020    Retinal detachment repair    OTHER SURGICAL HISTORY  12/06/2017    Upper Gastrointestinal Endoscopy, Simple Primary Exam    OTHER SURGICAL HISTORY   06/12/2013    Repair Of Retinal Detachment Procedure Detail    OTHER SURGICAL HISTORY  06/12/2013    Surg Rad Resect Tonsil/Tonsil Pillars/Retromol Tri W/O Closure    SHOULDER SURGERY  06/12/2013    Shoulder Surgery        reports that he has quit smoking. His smoking use included cigarettes. He has never used smokeless tobacco. He reports that he does not currently use alcohol. He reports that he does not use drugs.    Review of Systems  Review of Systems:  General: No weight loss, fatigue, anorexia, insomnia, fever, chills.  Cardiac: No chest pain, palpitations, syncope, near syncope.  Pulmonary:  No shortness of breath, cough, hemoptysis  Heme/lymph: No swollen glands, fever, bleeding  : No discharge, dysuria, frequency, urgency, hematuria  Musculoskeletal: No limb pain, joint pain, joint swelling.  Skin: No rashes. Abrasion to left lower calf.  Neuro: No numbness, tingling, headaches                                 Objective    Vitals:    10/07/24 1154   BP: 145/59   BP Location: Left arm   Patient Position: Sitting   BP Cuff Size: Adult   Pulse: 97   Resp: 16   Temp: 36.6 °C (97.9 °F)   TempSrc: Oral   SpO2: 97%   Weight: 90.7 kg (200 lb)   Height: 1.829 m (6')     No LMP for male patient.    Physical Exam  Physical Exam:  General: Vital noted, no distress. Afebrile  Skin: Small abrasion noted to the left lateral calf. No bleeding is noted. No signs of infection  I reviewed the patients immunizations and last TDaP was in 2019. Patient does not need updated today.     Procedures    Point of Care Test & Imaging Results from this visit  No results found for this visit on 10/07/24.   No results found.    Diagnostic study results (if any) were reviewed by GALINA Mae.    Assessment/Plan   Allergies, medications, history, and pertinent labs/EKGs/Imaging reviewed by GALINA Mae.     Medical Decision Making  Treatment:  Differential: 1) , 2) , 3)   Plan: Discussed differential with the  pateint. Patient will follow up with the PCP in the next 2-3 days. Return for any worsening symptoms or go to the ER for further evaluation. Patient understands return precautions and discharege insturctions.  Impression:   1) Abrasion of left lower leg      Orders and Diagnoses  Diagnoses and all orders for this visit:  Abrasion      Medical Admin Record      Patient disposition: Home    Electronically signed by GALINA Mae  12:23 PM

## 2024-10-09 ENCOUNTER — CLINICAL SUPPORT (OUTPATIENT)
Dept: CARDIAC REHAB | Facility: CLINIC | Age: 74
End: 2024-10-09
Payer: MEDICARE

## 2024-10-09 DIAGNOSIS — Z95.1 S/P CABG X 1: ICD-10-CM

## 2024-10-09 PROCEDURE — 93798 PHYS/QHP OP CAR RHAB W/ECG: CPT | Performed by: INTERNAL MEDICINE

## 2024-10-09 NOTE — PROGRESS NOTES
Cardiac Rehab Education  Kraigward D Hume   [id number]    10/9/2024  Discussion of prescribed drug names, doses, side effects, and medication uses was done with patient. Education handout, Medications Used for Cardiac Conditions was also given to the patient. Patient was instructed to come back with any questions.      Signature She Stout RN

## 2024-10-11 ENCOUNTER — CLINICAL SUPPORT (OUTPATIENT)
Dept: CARDIAC REHAB | Facility: CLINIC | Age: 74
End: 2024-10-11
Payer: MEDICARE

## 2024-10-11 DIAGNOSIS — Z95.1 S/P CABG X 1: ICD-10-CM

## 2024-10-14 ENCOUNTER — APPOINTMENT (OUTPATIENT)
Dept: CARDIAC REHAB | Facility: CLINIC | Age: 74
End: 2024-10-14
Payer: MEDICARE

## 2024-10-14 ENCOUNTER — DOCUMENTATION (OUTPATIENT)
Dept: CARDIAC REHAB | Facility: CLINIC | Age: 74
End: 2024-10-14
Payer: MEDICARE

## 2024-10-14 NOTE — PROGRESS NOTES
Cardiac Rehabilitation 60 Day Reassessment    Name: Edward D Hume  Medical Record Number: [id number]  YOB: 1950  Age: 74 y.o.    Today’s Date: 10/14/2024  Primary Care Physician: Roselia Ibrahim MD  Referring Physician: Jerrod Anthony MD  Program Location: [address]  Primary Diagnosis: CABG     Onset/Date of Diagnosis: 5/2/2024    Session #: 18     AACVPR Risk Stratification: Moderate    Falls Risk: Medium  Psychosocial Assessment   Initial PHQ-9:  4  Sent PH-Q 9 to MD if score > 20: No; score < 20    Pt reported/currently experiencing stress: No  Patient uses stress management skills: No   History of: no history of anxiety or depression  Currently seeing a mental health provider: Yes, Then provider name: in progress  Social Support: Yes, Whom:in progress  Quality of Life Survey: SF-36   SF-36 Pre Post   Physical Component Score TBD TBD   Mental Component Score TBD TBD     Learning Assessment:  Learning assessment/barriers: None  Preferred learning method: Auditory and Visual  Barriers: None    Stages of Change:  Action    Psychosocial Plan    Goal Status: In Progress    Psychosocial Goals: Demonstrating proper techniques for stress management and Maintain or lower PH-Q 9 score by discharge    Psychosocial Interventions/Education:   8/27/24 Initial PHQ-9 score reviewed w/ pt at eval/SD  10/14/2024 pt states no change in psychosocial status/MS      Nutrition Assessment:    Hyperlipidemia: Yes     Lipids:   Lab Results   Component Value Date    CHOL 120 09/24/2024    HDL 29.3 09/24/2024    LDLF 61 09/25/2023    TRIG 258 (H) 02/23/2024       Current Dietary Guidelines: Low fat, Low sodium  Barriers to dietary change: no    Diet Habit Survey: Picture Your Plate  Pre: 61  Post: To be done at discharge.    Diabetes Assessment    Lab Results   Component Value Date    HGBA1C 6.3 (H) 09/24/2024       History of Diabetes:  Pt monitors BS at home: Yes   Frequency: 0 /day  FBS range:  "unknown -   Hypoglycemic Episodes:  unknown    Weight Management  Height: 180.3 cm (5' 10.98\")  Weight: 90.3 kg (199 lb)  BMI (Calculated): 27.77    Nutrition Plan    Goal Status: In Progress    Nutrition Goals: Lipid Goal: HDL>45, LDL <70, Total <180, Trigs <150, Improve Diet Habit Survey score by 5-10 points by discharge, Adapt a low-sodium, DASH diet prior to discharge, and Adapt a Mediterranean focused diet prior to discharge    Nutrition Interventions/Education:   9/4/2024  Gave and reviewed PYP with patient. PYP score 61. Pt has made changes to his diet since completing the PYP. He has decreased his intake of processed meats in the morning and now eats oatmeal with banana. Reviewed with him adding protein/healthy fats to carbohydrates to help better manage his blood sugars. Reviewed protein/healthy fat ideas to add. Gave Heart Healthy Tips handout to review at home and follow up with questions as needed. /Signature Nora Acosta RDN, LD  0/2/2024  Label reading education was done with program's RDN during today's visit. Label Reading Highlights handouts was given for review of label contents/PAPO        Exercise Assessment    Home Exercise:  No  Mode: NA  Frequency: NA  Duration: NA    Exercise Prescription     Exercise Prescription based on: Duke Activity Status Index (DASI)    DASI Score: 31.45  MET Score: 6.6   Frequency:  3 days/week   Mode: Treadmill, NuStep, and Recumbent Cycle   Duration: 36 total aerobic minutes   Intensity: RPE 12-16  Target HR:    MET Level: 3.6  Patient wears supplemental O2: No     Modality Workload METs Duration (minutes)   1 Pre-Exercise   2:00   2 Treadmill 2.5 mph @ 2% 3.6 12:00   3 NuStep 6@78 2.9 12:00   4 Recumbent Bike 5@55  3.1 12:00   5 Cooldown    5 :00   6 Post-Exercise   2:00     Resistance Training: No   Home Exercise Prescription given: yes    Exercise Plan    Goal Status: In progress    Exercise Goals: Increase exercise MET level by 5-10% each week, Increase " total exercise duration to 30-45 minutes, and Obtain 150 minutes/week of moderate intensity aerobic exercise    Exercise Interventions/Education:   9/11/24 adjusted THR/MS  9/13/24 gave pt home ex rx & log, encouraged pt to exercise on non-rehab days/MS  9/18/2024  Exercise prescription and maintenance education was done during today's session. Discussed benefits of exercise, FITT principle, reviewed perceived exertion scale, and patient's THR was given for home practice with instructions on how to obtain. Handouts were given for personal reference. Home exercise prescription is to be given prior to discharge from program/RP        Other Core Components/Risk Factor Assessment:    Medication adherence  Current Medications:   Medication Documentation Review Audit       Reviewed by GALINA Mae (Nurse Practitioner) on 10/07/24 at 1211      Medication Order Taking? Sig Documenting Provider Last Dose Status   albuterol 90 mcg/actuation inhaler [id number] No Inhale 2 puffs every 6 hours if needed for shortness of breath. Pj Ivy DO Taking Active   allopurinol (Zyloprim) 300 mg tablet [id number] No Take 1 tablet (300 mg) by mouth once daily. Pj Ivy DO Taking Active   amLODIPine (Norvasc) 10 mg tablet [id number] No Take 1 tablet (10 mg) by mouth once daily. Pj Ivy DO Taking Active   aspirin 81 mg EC tablet [id number] No Take 1 tablet (81 mg) by mouth once daily. Pj Ivy DO Taking Active   atorvastatin (Lipitor) 80 mg tablet [id number] No Take 1 tablet (80 mg) by mouth once daily at bedtime. GALINA Hua Taking Active   biotin 10 mg tablet [id number] No Take 1 tablet (10 mg) by mouth once daily. Historical Provider, MD Taking Active   cranberry fruit extract (cranberry extract) 300 mg tablet [id number] No Take 650 mg by mouth once daily. Historical Provider, MD Taking Active   famotidine (Pepcid) 20 mg tablet [id number] No Take 1 tablet (20 mg) by mouth once daily at bedtime.  "Pj Ivy, DO Taking Active   gemfibrozil (Lopid) 600 mg tablet [id number] No Take 1 tablet (600 mg) by mouth 2 times a day. Pj Ivy, DO Taking Active   glipiZIDE XL (Glucotrol XL) 10 mg 24 hr tablet [id number] No Take 2 tablets (20 mg) by mouth once daily. Pj Ivy, DO Taking Active   insulin glargine (Toujeo SoloStar U-300 Insulin) 300 unit/mL (1.5 mL) injection [id number] No Inject 36 Units under the skin once daily at bedtime. Take as directed per insulin instructions. Pj Ivy, DO Taking Active   Januvia 100 mg tablet [id number] No Take 1 tablet (100 mg) by mouth once daily. Pj Ivy, DO Taking Active   lidocaine 4 % cream [id number] No Apply topically 4 times a day as needed for mild pain (1 - 3). Pj Ivy, DO Taking Active   metoprolol tartrate (Lopressor) 25 mg tablet [id number] No Take 1 tablet (25 mg) by mouth 2 times a day. Pj Ivy, DO Taking Active   Discontinued 10/03/24 1305   omeprazole (PriLOSEC) 40 mg DR capsule [id number]  Take 1 capsule (40 mg) by mouth once daily in the morning. Take before meals. Roselia Ibrahim MD  Active   pen needle, diabetic (BD Ultra-Fine Short Pen Needle) 31 gauge x 5/16\" needle [id number] No USE AS DIRECTED Pj Ivy, DO Taking Active   tamsulosin (Flomax) 0.4 mg 24 hr capsule [id number] No Take 1 capsule (0.4 mg) by mouth once daily. Pj Ivy, DO Taking Active   valsartan-hydrochlorothiazide (Diovan-HCT) 160-12.5 mg tablet [id number] No Take 1 tablet by mouth once daily. Pj Ivy, DO Taking Active                                 Medication compliance: Yes   Uses pill box/organizer: No    Carries medication list: Yes     Blood Pressure Management  History of Hypertension: Yes   Medication Changes: No   Resting BP:  WNL    Heart Failure Management  Hx of Heart Failure: No    Smoking/Tobacco Assessment  Social History     Tobacco Use   Smoking Status Former    Types: Cigarettes   Smokeless Tobacco Never "       Other Core Component Plan    Goal Status: In progress    Other Core Component Goals: Medication compliance, Verbalize medication usage and drug actions by discharge, and Achieve resting BP of < 130/80 by discharge    Other Core Component Interventions/Education:   9/11/24 gave pt CR book, reviewed lipid profile/MS  9/25/24  Coronary artery disease education was done during today's session. Signs and symptoms of possible heart attack, risk factors for prevention, and lifestyle behavior modifications were all explained. Handout for personal use was given/RP.   10/9/2024  Discussion of prescribed drug names, doses, side effects, and medication uses was done with patient. Education handout, Medications Used for Cardiac Conditions was also given to the patient. Patient was instructed to come back with any questions/SD      Individual Patient Goals:    Increase strength and endurance by discharge  Establish a regular exercise routine 3-5 days a week by discharge     Goal Status: In progress    Staff Comments:  Mr. Hume remains adherent to his exercise sessions.  He continues to progress on his outcomes and goals.  No cardiac complaints voiced.      Rehab Staff Signature: Dana Gracia, MS, CCRP

## 2024-10-16 ENCOUNTER — CLINICAL SUPPORT (OUTPATIENT)
Dept: CARDIAC REHAB | Facility: CLINIC | Age: 74
End: 2024-10-16
Payer: MEDICARE

## 2024-10-16 DIAGNOSIS — Z95.1 S/P CABG X 1: ICD-10-CM

## 2024-10-18 ENCOUNTER — CLINICAL SUPPORT (OUTPATIENT)
Dept: CARDIAC REHAB | Facility: CLINIC | Age: 74
End: 2024-10-18
Payer: MEDICARE

## 2024-10-18 DIAGNOSIS — Z95.1 S/P CABG X 1: ICD-10-CM

## 2024-10-21 ENCOUNTER — CLINICAL SUPPORT (OUTPATIENT)
Dept: CARDIAC REHAB | Facility: CLINIC | Age: 74
End: 2024-10-21
Payer: MEDICARE

## 2024-10-21 DIAGNOSIS — Z95.1 S/P CABG X 1: ICD-10-CM

## 2024-10-21 PROCEDURE — 93798 PHYS/QHP OP CAR RHAB W/ECG: CPT | Performed by: INTERNAL MEDICINE

## 2024-10-23 ENCOUNTER — CLINICAL SUPPORT (OUTPATIENT)
Dept: CARDIAC REHAB | Facility: CLINIC | Age: 74
End: 2024-10-23
Payer: MEDICARE

## 2024-10-23 DIAGNOSIS — Z95.1 S/P CABG X 1: ICD-10-CM

## 2024-10-23 PROCEDURE — 93798 PHYS/QHP OP CAR RHAB W/ECG: CPT | Performed by: INTERNAL MEDICINE

## 2024-10-23 NOTE — PROGRESS NOTES
Cardiac Rehab Education  Edward D Hume   [id number]    10/23/2024  Risk factors of cardiovascular disease were discussed today's. Education on modifiable and non-modifiable risk factors of CVD and recommendations to manage them was done today. Handouts were provided along with further reference. Patient was educated to comeback with further questions.     Signature Miriam Gant EP

## 2024-10-25 ENCOUNTER — CLINICAL SUPPORT (OUTPATIENT)
Dept: CARDIAC REHAB | Facility: CLINIC | Age: 74
End: 2024-10-25
Payer: MEDICARE

## 2024-10-25 DIAGNOSIS — Z95.1 S/P CABG X 1: ICD-10-CM

## 2024-10-25 PROCEDURE — 93798 PHYS/QHP OP CAR RHAB W/ECG: CPT | Performed by: INTERNAL MEDICINE

## 2024-10-25 NOTE — PROGRESS NOTES
Cardiac Exercise Prescription Update    10/25/2024 Reviewed ExRx with patient. Based on HR/BP/ RPD/ RPE, intensity/workload was increased on Treadmill, NuStep 4000, and Recumbent Bike. Patient rating RPE/RPD appropriately.      Modality Workload METs Duration (minutes)   1 Pre-Exercise      2 Treadmill 2.7 mph @ 2.5 % 2.8 13 :00   3 NuStep 4000 8 load @ 90 chaney 3.2 13 :00   4 Recumbent Bike 7 load @ 55 rpms 3.6 13 :00   5 Post-Exercise           Signature: VANESA Romano

## 2024-10-28 ENCOUNTER — CLINICAL SUPPORT (OUTPATIENT)
Dept: CARDIAC REHAB | Facility: CLINIC | Age: 74
End: 2024-10-28
Payer: MEDICARE

## 2024-10-28 DIAGNOSIS — Z95.1 S/P CABG X 1: ICD-10-CM

## 2024-10-28 PROCEDURE — 93798 PHYS/QHP OP CAR RHAB W/ECG: CPT | Performed by: INTERNAL MEDICINE

## 2024-10-30 ENCOUNTER — CLINICAL SUPPORT (OUTPATIENT)
Dept: CARDIAC REHAB | Facility: CLINIC | Age: 74
End: 2024-10-30
Payer: MEDICARE

## 2024-10-30 DIAGNOSIS — Z95.1 S/P CABG X 1: ICD-10-CM

## 2024-10-30 PROCEDURE — 93798 PHYS/QHP OP CAR RHAB W/ECG: CPT | Performed by: INTERNAL MEDICINE

## 2024-11-01 ENCOUNTER — CLINICAL SUPPORT (OUTPATIENT)
Dept: CARDIAC REHAB | Facility: CLINIC | Age: 74
End: 2024-11-01
Payer: MEDICARE

## 2024-11-01 DIAGNOSIS — Z95.1 S/P CABG X 1: ICD-10-CM

## 2024-11-01 PROCEDURE — 93798 PHYS/QHP OP CAR RHAB W/ECG: CPT | Performed by: INTERNAL MEDICINE

## 2024-11-04 ENCOUNTER — CLINICAL SUPPORT (OUTPATIENT)
Dept: CARDIAC REHAB | Facility: CLINIC | Age: 74
End: 2024-11-04
Payer: MEDICARE

## 2024-11-04 DIAGNOSIS — Z95.1 S/P CABG X 1: ICD-10-CM

## 2024-11-04 PROCEDURE — 93798 PHYS/QHP OP CAR RHAB W/ECG: CPT | Performed by: INTERNAL MEDICINE

## 2024-11-06 ENCOUNTER — CLINICAL SUPPORT (OUTPATIENT)
Dept: CARDIAC REHAB | Facility: CLINIC | Age: 74
End: 2024-11-06
Payer: MEDICARE

## 2024-11-06 DIAGNOSIS — Z95.1 S/P CABG X 1: ICD-10-CM

## 2024-11-06 PROCEDURE — 93798 PHYS/QHP OP CAR RHAB W/ECG: CPT | Performed by: INTERNAL MEDICINE

## 2024-11-06 NOTE — PROGRESS NOTES
Ferdinand JULIAN Hume  1950  [id number]  74 y.o.    [facility] [id number] [facility]      Cardiac/Pulmonary Rehab Nutrition Education    11/6/2024  Patient attended Heart Healthy Diet lecture with program RDN during today's exercise session. Heart Healthy diet tips sheet was given for further review at home and patient was encouraged to come back with any follow up questions.    Signature Nora Acosta RDN, LD

## 2024-11-08 ENCOUNTER — DOCUMENTATION (OUTPATIENT)
Dept: CARDIAC REHAB | Facility: CLINIC | Age: 74
End: 2024-11-08

## 2024-11-08 ENCOUNTER — CLINICAL SUPPORT (OUTPATIENT)
Dept: CARDIAC REHAB | Facility: CLINIC | Age: 74
End: 2024-11-08
Payer: MEDICARE

## 2024-11-08 DIAGNOSIS — Z95.1 S/P CABG X 1: ICD-10-CM

## 2024-11-08 PROCEDURE — 93798 PHYS/QHP OP CAR RHAB W/ECG: CPT | Performed by: INTERNAL MEDICINE

## 2024-11-08 NOTE — PROGRESS NOTES
Cardiac Rehabilitation 90 Day Reassessment    Name: Edward D Hume  Medical Record Number: [id number]  YOB: 1950  Age: 74 y.o.    Today’s Date: 11/8/2024  Primary Care Physician: Roselia Ibrahim MD  Referring Physician: Jerrod Anthony MD  Program Location: [address]  Primary Diagnosis: CABG     Onset/Date of Diagnosis: 5/2/2024    Session #: 26     AACVPR Risk Stratification: Moderate    Falls Risk: Medium  Psychosocial Assessment   Initial PHQ-9:  4  Sent PH-Q 9 to MD if score > 20: No; score < 20  Post PHQ-9:    Pt reported/currently experiencing stress: No  Patient uses stress management skills: No   History of: no history of anxiety or depression  Currently seeing a mental health provider: Yes, Then provider name: in progress  Social Support: Yes, Whom:in progress  Quality of Life Survey: SF-36   SF-36 Pre Post   Physical Component Score TBD TBD   Mental Component Score TBD TBD     Learning Assessment:  Learning assessment/barriers: None  Preferred learning method: Auditory and Visual  Barriers: None    Stages of Change:  Action    Psychosocial Plan    Goal Status: In Progress    Psychosocial Goals: Demonstrating proper techniques for stress management and Maintain or lower PH-Q 9 score by discharge    Psychosocial Interventions/Education:   8/27/24 Initial PHQ-9 score reviewed w/ pt at [facility]/SD  10/14/2024 pt states no change in psychosocial status/MS      Nutrition Assessment:    Hyperlipidemia: Yes     Lipids:   Lab Results   Component Value Date    CHOL 120 09/24/2024    HDL 29.3 09/24/2024    LDLF 61 09/25/2023    TRIG 258 (H) 02/23/2024       Current Dietary Guidelines: Low fat, Low sodium  Barriers to dietary change: no    Diet Habit Survey: Picture Your Plate  Pre: 61  Post: To be done at discharge.    Diabetes Assessment    Lab Results   Component Value Date    HGBA1C 6.3 (H) 09/24/2024       History of Diabetes:  Pt monitors BS at home: Yes   Frequency: 0  "/day  FBS range: unknown -   Hypoglycemic Episodes:  unknown    Weight Management  Height: 180.3 cm (5' 10.98\")  Weight: 90.3 kg (199 lb)  BMI (Calculated): 27.77    Nutrition Plan    Goal Status: In Progress    Nutrition Goals: Lipid Goal: HDL>45, LDL <70, Total <180, Trigs <150, Improve Diet Habit Survey score by 5-10 points by discharge, Adapt a low-sodium, DASH diet prior to discharge, and Adapt a Mediterranean focused diet prior to discharge    Nutrition Interventions/Education:   9/4/2024  Gave and reviewed PYP with patient. PYP score 61. Pt has made changes to his diet since completing the PYP. He has decreased his intake of processed meats in the morning and now eats oatmeal with banana. Reviewed with him adding protein/healthy fats to carbohydrates to help better manage his blood sugars. Reviewed protein/healthy fat ideas to add. Gave Heart Healthy Tips handout to review at home and follow up with questions as needed. /Signature Nora Acosta RDN, REYNALDO  10/2/2024  Label reading education was done with program's RDN during today's visit. Label Reading Highlights handouts was given for review of label contents/PAPO  11/6/2024  Patient attended Heart Healthy Diet lecture with program RDN during today's exercise session. Heart Healthy diet tips sheet was given for further review at home and patient was encouraged to come back with any follow up questions./Signature Nora Acosta RDN, LD        Exercise Assessment    Home Exercise:  No  Mode: NA  Frequency: NA  Duration: NA    Exercise Prescription     Exercise Prescription based on: Duke Activity Status Index (DASI)    DASI Score: 31.45  MET Score: 6.6   Frequency:  3 days/week   Mode: Treadmill, NuStep, and Recumbent Cycle   Duration: 39 total aerobic minutes   Intensity: RPE 12-16  Target HR:    MET Level: 3.8  Patient wears supplemental O2: No     Modality Workload METs Duration (minutes)   1 Pre-Exercise   2:00   2 Treadmill 2.7 mph @ 2% 3.8 13:00 "   3 NuStep 8@90 3.2 13:00   4 Recumbent Bike 7@55  3.6 13:00   5 Cooldown    5 :00   6 Post-Exercise   2:00     Resistance Training: No   Home Exercise Prescription given: yes    Exercise Plan    Goal Status: In progress    Exercise Goals: Increase exercise MET level by 5-10% each week, Increase total exercise duration to 30-45 minutes, and Obtain 150 minutes/week of moderate intensity aerobic exercise    Exercise Interventions/Education:   9/11/24 adjusted THR/MS  9/13/24 gave pt home ex rx & log, encouraged pt to exercise on non-rehab days/MS  9/18/2024  Exercise prescription and maintenance education was done during today's session. Discussed benefits of exercise, FITT principle, reviewed perceived exertion scale, and patient's THR was given for home practice with instructions on how to obtain. Handouts were given for personal reference. Home exercise prescription is to be given prior to discharge from program/RP  10/30/2024   Stretching education was done with patient during today's rehab session. Demonstrations of standing stretches were done with patient, in addition to discussing the benefits of flexibility training. Patient was provided handout with examples of exercises and further instructions to be practiced at home. Resistance training guidelines were also discussed. Patient encouraged to report to rehab staff with any questions or concerns.  /Signature Tereso Edmonds RN    Other Core Components/Risk Factor Assessment:    Medication adherence  Current Medications:   Medication Documentation Review Audit       Reviewed by GALINA Mae (Nurse Practitioner) on 10/07/24 at 1211      Medication Order Taking? Sig Documenting Provider Last Dose Status   albuterol 90 mcg/actuation inhaler [id number] No Inhale 2 puffs every 6 hours if needed for shortness of breath. Pj Ivy, DO Taking Active   allopurinol (Zyloprim) 300 mg tablet [id number] No Take 1 tablet (300 mg) by mouth once daily.  Pj Ivy, DO Taking Active   amLODIPine (Norvasc) 10 mg tablet [id number] No Take 1 tablet (10 mg) by mouth once daily. Pj Ivy, DO Taking Active   aspirin 81 mg EC tablet [id number] No Take 1 tablet (81 mg) by mouth once daily. Pj Ivy, DO Taking Active   atorvastatin (Lipitor) 80 mg tablet [id number] No Take 1 tablet (80 mg) by mouth once daily at bedtime. Theresa Carpenter APRN-CNP Taking Active   biotin 10 mg tablet [id number] No Take 1 tablet (10 mg) by mouth once daily. Historical Provider, MD Taking Active   cranberry fruit extract (cranberry extract) 300 mg tablet [id number] No Take 650 mg by mouth once daily. Historical Provider, MD Taking Active   famotidine (Pepcid) 20 mg tablet [id number] No Take 1 tablet (20 mg) by mouth once daily at bedtime. Pj Ivy, DO Taking Active   gemfibrozil (Lopid) 600 mg tablet [id number] No Take 1 tablet (600 mg) by mouth 2 times a day. Pj Ivy, DO Taking Active   glipiZIDE XL (Glucotrol XL) 10 mg 24 hr tablet [id number] No Take 2 tablets (20 mg) by mouth once daily. Pj Ivy, DO Taking Active   insulin glargine (Toujeo SoloStar U-300 Insulin) 300 unit/mL (1.5 mL) injection [id number] No Inject 36 Units under the skin once daily at bedtime. Take as directed per insulin instructions. Pj Ivy DO Taking Active   Januvia 100 mg tablet [id number] No Take 1 tablet (100 mg) by mouth once daily. Pj Ivy, DO Taking Active   lidocaine 4 % cream [id number] No Apply topically 4 times a day as needed for mild pain (1 - 3). Pj Ivy, DO Taking Active   metoprolol tartrate (Lopressor) 25 mg tablet [id number] No Take 1 tablet (25 mg) by mouth 2 times a day. Pj Ivy, DO Taking Active   Discontinued 10/03/24 1305   omeprazole (PriLOSEC) 40 mg DR capsule [id number]  Take 1 capsule (40 mg) by mouth once daily in the morning. Take before meals. Roselia Ibrahim MD  Active   pen needle, diabetic (BD Ultra-Fine Short Pen Needle) 31  "gauge x 5/16\" needle [id number] No USE AS DIRECTED Pj Ivy, DO Taking Active   tamsulosin (Flomax) 0.4 mg 24 hr capsule [id number] No Take 1 capsule (0.4 mg) by mouth once daily. Pj Ivy, DO Taking Active   valsartan-hydrochlorothiazide (Diovan-HCT) 160-12.5 mg tablet [id number] No Take 1 tablet by mouth once daily. Pj Ivy, DO Taking Active                                 Medication compliance: Yes   Uses pill box/organizer: No    Carries medication list: Yes     Blood Pressure Management  History of Hypertension: Yes   Medication Changes: No   Resting BP:  WNL    Heart Failure Management  Hx of Heart Failure: No    Smoking/Tobacco Assessment  Social History     Tobacco Use   Smoking Status Former    Types: Cigarettes   Smokeless Tobacco Never       Other Core Component Plan    Goal Status: In progress    Other Core Component Goals: Medication compliance, Verbalize medication usage and drug actions by discharge, and Achieve resting BP of < 130/80 by discharge    Other Core Component Interventions/Education:   9/11/24 gave pt CR book, reviewed lipid profile/MS  9/25/24  Coronary artery disease education was done during today's session. Signs and symptoms of possible heart attack, risk factors for prevention, and lifestyle behavior modifications were all explained. Handout for personal use was given/RP.   10/9/2024  Discussion of prescribed drug names, doses, side effects, and medication uses was done with patient. Education handout, Medications Used for Cardiac Conditions was also given to the patient. Patient was instructed to come back with any questions/SD  10/23/2024  Risk factors of cardiovascular disease were discussed today's. Education on modifiable and non-modifiable risk factors of CVD and recommendations to manage them was done today. Handouts were provided along with further reference. Patient was educated to comeback with further questions/RP      Individual Patient " Goals:    Increase strength and endurance by discharge  Establish a regular exercise routine 3-5 days a week by discharge     Goal Status: In progress    Staff Comments:  Mr. Hume remains adherent to his exercise sessions.  He continues to progress on his outcomes and goals.  No cardiac complaints voiced.      Rehab Staff Signature: Dana Gracia MS, CCRP

## 2024-11-11 ENCOUNTER — CLINICAL SUPPORT (OUTPATIENT)
Dept: CARDIAC REHAB | Facility: CLINIC | Age: 74
End: 2024-11-11
Payer: MEDICARE

## 2024-11-11 DIAGNOSIS — Z95.1 S/P CABG X 1: ICD-10-CM

## 2024-11-11 PROCEDURE — 93798 PHYS/QHP OP CAR RHAB W/ECG: CPT | Mod: KX | Performed by: INTERNAL MEDICINE

## 2024-11-13 ENCOUNTER — CLINICAL SUPPORT (OUTPATIENT)
Dept: CARDIAC REHAB | Facility: CLINIC | Age: 74
End: 2024-11-13
Payer: MEDICARE

## 2024-11-13 DIAGNOSIS — Z95.1 S/P CABG X 1: ICD-10-CM

## 2024-11-13 PROCEDURE — 93798 PHYS/QHP OP CAR RHAB W/ECG: CPT | Performed by: INTERNAL MEDICINE

## 2024-11-13 NOTE — PROGRESS NOTES
Cardiac Rehab Education  Edward D Hume   [id number]    11/13/2024  Cardiovascular changes with exercise were discussed in today's session. Both immediate and long term effects of exercise were covered and the importance of cooling down post workout was reviewed.     Marli Edmonds RN

## 2024-11-13 NOTE — PROGRESS NOTES
Cardiac Exercise Prescription Update    11/13/2024 Reviewed ExRx with patient. Based on HR/BP/ RPD/ RPE, time was increased by 15 minute(s) on each modality for a total of 45 minutes each exercise session intensity/workload was increased on Treadmill, NuStep 4000, and Recumbent Bike. Patient rating RPE/RPD appropriately.      Modality Workload METs Duration (minutes)   1 Pre-Exercise      2 Treadmill 2.9 mph @ 2.5 % 4.3 15 :00   3 NuStep 4000 10 load @ 112 chaney 3.6 15 :00   4 Recumbent Bike 8 load @ 60 rpms 4.8 15 :00   5 Post-Exercise           Signature: VANESA Romano

## 2024-11-15 ENCOUNTER — CLINICAL SUPPORT (OUTPATIENT)
Dept: CARDIAC REHAB | Facility: CLINIC | Age: 74
End: 2024-11-15
Payer: MEDICARE

## 2024-11-15 DIAGNOSIS — Z95.1 S/P CABG X 1: ICD-10-CM

## 2024-11-15 PROCEDURE — 93798 PHYS/QHP OP CAR RHAB W/ECG: CPT | Performed by: INTERNAL MEDICINE

## 2024-11-18 ENCOUNTER — CLINICAL SUPPORT (OUTPATIENT)
Dept: CARDIAC REHAB | Facility: CLINIC | Age: 74
End: 2024-11-18
Payer: MEDICARE

## 2024-11-18 DIAGNOSIS — Z95.1 S/P CABG X 1: ICD-10-CM

## 2024-11-20 ENCOUNTER — APPOINTMENT (OUTPATIENT)
Dept: CARDIAC REHAB | Facility: CLINIC | Age: 74
End: 2024-11-20
Payer: MEDICARE

## 2024-11-20 DIAGNOSIS — Z95.1 S/P CABG X 1: ICD-10-CM

## 2024-11-25 ENCOUNTER — CLINICAL SUPPORT (OUTPATIENT)
Dept: CARDIAC REHAB | Facility: CLINIC | Age: 74
End: 2024-11-25
Payer: MEDICARE

## 2024-11-25 ENCOUNTER — TELEPHONE (OUTPATIENT)
Dept: PRIMARY CARE | Facility: CLINIC | Age: 74
End: 2024-11-25

## 2024-11-25 DIAGNOSIS — Z95.1 S/P CABG X 1: ICD-10-CM

## 2024-11-25 PROCEDURE — 93798 PHYS/QHP OP CAR RHAB W/ECG: CPT | Performed by: INTERNAL MEDICINE

## 2024-11-27 ENCOUNTER — CLINICAL SUPPORT (OUTPATIENT)
Dept: CARDIAC REHAB | Facility: CLINIC | Age: 74
End: 2024-11-27
Payer: MEDICARE

## 2024-11-27 DIAGNOSIS — Z95.1 S/P CABG X 1: ICD-10-CM

## 2024-11-27 PROCEDURE — 93798 PHYS/QHP OP CAR RHAB W/ECG: CPT | Performed by: INTERNAL MEDICINE

## 2024-11-29 DIAGNOSIS — Z95.1 POSTSURGICAL AORTOCORONARY BYPASS STATUS: Primary | ICD-10-CM

## 2024-12-02 ENCOUNTER — CLINICAL SUPPORT (OUTPATIENT)
Dept: CARDIAC REHAB | Facility: CLINIC | Age: 74
End: 2024-12-02
Payer: MEDICARE

## 2024-12-02 DIAGNOSIS — Z95.1 POSTSURGICAL AORTOCORONARY BYPASS STATUS: ICD-10-CM

## 2024-12-02 PROCEDURE — 93798 PHYS/QHP OP CAR RHAB W/ECG: CPT | Performed by: INTERNAL MEDICINE

## 2024-12-04 ENCOUNTER — CLINICAL SUPPORT (OUTPATIENT)
Dept: CARDIAC REHAB | Facility: CLINIC | Age: 74
End: 2024-12-04
Payer: MEDICARE

## 2024-12-04 DIAGNOSIS — Z95.1 POSTSURGICAL AORTOCORONARY BYPASS STATUS: ICD-10-CM

## 2024-12-04 PROCEDURE — 93798 PHYS/QHP OP CAR RHAB W/ECG: CPT | Performed by: INTERNAL MEDICINE

## 2024-12-04 NOTE — PROGRESS NOTES
Edward D Hume  1950  [id number]  74 y.o.    [facility] [place] [id number] [id number]      Cardiac/Pulmonary Rehab Nutrition Education    12/4/2024  Label reading education was done with program's RDN during today's visit. Label Reading Highlights handout was given with information discussed during appointment and to assist in review of label contents at home.      Signature Nora Acosta RDN, LD

## 2024-12-06 ENCOUNTER — CLINICAL SUPPORT (OUTPATIENT)
Dept: CARDIAC REHAB | Facility: CLINIC | Age: 74
End: 2024-12-06
Payer: MEDICARE

## 2024-12-06 DIAGNOSIS — Z95.1 POSTSURGICAL AORTOCORONARY BYPASS STATUS: ICD-10-CM

## 2024-12-06 PROCEDURE — 93798 PHYS/QHP OP CAR RHAB W/ECG: CPT | Performed by: INTERNAL MEDICINE

## 2024-12-09 ENCOUNTER — CLINICAL SUPPORT (OUTPATIENT)
Dept: CARDIAC REHAB | Facility: CLINIC | Age: 74
End: 2024-12-09
Payer: MEDICARE

## 2024-12-09 ENCOUNTER — DOCUMENTATION (OUTPATIENT)
Dept: CARDIAC REHAB | Facility: CLINIC | Age: 74
End: 2024-12-09

## 2024-12-09 DIAGNOSIS — Z95.1 POSTSURGICAL AORTOCORONARY BYPASS STATUS: ICD-10-CM

## 2024-12-09 PROCEDURE — 93798 PHYS/QHP OP CAR RHAB W/ECG: CPT | Performed by: INTERNAL MEDICINE

## 2024-12-09 NOTE — PROGRESS NOTES
Cardiac Rehabilitation Discharge    Name: Edward D Hume  Medical Record Number: [id number]  YOB: 1950  Age: 74 y.o.    Today’s Date: 12/9/2024  Primary Care Physician: Roselia Ibrahim MD  Referring Physician: Jerrod Anthony MD  Program Location: [address]  Primary Diagnosis: CABG     Onset/Date of Diagnosis: 5/2/2024    Session #: 36     AACVPR Risk Stratification: Moderate    Falls Risk: Medium  Psychosocial Assessment   Initial PHQ-9:  4  Sent PH-Q 9 to MD if score > 20: No; score < 20  Post PHQ-9:  6  Pt reported/currently experiencing stress: No  Patient uses stress management skills: No   History of: no history of anxiety or depression  Currently seeing a mental health provider: Yes, Then provider name: in progress  Social Support: Yes, Whom:in progress  Quality of Life Survey: SF-36   SF-36 Pre Post   Physical Component Score     Mental Component Score       Learning Assessment:  Learning assessment/barriers: None  Preferred learning method: Auditory and Visual  Barriers: None    Stages of Change:  Maintenance    Psychosocial Plan    Goal Status: In Progress    Psychosocial Goals: Demonstrating proper techniques for stress management and Maintain or lower PH-Q 9 score by discharge    Psychosocial Interventions/Education:   8/27/24 Initial PHQ-9 score reviewed w/ pt at eval/SD  10/14/2024 pt states no change in psychosocial status/MS  12/9/24 outcome remains in progress as post PHQ-9 score increased/MS      Nutrition Assessment:    Hyperlipidemia: Yes     Lipids:   Lab Results   Component Value Date    CHOL 120 09/24/2024    HDL 29.3 09/24/2024    LDLF 61 09/25/2023    TRIG 258 (H) 02/23/2024       Current Dietary Guidelines: Low fat, Low sodium  Barriers to dietary change: no    Diet Habit Survey: Picture Your Plate  Pre: 61  Post: 58    Diabetes Assessment    Lab Results   Component Value Date    HGBA1C 6.3 (H) 09/24/2024       History of Diabetes:  Pt monitors BS at  "home: Yes   Frequency: 0 /day  FBS range: unknown -   Hypoglycemic Episodes:  unknown    Weight Management  Height: 180.3 cm (5' 10.98\")  Weight: 90.3 kg (199 lb)  BMI (Calculated): 27.77    Nutrition Plan    Goal Status: In Progress    Nutrition Goals: Lipid Goal: HDL>45, LDL <70, Total <180, Trigs <150, Improve Diet Habit Survey score by 5-10 points by discharge, Adapt a low-sodium, DASH diet prior to discharge, and Adapt a Mediterranean focused diet prior to discharge    Nutrition Interventions/Education:   9/4/2024  Gave and reviewed PYP with patient. PYP score 61. Pt has made changes to his diet since completing the PYP. He has decreased his intake of processed meats in the morning and now eats oatmeal with banana. Reviewed with him adding protein/healthy fats to carbohydrates to help better manage his blood sugars. Reviewed protein/healthy fat ideas to add. Gave Heart Healthy Tips handout to review at home and follow up with questions as needed. /Marli Acosta RDN, REYNALDO  10/2/2024  Label reading education was done with program's RDN during today's visit. Label Reading Highlights handouts was given for review of label contents/PAPO  11/6/2024  Patient attended Heart Healthy Diet lecture with program RDN during today's exercise session. Heart Healthy diet tips sheet was given for further review at home and patient was encouraged to come back with any follow up questions./Marli Acosta RDN, REYNALDO  12/4/2024  Label reading education was done with program's RDN during today's visit. Label Reading Highlights handout was given with information discussed during appointment and to assist in review of label contents at home.  Marli Acosta RDN, REYNALDO  12/9/24 outcome remains in progress as post diet survey score decreased/MS        Exercise Assessment    Home Exercise:  No  Mode: NA  Frequency: NA  Duration: NA    Exercise Prescription     Exercise Prescription based on: Duke Activity " Status Index (DASI)    DASI Score: 31.45  MET Score: 6.6   Frequency:  3 days/week   Mode: Treadmill, NuStep, and Recumbent Cycle   Duration: 45 total aerobic minutes   Intensity: RPE 12-16  Target HR:    MET Level: 4.8  Patient wears supplemental O2: No     Modality Workload METs Duration (minutes)   1 Pre-Exercise   2:00   2 Treadmill 2.9 mph @ 2.5% 4.3 15:00   3 NuStep 10@112 3.6 15:00   4 Recumbent Bike 8@60  4.8 15:00   5 Cooldown    5 :00   6 Post-Exercise   2:00     Resistance Training: No   Home Exercise Prescription given: yes    Exercise Plan    Goal Status: Met    Exercise Goals: Increase exercise MET level by 5-10% each week, Increase total exercise duration to 30-45 minutes, and Obtain 150 minutes/week of moderate intensity aerobic exercise    Exercise Interventions/Education:   9/11/24 adjusted THR/MS  9/13/24 gave pt home ex rx & log, encouraged pt to exercise on non-rehab days/MS  9/18/2024  Exercise prescription and maintenance education was done during today's session. Discussed benefits of exercise, FITT principle, reviewed perceived exertion scale, and patient's THR was given for home practice with instructions on how to obtain. Handouts were given for personal reference. Home exercise prescription is to be given prior to discharge from program/RP  10/30/2024   Stretching education was done with patient during today's rehab session. Demonstrations of standing stretches were done with patient, in addition to discussing the benefits of flexibility training. Patient was provided handout with examples of exercises and further instructions to be practiced at home. Resistance training guidelines were also discussed. Patient encouraged to report to rehab staff with any questions or concerns.  /Signature Tereso Edmonds RN  11/13/2024  Cardiovascular changes with exercise were discussed in today's session. Both immediate and long term effects of exercise were covered and the importance of  cooling down post workout was reviewed. Signature Tereso Edmonds RN    Other Core Components/Risk Factor Assessment:    Medication adherence  Current Medications:   Medication Documentation Review Audit       Reviewed by SAGAR MaeCNP (Nurse Practitioner) on 10/07/24 at 1211      Medication Order Taking? Sig Documenting Provider Last Dose Status   albuterol 90 mcg/actuation inhaler [id number] No Inhale 2 puffs every 6 hours if needed for shortness of breath. Pj Ivy, DO Taking Active   allopurinol (Zyloprim) 300 mg tablet [id number] No Take 1 tablet (300 mg) by mouth once daily. Pj Ivy, DO Taking Active   amLODIPine (Norvasc) 10 mg tablet [id number] No Take 1 tablet (10 mg) by mouth once daily. Pj Ivy, DO Taking Active   aspirin 81 mg EC tablet [id number] No Take 1 tablet (81 mg) by mouth once daily. Pj Ivy, DO Taking Active   atorvastatin (Lipitor) 80 mg tablet [id number] No Take 1 tablet (80 mg) by mouth once daily at bedtime. MERCY Hua-CNP Taking Active   biotin 10 mg tablet [id number] No Take 1 tablet (10 mg) by mouth once daily. Historical Provider, MD Taking Active   cranberry fruit extract (cranberry extract) 300 mg tablet [id number] No Take 650 mg by mouth once daily. Historical Provider, MD Taking Active   famotidine (Pepcid) 20 mg tablet [id number] No Take 1 tablet (20 mg) by mouth once daily at bedtime. Pj Ivy, DO Taking Active   gemfibrozil (Lopid) 600 mg tablet [id number] No Take 1 tablet (600 mg) by mouth 2 times a day. Pj Ivy, DO Taking Active   glipiZIDE XL (Glucotrol XL) 10 mg 24 hr tablet [id number] No Take 2 tablets (20 mg) by mouth once daily. Pj Ivy, DO Taking Active   insulin glargine (Toujeo SoloStar U-300 Insulin) 300 unit/mL (1.5 mL) injection [id number] No Inject 36 Units under the skin once daily at bedtime. Take as directed per insulin instructions. Pj Ivy DO Taking Active   Januvia 100 mg tablet [id number]  "No Take 1 tablet (100 mg) by mouth once daily. Pj Ivy DO Taking Active   lidocaine 4 % cream [id number] No Apply topically 4 times a day as needed for mild pain (1 - 3). Pj Ivy DO Taking Active   metoprolol tartrate (Lopressor) 25 mg tablet [id number] No Take 1 tablet (25 mg) by mouth 2 times a day. Pj Ivy DO Taking Active   Discontinued 10/03/24 1305   omeprazole (PriLOSEC) 40 mg DR capsule [id number]  Take 1 capsule (40 mg) by mouth once daily in the morning. Take before meals. Roselia Ibrahim MD  Active   pen needle, diabetic (BD Ultra-Fine Short Pen Needle) 31 gauge x 5/16\" needle [id number] No USE AS DIRECTED Pj Ivy DO Taking Active   tamsulosin (Flomax) 0.4 mg 24 hr capsule [id number] No Take 1 capsule (0.4 mg) by mouth once daily. Pj Ivy DO Taking Active   valsartan-hydrochlorothiazide (Diovan-HCT) 160-12.5 mg tablet [id number] No Take 1 tablet by mouth once daily. Pj Ivy DO Taking Active                                 Medication compliance: Yes   Uses pill box/organizer: No    Carries medication list: Yes     Blood Pressure Management  History of Hypertension: Yes   Medication Changes: No   Resting BP:  WNL    Heart Failure Management  Hx of Heart Failure: No    Smoking/Tobacco Assessment  Social History     Tobacco Use   Smoking Status Former    Types: Cigarettes   Smokeless Tobacco Never       Other Core Component Plan    Goal Status: Met    Other Core Component Goals: Medication compliance, Verbalize medication usage and drug actions by discharge, and Achieve resting BP of < 130/80 by discharge    Other Core Component Interventions/Education:   9/11/24 gave pt CR book, reviewed lipid profile/MS  9/25/24  Coronary artery disease education was done during today's session. Signs and symptoms of possible heart attack, risk factors for prevention, and lifestyle behavior modifications were all explained. Handout for personal use was given/RP. "   10/9/2024  Discussion of prescribed drug names, doses, side effects, and medication uses was done with patient. Education handout, Medications Used for Cardiac Conditions was also given to the patient. Patient was instructed to come back with any questions/SD  10/23/2024  Risk factors of cardiovascular disease were discussed today's. Education on modifiable and non-modifiable risk factors of CVD and recommendations to manage them was done today. Handouts were provided along with further reference. Patient was educated to comeback with further questions/RP      Individual Patient Goals:    Increase strength and endurance by discharge-Met  Establish a regular exercise routine 3-5 days a week by discharge -In Progress    Goal Status:Met/In progress    Staff Comments:  Mr. Hume has graduated from the Phase II Cardiac Rehab Program.  He met half of the outcomes set for him as well half of his personal goals.  No cardiac complaints voiced.      Rehab Staff Signature: Dana Gracia MS, AACVPR-CCRP

## 2024-12-09 NOTE — PATIENT INSTRUCTIONS
Cardiac Rehabilitation: Home Exercise Handout    The American College of Sports Medicine and American Heart Association recommends engaging in aerobic exercise 5 to 7 times per week, and staying within your target heart rate range for a minimum of 30 to 60 minutes per session. The following guidelines and components of your home exercise program are similar to those in your Cardiac Rehabilitation program.     The following are recommendations for your exercise routine based on the FITT principle:    Frequency:   5 to 7 days per week    Intensity:   Target Heart Rate =      RPE = 12 - 16    Time:   30+ minutes per session; 150 total minutes per week    Type:   Aerobic exercise    Specific Recommendations: Any aerobic activity that meet goal.      Warm Up  It is important to start off slow to give your body a change to get used to the increased workload and transition from rest to exercise. Your warm up should consist of a slow paced activity for 2-5 minutes.    Example:  2 - 5 minutes of walking or marching in place     Aerobic Exercises   (Cardio)   If you have home exercise equipment, this is where you can use it! If not, there are still lots of ways to be active at home or outside. The minimum time for aerobic exercise is 10 minutes, building up to 30 - 60 minutes.    Examples: walking, cycling, or dancing     Resistance Training   (Strength, Free Weights, Resistance Bands, Body)   Body weight or resistance exercises can be done at minimum 2 times per week and are important to build up your physical strength and endurance. Your resistance routine should include 6 to 10 different exercises that can be done 10-15 times (repetitions) repeating 1-2 sets. Spend 15 - 20 minutes on these exercises.     Examples: See suggestions from resistance training handout or ask staff       Cool Down   It is important to allow your heart rate and blood pressure to gradually recover from the exercise training phase. The  "cool-down period should be about 5 minutes in total and be incorporated with flexibility or stretching exercises.    Examples: See suggestions from stretching handout      If you're not using a target heart rate, you can ask yourself \"How difficult is the work I am doing?\" By using the RPE scale attached, work should not be too light or too hard. Aim to work at the 12 - 15 level on this scale. You can also check in on your shortness of breath using the RPD scale, where you should be at or below a 3 - Moderate shortness of breath while exercising.                        If you have any additional questions about your home exercise plan, feel free to contact your  at rehabilitation.     Rehab Staff Signed: Tereso Edmonds RN   "

## 2024-12-09 NOTE — PROGRESS NOTES
Phase II Cardiac Rehabilitation Performance Measure Report    Patient Name: Edward D Hume   : 1950   MRN: [id number]   : Dana Gracia M.S.,CCRP Start Date: 2024  End Date: 2024  # Sessions Completed: 36     Patient's Stated Goals:   Establish a regular exercise routine by discharge  Achieved: In Progress    Increase strength and endurance by discharge  Achieved: Yes  Comments: continue with exercise routine.    Program Outcome Goals:    Tobacco Cessation  Social History     Tobacco Use    Smoking status: Former     Types: Cigarettes    Smokeless tobacco: Never   Vaping Use    Vaping status: Never Used   Substance Use Topics    Alcohol use: Not Currently     Comment: Occasionally    Drug use: Never      Achieved: Not Applicable  Comments/Long-Term Goals:    Lipids  Lab Results   Component Value Date    CHOL 120 2024    CHOL 140 2024    HDL 29.3 2024    HDL 22.7 2024    LDLCALC 66 2024    TRIG 258 (H) 2024    TRIG 303 (H) 2023     Achieved: In Progress  Comments/Long-Term Goals:  HDL > 45; Trig. < 150    Physical Activity  Duration   Pre: 27 minutes   Post: 45 minutes   % Change: 67  METS (Intensity)   Pre: 2.7   Post: 4.8   % Change: 78    Achieved: Yes  Comments/Long-Term Goals: continue with exercise routine    Diabetes     Lab Results   Component Value Date    HGBA1C 6.3 (H) 2024    HGBA1C 6.1 2024      Achieved: Yes  Comments/Long-Term Goals: maintain; HgbA1C<7.0    Nutrition:   Picture Your Plate  Pre: 61  Post: 58    Achieved: In Progress  Comments/Long-Term Goals: continue to follow a Heart Healthy/Mediterranean Diet    Psychosocial:   PHQ-9  Pre: 4  Post: 6    Achieved: Yes  Comments/Long-Term Goals: maintain    Rehab Staff Signature: Tereso Edmonds RN  Date: 2024

## 2024-12-12 ENCOUNTER — APPOINTMENT (OUTPATIENT)
Dept: DERMATOLOGY | Facility: CLINIC | Age: 74
End: 2024-12-12
Payer: MEDICARE

## 2024-12-12 DIAGNOSIS — L21.9 SEBORRHEIC DERMATITIS: ICD-10-CM

## 2024-12-12 DIAGNOSIS — L82.1 SEBORRHEIC KERATOSIS: ICD-10-CM

## 2024-12-12 DIAGNOSIS — L57.0 ACTINIC KERATOSIS: ICD-10-CM

## 2024-12-12 DIAGNOSIS — D22.5 MELANOCYTIC NEVUS OF TRUNK: ICD-10-CM

## 2024-12-12 DIAGNOSIS — D48.5 NEOPLASM OF UNCERTAIN BEHAVIOR OF SKIN: Primary | ICD-10-CM

## 2024-12-12 DIAGNOSIS — Z87.2 HISTORY OF ACTINIC KERATOSES: ICD-10-CM

## 2024-12-12 DIAGNOSIS — D18.01 HEMANGIOMA OF SKIN: ICD-10-CM

## 2024-12-12 DIAGNOSIS — L57.8 DIFFUSE PHOTODAMAGE OF SKIN: ICD-10-CM

## 2024-12-12 PROCEDURE — 3044F HG A1C LEVEL LT 7.0%: CPT | Performed by: DERMATOLOGY

## 2024-12-12 PROCEDURE — 1159F MED LIST DOCD IN RCRD: CPT | Performed by: DERMATOLOGY

## 2024-12-12 PROCEDURE — 11301 SHAVE SKIN LESION 0.6-1.0 CM: CPT | Performed by: DERMATOLOGY

## 2024-12-12 PROCEDURE — 99214 OFFICE O/P EST MOD 30 MIN: CPT | Performed by: DERMATOLOGY

## 2024-12-12 PROCEDURE — 17004 DESTROY PREMAL LESIONS 15/>: CPT | Performed by: DERMATOLOGY

## 2024-12-12 PROCEDURE — 11302 SHAVE SKIN LESION 1.1-2.0 CM: CPT | Performed by: DERMATOLOGY

## 2024-12-12 PROCEDURE — 11306 SHAVE SKIN LESION 0.6-1.0 CM: CPT | Performed by: DERMATOLOGY

## 2024-12-12 PROCEDURE — 3048F LDL-C <100 MG/DL: CPT | Performed by: DERMATOLOGY

## 2024-12-12 PROCEDURE — 3060F POS MICROALBUMINURIA REV: CPT | Performed by: DERMATOLOGY

## 2024-12-13 NOTE — PROGRESS NOTES
Subjective     Edward D Hume is a 74 y.o. male who presents for the following: Skin Check.  He notes a red bump behind his left ear, which has been present for a few months, has increased in size, and is tender to touch.  He also notes dry, flaky skin inside his ears.  He denies any other new, changing, or concerning skin lesions since his last visit; no bleeding, itching, or burning lesions.      Review of Systems:  No other skin or systemic complaints other than what is documented elsewhere in the note.    The following portions of the chart were reviewed this encounter and updated as appropriate:       Skin Cancer History  No skin cancer on file.    Specialty Problems          Dermatology Problems    Melanocytic nevi of scalp and neck    Postoperative scar       Past Dermatologic / Past Relevant Medical History:    - history of AKs   - seborrheic dermatitis  - no h/o atypical nevi or skin cancer    Family History:    No family history of melanoma or skin cancer    Social History:    The patient is retired from working as a  and designing CT scan machines; he enjoys grilling; he recent underwent minimally invasive CABG surgery    Allergies:  Amoxicillin, Metformin, and Iodinated contrast media    Current Medications / CAM's:    Current Outpatient Medications:     albuterol 90 mcg/actuation inhaler, Inhale 2 puffs every 6 hours if needed for shortness of breath., Disp: 18 g, Rfl: 2    allopurinol (Zyloprim) 300 mg tablet, Take 1 tablet (300 mg) by mouth once daily., Disp: 30 tablet, Rfl: 4    amLODIPine (Norvasc) 10 mg tablet, Take 1 tablet (10 mg) by mouth once daily., Disp: 30 tablet, Rfl: 4    aspirin 81 mg EC tablet, Take 1 tablet (81 mg) by mouth once daily., Disp: 30 tablet, Rfl: 4    atorvastatin (Lipitor) 80 mg tablet, Take 1 tablet (80 mg) by mouth once daily at bedtime., Disp: 90 tablet, Rfl: 3    biotin 10 mg tablet, Take 1 tablet (10 mg) by mouth once daily., Disp: , Rfl:     cranberry  "fruit extract (cranberry extract) 300 mg tablet, Take 650 mg by mouth once daily., Disp: , Rfl:     famotidine (Pepcid) 20 mg tablet, Take 1 tablet (20 mg) by mouth once daily at bedtime., Disp: 30 tablet, Rfl: 4    gemfibrozil (Lopid) 600 mg tablet, Take 1 tablet (600 mg) by mouth 2 times a day., Disp: 60 tablet, Rfl: 2    glipiZIDE XL (Glucotrol XL) 10 mg 24 hr tablet, Take 2 tablets (20 mg) by mouth once daily., Disp: 60 tablet, Rfl: 2    insulin glargine (Toujeo SoloStar U-300 Insulin) 300 unit/mL (1.5 mL) injection, Inject 36 Units under the skin once daily at bedtime. Take as directed per insulin instructions., Disp: 10.8 mL, Rfl: 1    Januvia 100 mg tablet, Take 1 tablet (100 mg) by mouth once daily., Disp: 30 tablet, Rfl: 3    lidocaine 4 % cream, Apply topically 4 times a day as needed for mild pain (1 - 3)., Disp: 30 g, Rfl: 1    metoprolol tartrate (Lopressor) 25 mg tablet, Take 1 tablet (25 mg) by mouth 2 times a day., Disp: 180 tablet, Rfl: 3    omeprazole (PriLOSEC) 40 mg DR capsule, Take 1 capsule (40 mg) by mouth once daily in the morning. Take before meals., Disp: 30 capsule, Rfl: 4    pen needle, diabetic (BD Ultra-Fine Short Pen Needle) 31 gauge x 5/16\" needle, USE AS DIRECTED, Disp: 100 each, Rfl: 3    tamsulosin (Flomax) 0.4 mg 24 hr capsule, Take 1 capsule (0.4 mg) by mouth once daily., Disp: 180 capsule, Rfl: 0    valsartan-hydrochlorothiazide (Diovan-HCT) 160-12.5 mg tablet, Take 1 tablet by mouth once daily., Disp: 30 tablet, Rfl: 4     Objective   Well appearing patient in no apparent distress; mood and affect are within normal limits.    A full examination was performed including scalp, face, eyes, ears, nose, lips, neck, chest, axillae, abdomen, back, bilateral upper extremities, and bilateral lower extremities. All findings within normal limits unless otherwise noted below.    Assessment/Plan   1. Neoplasm of uncertain behavior of skin (3)  Left Post-Auricular Neck  6 mm erythematous, " scaly, tender papule           Shave removal    Lesion length (cm):  0.6  Margin per side (cm):  0  Lesion diameter (cm):  0.6  Informed consent: discussed and consent obtained    Timeout: patient name, date of birth, surgical site, and procedure verified    Procedure prep:  Patient was prepped and draped  Anesthesia: the lesion was anesthetized in a standard fashion    Anesthetic:  1% lidocaine w/ epinephrine 1-100,000 local infiltration  Instrument used: flexible razor blade    Hemostasis achieved with: aluminum chloride    Outcome: patient tolerated procedure well    Post-procedure details: sterile dressing applied and wound care instructions given    Dressing type: bandage and petrolatum      Staff Communication: Dermatology Local Anesthesia: 1 % Lidocaine / Epinephrine - Amount:0.5ml    Specimen 1 - Dermatopathology- DERM LAB  Differential Diagnosis: cyst v SCC v HAK  Check Margins Yes/No?:    Comments:    Dermpath Lab: Routine Histopathology (formalin-fixed tissue)    Left Dorsal Mid-Forearm  1.2 cm erythematous, scaly plaque          Shave removal    Lesion length (cm):  1.2  Margin per side (cm):  0  Lesion diameter (cm):  1.2  Informed consent: discussed and consent obtained    Timeout: patient name, date of birth, surgical site, and procedure verified    Procedure prep:  Patient was prepped and draped  Anesthesia: the lesion was anesthetized in a standard fashion    Anesthetic:  1% lidocaine w/ epinephrine 1-100,000 local infiltration  Instrument used: flexible razor blade    Hemostasis achieved with: aluminum chloride    Outcome: patient tolerated procedure well    Post-procedure details: sterile dressing applied and wound care instructions given    Dressing type: bandage and petrolatum      Staff Communication: Dermatology Local Anesthesia: 1 % Lidocaine / Epinephrine - Amount:0.5ml    Specimen 2 - Dermatopathology- DERM LAB  Differential Diagnosis: SK v HAK v SCCIS  Check Margins Yes/No?:    Comments:     Dermpath Lab: Routine Histopathology (formalin-fixed tissue)    Right Upper Back  7 mm erythematous, scaly papule           Shave removal    Lesion length (cm):  0.7  Margin per side (cm):  0  Lesion diameter (cm):  0.7  Informed consent: discussed and consent obtained    Timeout: patient name, date of birth, surgical site, and procedure verified    Procedure prep:  Patient was prepped and draped  Anesthesia: the lesion was anesthetized in a standard fashion    Anesthetic:  1% lidocaine w/ epinephrine 1-100,000 local infiltration  Instrument used: flexible razor blade    Hemostasis achieved with: aluminum chloride    Outcome: patient tolerated procedure well    Post-procedure details: sterile dressing applied and wound care instructions given    Dressing type: bandage and petrolatum      Staff Communication: Dermatology Local Anesthesia: 1 % Lidocaine / Epinephrine - Amount:0.5ml    Specimen 3 - Dermatopathology- DERM LAB  Differential Diagnosis: ISK v BLK v BCC v SCCIS  Check Margins Yes/No?:    Comments:    Dermpath Lab: Routine Histopathology (formalin-fixed tissue)    2. Actinic keratosis (23)  Scalp (23)  Scattered on the patient's scalp and face, there are multiple erythematous, gritty, scaly macules     Actinic Keratoses -scattered on scalp and face.  The pre-cancerous nature of these lesions and treatment options, including liquid nitrogen cryotherapy and field therapy, such as with a course of topical therapy with Efudex 5% cream as well as photodynamic therapy, were discussed extensively with the patient today.  After discussing the risks and benefits of each of these options at length, the patient expressed understanding and again declined a course of topical field therapy with Efudex 5% cream or PDT and wishes to proceed with further cryotherapy for now.    Destr of lesion - Scalp (23)  Complexity: simple    Destruction method: cryotherapy    Informed consent: discussed and consent obtained    Lesion  destroyed using liquid nitrogen: Yes    Cryotherapy cycles:  1  Outcome: patient tolerated procedure well with no complications    Post-procedure details: wound care instructions given      3. Melanocytic nevus of trunk  Scattered on the patient's face, neck, trunk, and extremities, there are several small, round- to oval-shaped, brown-pigmented and pink-colored, symmetric, uniform-appearing macules and dome-shaped papules    Clinically benign- to slightly atypical-appearing nevi - the clinically benign- to slightly atypical-appearing nature of the patient's nevi was discussed with the patient today.  None of the patient's nevi meet threshold for biopsy today.  I emphasized the importance of performing monthly self-skin exams using the ABCDs of monitoring moles, which were reviewed with the patient today and an informational hand-out provided.  I also emphasized the importance of sun avoidance and sun protection with daily sunscreen use.  The patient expressed understanding and is in agreement with this plan.    4. Seborrheic keratosis  Scattered on the patient's face, neck, trunk, and extremities, there are multiple tan- to light brown-colored, hyperkeratotic, stuck-on appearing papules of varying size and shape    Seborrheic Keratoses - the benign nature of these lesions was discussed with the patient today and reassurance provided.  No treatment is medically indicated for these lesions at this time.    5. Hemangioma of skin  Scattered on the patient's face, neck, trunk, and extremities, there are multiple small, round, cherry red- to purplish-colored, symmetric, uniform, vascular-appearing macules and papules    Cherry Angiomas - the benign nature of these vascular lesions was discussed with the patient today and reassurance provided.  No treatment is medically indicated for these lesions at this time.    6. Seborrheic dermatitis  Left Ear  In his bilateral external ear canals, there are pink, scaly patches with  whitish-yellowish, greasy scale    Seborrheic Dermatitis -flare in bilateral external ear canals.  The potentially chronic and intermittently flaring nature of this condition and treatment options were discussed extensively with the patient today.  At this time, I recommend topical anti-fungal therapy with Ketoconazole 2% cream, which the patient was instructed to apply twice daily to the affected areas of his ears.  The risks, benefits, and side effects of this medication were discussed.  The patient expressed understanding and is in agreement with this plan.    7. History of actinic keratoses  There is evidence of photodamage in sun-exposed areas.    History of actinic keratoses and photodamage.  The signs and symptoms of skin cancer were reviewed and the patient was advised to practice sun protection and sun avoidance, use daily sunscreen, and perform regular self skin exams.  I will have the patient return to our office in 4-6 months, pending the above biopsy results, for routine follow-up and skin exam, and the patient was instructed to call our office should the patient notice any new, changing, symptomatic, or otherwise concerning skin lesions before then.  The patient expressed understanding and is in agreement with this plan.    8. Diffuse photodamage of skin  Photodistributed  Diffuse photodamage with actinic changes with telangiectasia and mottled pigmentation in sun-exposed areas.    Photodamage.  The signs and symptoms of skin cancer were reviewed and the patient was advised to practice sun protection and sun avoidance, use daily sunscreen, and perform regular self skin exams.  Sun protection was discussed, including avoiding the mid-day sun, wearing a sunscreen with SPF at least 50, and stressing the need for reapplication of sunscreen and applying more than they think they need.

## 2025-01-13 ENCOUNTER — APPOINTMENT (OUTPATIENT)
Dept: PRIMARY CARE | Facility: CLINIC | Age: 75
End: 2025-01-13
Payer: MEDICARE

## 2025-01-13 VITALS
HEIGHT: 72 IN | DIASTOLIC BLOOD PRESSURE: 68 MMHG | SYSTOLIC BLOOD PRESSURE: 145 MMHG | BODY MASS INDEX: 30.07 KG/M2 | WEIGHT: 222 LBS

## 2025-01-13 DIAGNOSIS — N18.31 STAGE 3A CHRONIC KIDNEY DISEASE (MULTI): Chronic | ICD-10-CM

## 2025-01-13 DIAGNOSIS — Z95.1 S/P CABG X 1: Primary | ICD-10-CM

## 2025-01-13 DIAGNOSIS — M10.9 GOUT, UNSPECIFIED CAUSE, UNSPECIFIED CHRONICITY, UNSPECIFIED SITE: ICD-10-CM

## 2025-01-13 DIAGNOSIS — Z12.11 COLON CANCER SCREENING: ICD-10-CM

## 2025-01-13 DIAGNOSIS — E11.9 TYPE 2 DIABETES MELLITUS WITHOUT COMPLICATION, WITH LONG-TERM CURRENT USE OF INSULIN (MULTI): ICD-10-CM

## 2025-01-13 DIAGNOSIS — N40.0 BENIGN PROSTATIC HYPERPLASIA WITHOUT LOWER URINARY TRACT SYMPTOMS: Chronic | ICD-10-CM

## 2025-01-13 DIAGNOSIS — E11.9 TYPE 2 DIABETES MELLITUS WITHOUT COMPLICATIONS (MULTI): ICD-10-CM

## 2025-01-13 DIAGNOSIS — L90.5 POSTOPERATIVE SCAR: ICD-10-CM

## 2025-01-13 DIAGNOSIS — R93.1 ELEVATED CORONARY ARTERY CALCIUM SCORE: ICD-10-CM

## 2025-01-13 DIAGNOSIS — R06.02 SHORTNESS OF BREATH: ICD-10-CM

## 2025-01-13 DIAGNOSIS — I10 HYPERTENSION, UNSPECIFIED TYPE: ICD-10-CM

## 2025-01-13 DIAGNOSIS — E11.22 TYPE 2 DIABETES MELLITUS WITH STAGE 3A CHRONIC KIDNEY DISEASE, WITH LONG-TERM CURRENT USE OF INSULIN (MULTI): Chronic | ICD-10-CM

## 2025-01-13 DIAGNOSIS — E78.2 MIXED HYPERLIPIDEMIA: Chronic | ICD-10-CM

## 2025-01-13 DIAGNOSIS — N18.31 TYPE 2 DIABETES MELLITUS WITH STAGE 3A CHRONIC KIDNEY DISEASE, WITH LONG-TERM CURRENT USE OF INSULIN (MULTI): Chronic | ICD-10-CM

## 2025-01-13 DIAGNOSIS — Z79.4 TYPE 2 DIABETES MELLITUS WITHOUT COMPLICATION, WITH LONG-TERM CURRENT USE OF INSULIN (MULTI): ICD-10-CM

## 2025-01-13 DIAGNOSIS — I10 PRIMARY HYPERTENSION: Chronic | ICD-10-CM

## 2025-01-13 DIAGNOSIS — Z98.890 POSTOPERATIVE SCAR: ICD-10-CM

## 2025-01-13 DIAGNOSIS — K21.9 GASTROESOPHAGEAL REFLUX DISEASE WITHOUT ESOPHAGITIS: Chronic | ICD-10-CM

## 2025-01-13 DIAGNOSIS — D22.4 MELANOCYTIC NEVI OF SCALP AND NECK: ICD-10-CM

## 2025-01-13 DIAGNOSIS — N40.0 BENIGN PROSTATIC HYPERPLASIA, UNSPECIFIED WHETHER LOWER URINARY TRACT SYMPTOMS PRESENT: ICD-10-CM

## 2025-01-13 DIAGNOSIS — Z79.4 TYPE 2 DIABETES MELLITUS WITH STAGE 3A CHRONIC KIDNEY DISEASE, WITH LONG-TERM CURRENT USE OF INSULIN (MULTI): Chronic | ICD-10-CM

## 2025-01-13 PROCEDURE — 3008F BODY MASS INDEX DOCD: CPT | Performed by: INTERNAL MEDICINE

## 2025-01-13 PROCEDURE — 99214 OFFICE O/P EST MOD 30 MIN: CPT | Performed by: INTERNAL MEDICINE

## 2025-01-13 PROCEDURE — 3077F SYST BP >= 140 MM HG: CPT | Performed by: INTERNAL MEDICINE

## 2025-01-13 PROCEDURE — G2211 COMPLEX E/M VISIT ADD ON: HCPCS | Performed by: INTERNAL MEDICINE

## 2025-01-13 PROCEDURE — 3078F DIAST BP <80 MM HG: CPT | Performed by: INTERNAL MEDICINE

## 2025-01-13 RX ORDER — ALLOPURINOL 300 MG/1
300 TABLET ORAL DAILY
Qty: 30 TABLET | Refills: 4 | Status: SHIPPED | OUTPATIENT
Start: 2025-01-13

## 2025-01-13 RX ORDER — INSULIN GLARGINE 300 [IU]/ML
36 INJECTION, SOLUTION SUBCUTANEOUS NIGHTLY
Qty: 10.8 ML | Refills: 1 | Status: SHIPPED | OUTPATIENT
Start: 2025-01-13 | End: 2025-07-12

## 2025-01-13 RX ORDER — SITAGLIPTIN 100 MG/1
100 TABLET, FILM COATED ORAL DAILY
Qty: 30 TABLET | Refills: 3 | Status: SHIPPED | OUTPATIENT
Start: 2025-01-13

## 2025-01-13 RX ORDER — ALBUTEROL SULFATE 90 UG/1
2 INHALANT RESPIRATORY (INHALATION) EVERY 6 HOURS PRN
Qty: 18 G | Refills: 2 | Status: SHIPPED | OUTPATIENT
Start: 2025-01-13

## 2025-01-13 RX ORDER — FAMOTIDINE 20 MG/1
20 TABLET, FILM COATED ORAL NIGHTLY
Qty: 30 TABLET | Refills: 4 | Status: SHIPPED | OUTPATIENT
Start: 2025-01-13

## 2025-01-13 RX ORDER — TAMSULOSIN HYDROCHLORIDE 0.4 MG/1
0.4 CAPSULE ORAL DAILY
Qty: 180 CAPSULE | Refills: 0 | Status: SHIPPED | OUTPATIENT
Start: 2025-01-13

## 2025-01-13 RX ORDER — ASPIRIN 81 MG/1
81 TABLET ORAL DAILY
Qty: 30 TABLET | Refills: 4 | Status: SHIPPED | OUTPATIENT
Start: 2025-01-13

## 2025-01-13 RX ORDER — VALSARTAN AND HYDROCHLOROTHIAZIDE 160; 12.5 MG/1; MG/1
1 TABLET, FILM COATED ORAL DAILY
Qty: 30 TABLET | Refills: 4 | Status: SHIPPED | OUTPATIENT
Start: 2025-01-13

## 2025-01-13 RX ORDER — GEMFIBROZIL 600 MG/1
600 TABLET, FILM COATED ORAL 2 TIMES DAILY
Qty: 60 TABLET | Refills: 2 | Status: SHIPPED | OUTPATIENT
Start: 2025-01-13

## 2025-01-13 RX ORDER — OMEPRAZOLE 40 MG/1
40 CAPSULE, DELAYED RELEASE ORAL
Qty: 30 CAPSULE | Refills: 4 | Status: SHIPPED | OUTPATIENT
Start: 2025-01-13

## 2025-01-13 RX ORDER — METOPROLOL TARTRATE 25 MG/1
25 TABLET, FILM COATED ORAL 2 TIMES DAILY
Qty: 180 TABLET | Refills: 3 | Status: SHIPPED | OUTPATIENT
Start: 2025-01-13 | End: 2026-01-13

## 2025-01-13 RX ORDER — GLIPIZIDE 10 MG/1
20 TABLET, FILM COATED, EXTENDED RELEASE ORAL DAILY
Qty: 60 TABLET | Refills: 2 | Status: SHIPPED | OUTPATIENT
Start: 2025-01-13

## 2025-01-13 RX ORDER — LIDOCAINE 40 MG/G
CREAM TOPICAL 4 TIMES DAILY PRN
Qty: 30 G | Refills: 1 | Status: SHIPPED | OUTPATIENT
Start: 2025-01-13 | End: 2026-01-13

## 2025-01-13 RX ORDER — BIOTIN 10 MG
10 TABLET ORAL DAILY
Qty: 90 TABLET | Refills: 0 | Status: SHIPPED | OUTPATIENT
Start: 2025-01-13 | End: 2025-04-13

## 2025-01-13 RX ORDER — PEN NEEDLE, DIABETIC 30 GX3/16"
NEEDLE, DISPOSABLE MISCELLANEOUS
Qty: 100 EACH | Refills: 3 | Status: SHIPPED | OUTPATIENT
Start: 2025-01-13

## 2025-01-13 RX ORDER — AMLODIPINE BESYLATE 10 MG/1
10 TABLET ORAL DAILY
Qty: 30 TABLET | Refills: 4 | Status: SHIPPED | OUTPATIENT
Start: 2025-01-13

## 2025-01-13 RX ORDER — GINSENG 100 MG
300 CAPSULE ORAL DAILY
Qty: 90 CAPSULE | Refills: 0 | Status: SHIPPED | OUTPATIENT
Start: 2025-01-13

## 2025-01-13 RX ORDER — ATORVASTATIN CALCIUM 80 MG/1
80 TABLET, FILM COATED ORAL NIGHTLY
Qty: 90 TABLET | Refills: 3 | Status: SHIPPED | OUTPATIENT
Start: 2025-01-13 | End: 2026-01-13

## 2025-01-13 ASSESSMENT — ENCOUNTER SYMPTOMS
WOUND: 0
DIZZINESS: 0
MYALGIAS: 1
DIFFICULTY URINATING: 0
COUGH: 0
CHILLS: 0
EYE DISCHARGE: 0
WHEEZING: 0
ABDOMINAL PAIN: 0
DYSURIA: 0
FLANK PAIN: 0
VOMITING: 0
DIARRHEA: 0
SHORTNESS OF BREATH: 0
NUMBNESS: 1
FEVER: 0
NAUSEA: 0
CONFUSION: 0

## 2025-01-13 NOTE — ASSESSMENT & PLAN NOTE
-Likely precipitated by diabetes, hypertension, or age  -Serum creatinine at baseline  -Continue valsartan for renal protection  -Continue monitor with

## 2025-01-13 NOTE — PROGRESS NOTES
Subjective   Subjective:     History Of Present Illness:  Edward D Hume is a 74 y.o. male with a PMH of HTN, HLD, OA, DM type 2, GERD, and Gout , who presents to [facility] for Follow-up appointment.  today patient complains of chronic left chest MSK soreness status post CABG associated with some mild paresthesia.  Denies other neurological symptoms such as weakness or loss of function in the left upper extremity.  Denies recent hospitalizations, infections, antibiotic use.  He was interested in pursuing  colon cancer screening with Cologuard.  Continues to deny DEXA scan.    Past Medical History:  He has a past medical history of Acute nonparalytic poliomyelitis ([facility]), Awareness under anesthesia, Body mass index (BMI) 25.0-25.9, adult (10/26/2019), Body mass index (BMI) 27.0-27.9, adult (09/22/2021), Body mass index (BMI) 29.0-29.9, adult (08/31/2019), CKD (chronic kidney disease), Crushing injury of unspecified finger(s), initial encounter (06/12/2014), Diabetes mellitus (Multi), Disorder of the skin and subcutaneous tissue, unspecified (09/04/2019), Disorder of the skin and subcutaneous tissue, unspecified (03/30/2016), Elevated prostate specific antigen (PSA), GERD (gastroesophageal reflux disease), Gout, History of falling (06/27/2016), HLD (hyperlipidemia), HTN (hypertension), Other chest pain (03/07/2018), Other conditions influencing health status (02/22/2017), Other conditions influencing health status (03/04/2016), Other conditions influencing health status, Personal history of other mental and behavioral disorders (11/06/2018), Personal history of other specified conditions (04/16/2019), Personal history of other specified conditions (04/16/2019), Personal history of other specified conditions (06/29/2016), Puncture wound without foreign body of left hand, initial encounter (03/05/2019), Puncture wound without foreign body of unspecified hand, initial encounter (03/05/2019), Superficial  mycosis, unspecified (12/01/2014), Trigger finger, left middle finger (07/24/2017), Trigger finger, left middle finger (07/24/2017), Trigger finger, left ring finger (07/24/2017), Trigger finger, right index finger (07/24/2017), and Trigger finger, right middle finger (11/10/2017).    Past Surgical History:  He has a past surgical history that includes Hand tendon surgery (02/15/2013); Other surgical history (04/09/2013); Other surgical history (07/02/2020); Other surgical history (12/06/2017); Other surgical history (06/12/2013); Other surgical history (06/12/2013); Shoulder surgery (06/12/2013); Cardiac catheterization; Knee arthroscopy w/ debridement; Cardiac catheterization (N/A, 05/01/2024); and Coronary artery bypass graft.     Social History:  He reports that he has quit smoking. His smoking use included cigarettes. He has never used smokeless tobacco. He reports that he does not currently use alcohol. He reports that he does not use drugs.    Family History:  No family history on file.  Allergies:  Amoxicillin, Metformin, and Iodinated contrast media    Home Medications:  (Not in a hospital admission)    Review Of Systems:  11-point ROS was performed and is negative except as noted below and in the HPI.     Review of Systems   Constitutional:  Negative for chills and fever.   Eyes:  Negative for discharge.   Respiratory:  Negative for cough, shortness of breath and wheezing.    Cardiovascular:  Negative for chest pain and leg swelling.   Gastrointestinal:  Negative for abdominal pain, diarrhea, nausea and vomiting.   Genitourinary:  Negative for difficulty urinating, dysuria and flank pain.   Musculoskeletal:  Positive for myalgias.   Skin:  Negative for wound.   Neurological:  Positive for numbness (over the left chest scar). Negative for dizziness.   Psychiatric/Behavioral:  Negative for confusion.         Objective   Objective:     /68   Ht 1.829 m (6')   Wt 101 kg (222 lb)   BMI 30.11 kg/m²      Physical Exam  Constitutional:       General: He is not in acute distress.     Appearance: Normal appearance.   HENT:      Head: Normocephalic and atraumatic.      Nose: Nose normal.      Mouth/Throat:      Mouth: Mucous membranes are moist.   Eyes:      Conjunctiva/sclera: Conjunctivae normal.      Pupils: Pupils are equal, round, and reactive to light.   Cardiovascular:      Rate and Rhythm: Normal rate and regular rhythm.      Heart sounds: Normal heart sounds.   Pulmonary:      Effort: Pulmonary effort is normal. No respiratory distress.      Breath sounds: Normal breath sounds.   Abdominal:      General: Bowel sounds are normal. There is no distension.      Palpations: Abdomen is soft.      Tenderness: There is no abdominal tenderness.   Musculoskeletal:         General: Tenderness (s/p CABG) present. No swelling.      Right lower leg: No edema.      Left lower leg: No edema.   Skin:     General: Skin is warm and dry.      Coloration: Skin is not jaundiced.   Neurological:      General: No focal deficit present.      Mental Status: He is alert. Mental status is at baseline.      Motor: Weakness present.          Assessment & Plan:     Assessment/Plan     Problem List Items Addressed This Visit       Benign prostatic hyperplasia without lower urinary tract symptoms (Chronic)     -Continue Tamsulosin         Stage 3a chronic kidney disease (Multi) (Chronic)     -Likely precipitated by diabetes, hypertension, or age  -Serum creatinine at baseline  -Continue valsartan for renal protection  -Continue monitor with         Elevated coronary artery calcium score    Gastroesophageal reflux disease without esophagitis (Chronic)     -Continue omeprazole 40 mg         Gout     -Continue Allopurinol  -Stable, without recent flare-ups         Mixed hyperlipidemia (Chronic)    Primary hypertension (Chronic)     -BP stable today  -Continue amlodipine 10 mg, metoprolol tartrate 25 mg twice daily, valsartan  hydrochlorothiazide 160-12.5 mg, refill sent         Type 2 diabetes mellitus with stage 3a chronic kidney disease, with long-term current use of insulin (Multi) (Chronic)     -Continue Januvia 100 mg  -Will apply for discount program  -Check A1c in 3 months         Colon cancer screening     -Cologuard ordered on 1/13/25         Relevant Orders    Cologuard® colon cancer screening    Melanocytic nevi of scalp and neck     -Follows up with dermatology and scheduled for excision         S/P CABG x 1 - Primary     -Follows up with cardiology, stable, follow-up in 6 months per cardio.          #Health Maintenance:  - Routine labs in May  - Cologuard ordered    Revisit Topics:  left chest paresthesia and MSK soreness    Dispo: Patient is scheduled to return to clinic in  may.    Pj Ivy, PGY-3  Internal Medicine     Disclaimer: Documentation completed with the information available at the time of input. The times in the chart may not be reflective of actual patient care times, interventions, or procedures. Documentation occurs after the physical care of the patient.

## 2025-01-29 LAB — NONINV COLON CA DNA+OCC BLD SCRN STL QL: NEGATIVE

## 2025-02-13 ENCOUNTER — APPOINTMENT (OUTPATIENT)
Dept: DERMATOLOGY | Facility: CLINIC | Age: 75
End: 2025-02-13
Payer: MEDICARE

## 2025-02-13 DIAGNOSIS — L57.8 DIFFUSE PHOTODAMAGE OF SKIN: ICD-10-CM

## 2025-02-13 DIAGNOSIS — L57.0 ACTINIC KERATOSIS: ICD-10-CM

## 2025-02-13 DIAGNOSIS — C44.519 BASAL CELL CARCINOMA (BCC) OF SKIN OF OTHER PART OF TORSO: Primary | ICD-10-CM

## 2025-02-13 DIAGNOSIS — L82.1 SEBORRHEIC KERATOSIS: ICD-10-CM

## 2025-02-13 DIAGNOSIS — L81.4 LENTIGO: ICD-10-CM

## 2025-02-13 DIAGNOSIS — L84 CORNS AND CALLOSITIES: ICD-10-CM

## 2025-02-13 ASSESSMENT — DERMATOLOGY PATIENT ASSESSMENT
WHERE ARE THESE NEW OR CHANGING LESIONS LOCATED: BOTTOM OF RIGHT FOOT
DO YOU USE A TANNING BED: NO
DO YOU USE SUNSCREEN: OCCASIONALLY
DO YOU HAVE ANY NEW OR CHANGING LESIONS: YES

## 2025-02-13 ASSESSMENT — DERMATOLOGY QUALITY OF LIFE (QOL) ASSESSMENT: ARE THERE EXCLUSIONS OR EXCEPTIONS FOR THE QUALITY OF LIFE ASSESSMENT: NO

## 2025-02-13 NOTE — PROGRESS NOTES
Subjective     Edward D Hume is a 74 y.o. male who presents for the following: Discuss recent biopsy results and treatment options.  Biopsy of 3 suspicious lesions performed at his last visit in our office on 12/12/24 revealed 2 actinic keratoses on his left postauricular neck and left dorsal mid forearm and a basal cell carcinoma on his right upper back.    Today, the patient states the biopsy sites healed well.  Since his last visit, he reports a scaly, rough bump on the bottom of his right foot has become more painful, especially when he walks and stands.  He denies any other new, changing, or concerning skin lesions since his last visit; no bleeding, itching, or burning lesions.      Review of Systems:  No other skin or systemic complaints other than what is documented elsewhere in the note.    The following portions of the chart were reviewed this encounter and updated as appropriate:       Skin Cancer History  Biopsy Date Type Location Status   12/12/24 BCC, Superficial Right Upper Back Treatment Complete  2/13/25     Specialty Problems          Dermatology Problems    Melanocytic nevi of scalp and neck    Postoperative scar       Past Dermatologic / Past Relevant Medical History:    - recent biopsy-proven BCC on right upper back diagnosed at last visit on 12/12/24 and pending definitive treatment  - history of AKs   - seborrheic dermatitis  - no h/o atypical nevi or melanoma    Family History:    No family history of melanoma or skin cancer    Social History:    The patient is retired from working as a  and designing CT scan machines; he enjoys grilling; he recent underwent minimally invasive CABG surgery    Allergies:  Amoxicillin, Metformin, and Iodinated contrast media    Current Medications / CAM's:    Current Outpatient Medications:     albuterol 90 mcg/actuation inhaler, Inhale 2 puffs every 6 hours if needed for shortness of breath., Disp: 18 g, Rfl: 2    allopurinol (Zyloprim) 300 mg  "tablet, Take 1 tablet (300 mg) by mouth once daily., Disp: 30 tablet, Rfl: 4    amLODIPine (Norvasc) 10 mg tablet, Take 1 tablet (10 mg) by mouth once daily., Disp: 30 tablet, Rfl: 4    aspirin 81 mg EC tablet, Take 1 tablet (81 mg) by mouth once daily., Disp: 30 tablet, Rfl: 4    atorvastatin (Lipitor) 80 mg tablet, Take 1 tablet (80 mg) by mouth once daily at bedtime., Disp: 90 tablet, Rfl: 3    biotin 10 mg tablet, Take 1 tablet (10 mg) by mouth once daily., Disp: 90 tablet, Rfl: 0    cranberry extract 200 mg capsule, Take 300 mg by mouth once daily., Disp: 90 capsule, Rfl: 0    famotidine (Pepcid) 20 mg tablet, Take 1 tablet (20 mg) by mouth once daily at bedtime., Disp: 30 tablet, Rfl: 4    gemfibrozil (Lopid) 600 mg tablet, Take 1 tablet (600 mg) by mouth 2 times a day., Disp: 60 tablet, Rfl: 2    glipiZIDE XL (Glucotrol XL) 10 mg 24 hr tablet, Take 2 tablets (20 mg) by mouth once daily., Disp: 60 tablet, Rfl: 2    insulin glargine (Toujeo SoloStar U-300 Insulin) 300 unit/mL (1.5 mL) injection, Inject 36 Units under the skin once daily at bedtime. Take as directed per insulin instructions., Disp: 10.8 mL, Rfl: 1    Januvia 100 mg tablet, Take 1 tablet (100 mg) by mouth once daily., Disp: 30 tablet, Rfl: 3    lidocaine 4 % cream, Apply topically 4 times a day as needed for mild pain (1 - 3)., Disp: 30 g, Rfl: 1    metoprolol tartrate (Lopressor) 25 mg tablet, Take 1 tablet (25 mg) by mouth 2 times a day., Disp: 180 tablet, Rfl: 3    omeprazole (PriLOSEC) 40 mg DR capsule, Take 1 capsule (40 mg) by mouth once daily in the morning. Take before meals., Disp: 30 capsule, Rfl: 4    pen needle, diabetic (BD Ultra-Fine Short Pen Needle) 31 gauge x 5/16\" needle, USE AS DIRECTED, Disp: 100 each, Rfl: 3    tamsulosin (Flomax) 0.4 mg 24 hr capsule, Take 1 capsule (0.4 mg) by mouth once daily., Disp: 180 capsule, Rfl: 0    valsartan-hydrochlorothiazide (Diovan-HCT) 160-12.5 mg tablet, Take 1 tablet by mouth once daily., " Disp: 30 tablet, Rfl: 4     Objective   Well appearing patient in no apparent distress; mood and affect are within normal limits.    A skin examination was performed including: Face, neck, and bilateral upper extremities and feet. All findings within normal limits unless otherwise noted below.    Assessment/Plan   1. Basal cell carcinoma (BCC) of skin of other part of torso  Right Upper Back  Pink, well-healed scar at his recent biopsy site    Destr of lesion    Destruction method: electrodesiccation and curettage    Informed consent: discussed and consent obtained    Timeout:  patient name, date of birth, surgical site, and procedure verified  Procedure prep:  Patient was prepped and draped  Anesthesia: the lesion was anesthetized in a standard fashion    Anesthetic:  1% lidocaine w/ epinephrine 1-100,000 local infiltration  Curettage performed in three different directions: Yes    Electrodesiccation performed over the curetted area: Yes    Curettage cycles:  3  Lesion length (cm):  1.7  Lesion width (cm):  1.7  Margin per side (cm):  0.7  Final wound size (cm):  3.1  Hemostasis achieved with:  electrodesiccation  Outcome: patient tolerated procedure well with no complications    Post-procedure details: sterile dressing applied and wound care instructions given    Dressing type: bandage and petrolatum      Staff Communication: Dermatology Local Anesthesia: 1 % Lidocaine / Epinephrine - Amount:1ml    Biopsy-proven basal cell carcinoma - right upper back; superficial growth pattern; diagnosed at last visit on 12/12/24 and pending definitive treatment.  The malignant nature of BCC, the need for further definitive treatment, and treatment options, including Mohs surgery, wide local excision, and Electrodesiccation & Curettage, were discussed extensively with the patient today.  After discussing the risks and benefits of each option, including the differences in cure rates and permanent scar results, the patient  expressed understanding and wishes to proceed with definitive treatment with ED&C today.  I will have the patient return to our office in 3 months for re-evaluation of the ED&C site as well as routine follow-up and skin exam, and the patient was instructed to call should they notice any new, changing, symptomatic, or concerning skin lesions before then.  The patient expressed understanding, is in agreement with this plan, and wishes to proceed with the ED&C today.    2. Actinic keratosis (20)  Neck - Posterior (20)  On the patient's left postauricular neck and left dorsal mid forearm, there are pink, well-healed scars at his recent biopsy sites each with a surrounding rim of erythema, grittiness, and scale; scattered on the patient's left and right lateral neck and bilateral dorsal forearms, there are multiple erythematous, gritty, scaly macules    Biopsy-proven Actinic Keratosis and additional AKs -left postauricular neck and left dorsal mid forearm, present on the deep and peripheral margins, and scattered on left and right lateral neck and bilateral dorsal forearms, respectively.  The pre-cancerous nature of these lesions and treatment options were discussed with the patient today.  At this time, I recommend treatment with liquid nitrogen cryotherapy.  The patient expressed understanding, is in agreement with this plan, and wishes to proceed with cryotherapy today.    Destr of lesion - Neck - Posterior (20)  Complexity: simple    Destruction method: cryotherapy    Informed consent: discussed and consent obtained    Lesion destroyed using liquid nitrogen: Yes    Cryotherapy cycles:  1  Outcome: patient tolerated procedure well with no complications    Post-procedure details: wound care instructions given      3. Corns and callosities  Right Foot - Posterior  On the plantar surface of his right lateral distal foot, there is a 5 mm pink to flesh-colored, hyperkeratotic papule with a central clearish, keratotic,  tender core    Porokeratosis plantaris discrete (corn) - right lateral distal foot.  The benign nature of this lesion was discussed with the patient today and reassurance provided.  I discussed several treatment options, including no treatment, the use of a doughnut pad or corn pad to alleviate pressure over the site, topical therapy, such as with salicylic acid, and destructive treatments, such as liquid nitrogen cryotherapy.  After discussing the risks and benefits of each of these options at length, the patient expressed understanding and wishes to undergo cryotherapy for this lesion in the office today.  Following treatment, he was instructed to wear a donut pad every day to minimize pressure over the site and hopefully reduce the likelihood of recurrence.  He expressed understanding, is in agreement with this plan, and wishes to proceed with cryotherapy.    Destr of lesion - Right Foot - Posterior  Complexity: simple    Destruction method: cryotherapy    Informed consent: discussed and consent obtained    Lesion destroyed using liquid nitrogen: Yes    Cryotherapy cycles:  3  Outcome: patient tolerated procedure well with no complications    Post-procedure details: wound care instructions given      4. Seborrheic keratosis  Scattered on the patient's face, neck, scalp, and bilateral upper extremities, there are multiple tan- to light brown-colored, hyperkeratotic, stuck-on appearing papules of varying size and shape    Seborrheic Keratoses - the benign nature of these lesions was discussed with the patient today and reassurance provided.  No treatment is medically indicated for these lesions at this time.    5. Lentigo  Photodistributed  Multiple tan- to light brown-colored, round- to oval-shaped, symmetric and uniform-appearing macules and small patches consistent with lentigines scattered in sun-exposed areas.    Solar Lentigines and photodamage.  The clinically benign-appearing nature of these lesions and  their relation to chronic sun exposure were discussed with the patient today and reassurance provided.  None of these lesions meet threshold for biopsy today, and thus no treatment is medically indicated for these lesions at this time.  The signs and symptoms of skin cancer were reviewed and the patient was advised to practice sun protection and sun avoidance, use daily sunscreen, and perform regular self skin exams.  The patient was instructed to monitor these lesions for any changes, such as in size, shape, or color, or associated symptoms and to call our office to schedule a return visit for re-evaluation if any such changes or symptoms are noticed in the future.  The patient expressed understanding and is in agreement with this plan.    6. Diffuse photodamage of skin  Photodistributed  Diffuse photodamage with actinic changes with telangiectasia and mottled pigmentation in sun-exposed areas.    History of basal cell carcinoma and actinic keratoses and photodamage.  The signs and symptoms of skin cancer were reviewed and the patient was advised to practice sun protection and sun avoidance, use daily sunscreen, and perform regular self skin exams.  I will have the patient return to our office in 4 to 6 months from the date of his last visit for routine follow-up and skin exam, and the patient was instructed to call our office should the patient notice any new, changing, symptomatic, or otherwise concerning skin lesions before then.  The patient expressed understanding and is in agreement with this plan.

## 2025-03-04 ENCOUNTER — APPOINTMENT (OUTPATIENT)
Dept: CARDIOLOGY | Facility: HOSPITAL | Age: 75
End: 2025-03-04
Payer: MEDICARE

## 2025-03-04 VITALS
HEART RATE: 73 BPM | HEIGHT: 73 IN | WEIGHT: 215 LBS | DIASTOLIC BLOOD PRESSURE: 70 MMHG | OXYGEN SATURATION: 99 % | SYSTOLIC BLOOD PRESSURE: 142 MMHG | BODY MASS INDEX: 28.49 KG/M2

## 2025-03-04 DIAGNOSIS — E78.2 MIXED HYPERLIPIDEMIA: Chronic | ICD-10-CM

## 2025-03-04 DIAGNOSIS — Z95.1 S/P CABG X 1: ICD-10-CM

## 2025-03-04 DIAGNOSIS — I10 PRIMARY HYPERTENSION: Primary | Chronic | ICD-10-CM

## 2025-03-04 LAB
ATRIAL RATE: 67 BPM
P AXIS: 64 DEGREES
P OFFSET: 166 MS
P ONSET: 111 MS
PR INTERVAL: 194 MS
Q ONSET: 208 MS
QRS COUNT: 11 BEATS
QRS DURATION: 102 MS
QT INTERVAL: 424 MS
QTC CALCULATION(BAZETT): 448 MS
QTC FREDERICIA: 440 MS
R AXIS: 28 DEGREES
T AXIS: 60 DEGREES
T OFFSET: 420 MS
VENTRICULAR RATE: 67 BPM

## 2025-03-04 PROCEDURE — 1160F RVW MEDS BY RX/DR IN RCRD: CPT | Performed by: NURSE PRACTITIONER

## 2025-03-04 PROCEDURE — 99214 OFFICE O/P EST MOD 30 MIN: CPT | Performed by: NURSE PRACTITIONER

## 2025-03-04 PROCEDURE — 1159F MED LIST DOCD IN RCRD: CPT | Performed by: NURSE PRACTITIONER

## 2025-03-04 PROCEDURE — 1036F TOBACCO NON-USER: CPT | Performed by: NURSE PRACTITIONER

## 2025-03-04 PROCEDURE — 3078F DIAST BP <80 MM HG: CPT | Performed by: NURSE PRACTITIONER

## 2025-03-04 PROCEDURE — 3077F SYST BP >= 140 MM HG: CPT | Performed by: NURSE PRACTITIONER

## 2025-03-04 PROCEDURE — 93010 ELECTROCARDIOGRAM REPORT: CPT | Performed by: INTERNAL MEDICINE

## 2025-03-04 PROCEDURE — 93005 ELECTROCARDIOGRAM TRACING: CPT | Performed by: NURSE PRACTITIONER

## 2025-03-04 PROCEDURE — 3008F BODY MASS INDEX DOCD: CPT | Performed by: NURSE PRACTITIONER

## 2025-03-04 ASSESSMENT — ENCOUNTER SYMPTOMS: HYPERTENSION: 1

## 2025-03-04 NOTE — PROGRESS NOTES
Subjective   Edward D Hume is a 74 y.o. male.    Chief Complaint:  Hypertension, Hyperlipidemia, and Coronary Artery Disease    Mr. Hume returns for a routine follow up. He is seen in collaboration with Dr. Anthony. He has been feeling well from a cardiac standpoint. He has remained compliant with his medications, denying any intolerances. He remains active walking, denying any exertional chest pain or shortness of breath. He has some knee discomfort that limits him, though he is not interested in having surgery. His only other complaint today is swelling at his left elbow. He denies any recent ER visits or hospitalizations. He offers no specific cardiovascular complaints or concerns today. He denies any complaints of chest pain, shortness of breath, lightheadedness, dizziness, palpitations, syncope, orthopnea, paroxysmal nocturnal dyspnea, lower extremity swelling or bleeding concerns.      Hypertension    Hyperlipidemia    Coronary Artery Disease  Risk factors include hyperlipidemia.       Review of Systems   All other systems reviewed and are negative.      Objective   Physical Exam  Constitutional:       Appearance: Healthy appearance. In no distress  Pulmonary:      Effort: Pulmonary effort is normal.      Breath sounds: Normal breath sounds.   Cardiovascular:      Normal rate. Regular rhythm. Normal S1. Normal S2.       Murmurs: There is no murmur.      Carotids: right carotid pulse +2, no bruit heard over the right carotid. left carotid pulse +2, no bruit heard over the left carotid.  Edema:     Peripheral edema absent.   Abdominal:      Palpations: Abdomen is soft.   Musculoskeletal:       Cervical back: Normal range of motion.   Skin:     General: Skin is warm and dry. Normal color and pigmentation   Neurological:      Mental Status: Alert and oriented to person, place and time.   Psychiatric:     Mood and Affect: appropriate mood and appropriate affect.     EKG obtained and reviewed. Normal sinus  rhythm. HR 67    Lab Review:   Lab Results   Component Value Date     09/24/2024    K 4.3 09/24/2024     09/24/2024    CO2 27 09/24/2024    BUN 24 (H) 09/24/2024    CREATININE 1.11 09/24/2024    GLUCOSE 147 (H) 09/24/2024    CALCIUM 9.3 09/24/2024     Lab Results   Component Value Date    WBC 6.1 09/24/2024    HGB 13.6 09/24/2024    HCT 39.9 (L) 09/24/2024    MCV 92 09/24/2024     09/24/2024     Lab Results   Component Value Date    CHOL 120 09/24/2024    TRIG 258 (H) 02/23/2024    HDL 29.3 09/24/2024    LDLDIRECT 62 09/24/2024       Assessment/Plan   Mr. Hume is a 74 year old  male with a past medical history significant for hypertension, hyperlipidemia, CKD, T2DM, GERD and elevated CACS of 1736.09 (6/2022). Heart catheterization 5/2024 showed severe 2-vessel disease with normal LV systolic function, now s/p CABG (MIDCAB) with Dr. Bhumi Price with LIMA-LAD 5/2/24. He presents today for routine follow up stable from a cardiac standpoint. His VS and EKG remain stable. He remains on appropriate antiplatelet and lipid lowering therapy with his LDL at goal. He complains of soreness when he presses on his chest, though denies any exertional chest pain or shortness of breath. I will have him continue all medications unchanged. He will follow up with his PCP regarding his elbow. He will follow up with us in clinic in 6 months. He knows to call for any concerns.

## 2025-03-06 DIAGNOSIS — E11.22 TYPE 2 DIABETES MELLITUS WITH STAGE 3A CHRONIC KIDNEY DISEASE, WITH LONG-TERM CURRENT USE OF INSULIN (MULTI): Chronic | ICD-10-CM

## 2025-03-06 DIAGNOSIS — N18.31 TYPE 2 DIABETES MELLITUS WITH STAGE 3A CHRONIC KIDNEY DISEASE, WITH LONG-TERM CURRENT USE OF INSULIN (MULTI): Chronic | ICD-10-CM

## 2025-03-06 DIAGNOSIS — Z79.4 TYPE 2 DIABETES MELLITUS WITH STAGE 3A CHRONIC KIDNEY DISEASE, WITH LONG-TERM CURRENT USE OF INSULIN (MULTI): Chronic | ICD-10-CM

## 2025-03-07 ENCOUNTER — OFFICE VISIT (OUTPATIENT)
Dept: ORTHOPEDIC SURGERY | Facility: HOSPITAL | Age: 75
End: 2025-03-07
Payer: MEDICARE

## 2025-03-07 ENCOUNTER — OFFICE VISIT (OUTPATIENT)
Dept: PRIMARY CARE | Facility: CLINIC | Age: 75
End: 2025-03-07
Payer: MEDICARE

## 2025-03-07 VITALS
DIASTOLIC BLOOD PRESSURE: 81 MMHG | BODY MASS INDEX: 28.1 KG/M2 | HEIGHT: 73 IN | WEIGHT: 212 LBS | HEART RATE: 71 BPM | SYSTOLIC BLOOD PRESSURE: 157 MMHG

## 2025-03-07 DIAGNOSIS — I10 HYPERTENSION, UNSPECIFIED TYPE: ICD-10-CM

## 2025-03-07 DIAGNOSIS — E11.9 TYPE 2 DIABETES MELLITUS WITHOUT COMPLICATIONS (MULTI): ICD-10-CM

## 2025-03-07 DIAGNOSIS — Z95.1 S/P CABG X 1: ICD-10-CM

## 2025-03-07 DIAGNOSIS — M70.22 OLECRANON BURSITIS OF LEFT ELBOW: Primary | ICD-10-CM

## 2025-03-07 DIAGNOSIS — N40.0 BENIGN PROSTATIC HYPERPLASIA, UNSPECIFIED WHETHER LOWER URINARY TRACT SYMPTOMS PRESENT: ICD-10-CM

## 2025-03-07 DIAGNOSIS — E11.9 TYPE 2 DIABETES MELLITUS WITHOUT COMPLICATION, WITH LONG-TERM CURRENT USE OF INSULIN (MULTI): ICD-10-CM

## 2025-03-07 DIAGNOSIS — R06.02 SHORTNESS OF BREATH: ICD-10-CM

## 2025-03-07 DIAGNOSIS — K21.9 GASTROESOPHAGEAL REFLUX DISEASE WITHOUT ESOPHAGITIS: Chronic | ICD-10-CM

## 2025-03-07 DIAGNOSIS — Z98.890 POSTOPERATIVE SCAR: ICD-10-CM

## 2025-03-07 DIAGNOSIS — M10.9 GOUT, UNSPECIFIED CAUSE, UNSPECIFIED CHRONICITY, UNSPECIFIED SITE: ICD-10-CM

## 2025-03-07 DIAGNOSIS — M70.22 OLECRANON BURSITIS OF LEFT ELBOW: ICD-10-CM

## 2025-03-07 DIAGNOSIS — Z79.4 TYPE 2 DIABETES MELLITUS WITHOUT COMPLICATION, WITH LONG-TERM CURRENT USE OF INSULIN (MULTI): ICD-10-CM

## 2025-03-07 DIAGNOSIS — L90.5 POSTOPERATIVE SCAR: ICD-10-CM

## 2025-03-07 PROCEDURE — 99213 OFFICE O/P EST LOW 20 MIN: CPT | Performed by: INTERNAL MEDICINE

## 2025-03-07 PROCEDURE — 3079F DIAST BP 80-89 MM HG: CPT | Performed by: INTERNAL MEDICINE

## 2025-03-07 PROCEDURE — 1036F TOBACCO NON-USER: CPT | Performed by: INTERNAL MEDICINE

## 2025-03-07 PROCEDURE — 3008F BODY MASS INDEX DOCD: CPT | Performed by: INTERNAL MEDICINE

## 2025-03-07 PROCEDURE — 3077F SYST BP >= 140 MM HG: CPT | Performed by: INTERNAL MEDICINE

## 2025-03-07 PROCEDURE — G2211 COMPLEX E/M VISIT ADD ON: HCPCS | Performed by: INTERNAL MEDICINE

## 2025-03-07 PROCEDURE — 99213 OFFICE O/P EST LOW 20 MIN: CPT | Performed by: ORTHOPAEDIC SURGERY

## 2025-03-07 RX ORDER — VALSARTAN AND HYDROCHLOROTHIAZIDE 160; 12.5 MG/1; MG/1
1 TABLET, FILM COATED ORAL DAILY
Qty: 30 TABLET | Refills: 4 | Status: SHIPPED | OUTPATIENT
Start: 2025-03-07

## 2025-03-07 RX ORDER — AMLODIPINE BESYLATE 10 MG/1
10 TABLET ORAL DAILY
Qty: 30 TABLET | Refills: 4 | Status: SHIPPED | OUTPATIENT
Start: 2025-03-07

## 2025-03-07 RX ORDER — METOPROLOL TARTRATE 25 MG/1
25 TABLET, FILM COATED ORAL 2 TIMES DAILY
Qty: 180 TABLET | Refills: 3 | Status: SHIPPED | OUTPATIENT
Start: 2025-03-07 | End: 2026-03-07

## 2025-03-07 RX ORDER — TAMSULOSIN HYDROCHLORIDE 0.4 MG/1
0.4 CAPSULE ORAL DAILY
Qty: 180 CAPSULE | Refills: 0 | Status: SHIPPED | OUTPATIENT
Start: 2025-03-07

## 2025-03-07 RX ORDER — ATORVASTATIN CALCIUM 80 MG/1
80 TABLET, FILM COATED ORAL NIGHTLY
Qty: 90 TABLET | Refills: 3 | Status: SHIPPED | OUTPATIENT
Start: 2025-03-07 | End: 2026-03-07

## 2025-03-07 RX ORDER — ALBUTEROL SULFATE 90 UG/1
2 INHALANT RESPIRATORY (INHALATION) EVERY 6 HOURS PRN
Qty: 18 G | Refills: 2 | Status: SHIPPED | OUTPATIENT
Start: 2025-03-07

## 2025-03-07 RX ORDER — GINSENG 100 MG
300 CAPSULE ORAL DAILY
Qty: 90 CAPSULE | Refills: 0 | Status: SHIPPED | OUTPATIENT
Start: 2025-03-07

## 2025-03-07 RX ORDER — BIOTIN 10 MG
10 TABLET ORAL DAILY
Qty: 90 TABLET | Refills: 0 | Status: SHIPPED | OUTPATIENT
Start: 2025-03-07 | End: 2025-06-05

## 2025-03-07 RX ORDER — ASPIRIN 81 MG/1
81 TABLET ORAL DAILY
Qty: 30 TABLET | Refills: 4 | Status: SHIPPED | OUTPATIENT
Start: 2025-03-07

## 2025-03-07 RX ORDER — GEMFIBROZIL 600 MG/1
600 TABLET, FILM COATED ORAL 2 TIMES DAILY
Qty: 60 TABLET | Refills: 2 | Status: SHIPPED | OUTPATIENT
Start: 2025-03-07

## 2025-03-07 RX ORDER — LIDOCAINE 40 MG/G
CREAM TOPICAL 4 TIMES DAILY PRN
Qty: 30 G | Refills: 1 | Status: SHIPPED | OUTPATIENT
Start: 2025-03-07 | End: 2026-03-07

## 2025-03-07 RX ORDER — GLIPIZIDE 10 MG/1
20 TABLET, FILM COATED, EXTENDED RELEASE ORAL DAILY
Qty: 60 TABLET | Refills: 2 | Status: SHIPPED | OUTPATIENT
Start: 2025-03-07

## 2025-03-07 RX ORDER — SITAGLIPTIN 100 MG/1
100 TABLET, FILM COATED ORAL DAILY
Qty: 30 TABLET | Refills: 3 | Status: SHIPPED | OUTPATIENT
Start: 2025-03-07

## 2025-03-07 RX ORDER — GINSENG 100 MG
300 CAPSULE ORAL DAILY
Qty: 90 CAPSULE | Refills: 0 | Status: SHIPPED | OUTPATIENT
Start: 2025-03-07 | End: 2025-03-07

## 2025-03-07 RX ORDER — OMEPRAZOLE 40 MG/1
40 CAPSULE, DELAYED RELEASE ORAL
Qty: 30 CAPSULE | Refills: 4 | Status: SHIPPED | OUTPATIENT
Start: 2025-03-07

## 2025-03-07 RX ORDER — FAMOTIDINE 20 MG/1
20 TABLET, FILM COATED ORAL NIGHTLY
Qty: 30 TABLET | Refills: 4 | Status: SHIPPED | OUTPATIENT
Start: 2025-03-07

## 2025-03-07 RX ORDER — ALLOPURINOL 300 MG/1
300 TABLET ORAL DAILY
Qty: 30 TABLET | Refills: 4 | Status: SHIPPED | OUTPATIENT
Start: 2025-03-07

## 2025-03-07 RX ORDER — INSULIN GLARGINE 300 [IU]/ML
36 INJECTION, SOLUTION SUBCUTANEOUS NIGHTLY
Qty: 10.8 ML | Refills: 1 | Status: SHIPPED | OUTPATIENT
Start: 2025-03-07 | End: 2025-09-03

## 2025-03-07 ASSESSMENT — LIFESTYLE VARIABLES
HOW OFTEN DO YOU HAVE SIX OR MORE DRINKS ON ONE OCCASION: NEVER
AUDIT-C TOTAL SCORE: 1
SKIP TO QUESTIONS 9-10: 1
HOW MANY STANDARD DRINKS CONTAINING ALCOHOL DO YOU HAVE ON A TYPICAL DAY: 1 OR 2
HOW OFTEN DO YOU HAVE A DRINK CONTAINING ALCOHOL: MONTHLY OR LESS

## 2025-03-07 ASSESSMENT — PATIENT HEALTH QUESTIONNAIRE - PHQ9
1. LITTLE INTEREST OR PLEASURE IN DOING THINGS: NOT AT ALL
2. FEELING DOWN, DEPRESSED OR HOPELESS: NOT AT ALL
SUM OF ALL RESPONSES TO PHQ9 QUESTIONS 1 AND 2: 0

## 2025-03-07 ASSESSMENT — ENCOUNTER SYMPTOMS
FATIGUE: 0
ABDOMINAL PAIN: 0
DIFFICULTY URINATING: 0
FEVER: 0
SHORTNESS OF BREATH: 0

## 2025-03-07 NOTE — PROGRESS NOTES
CHIEF COMPLAINT         Left elbow swelling    ASSESSMENT + PLAN    Sterile left olecranon bursitis    I reviewed the nature of olecranon bursitis and my rationale for not aspirating these, due to inevitable rapid recurrence and the small risk of causing an infection.  I recommend using anti-inflammatories as needed for any discomfort and advancing activity as pain allows.  Avoid leaning on the point of the elbow.  Use the Ace bandage as demonstrated to provide compression and speed resolution.  Consider placing some padding material under the Ace.  Alternatively, purchased elbow pad will work.    Follow-up with any concerns.        HISTORY OF PRESENT ILLNESS       Patient is a 74 y.o. right-hand dominant male retiree, who presents today for evaluation of swelling in the posterior left elbow.  This was noticed about 5 weeks ago.  No single specific recalled trauma.  He simply woke up with it.  It is bothersome when he leans on the elbow due to the odd feeling, not pain.  No fevers or chills.  No difficulty with motion.  No popping, catching, or instability.  No similar episodes in the past on either side.    He is diabetic but not hypothyroid.  He does not smoke.      REVIEW OF SYSTEMS       A 30-item multi-system Review Of Systems was obtained on today's intake form.  This was reviewed with the patient and is correct.  The pertinent positives and negatives are listed above.  The form has been scanned separately into the medical record.      PHYSICAL EXAM    Constitutional:    Appears stated age. Well-developed and well-nourished male in no acute distress.  Psychiatric:         Pleasant normal mood and affect. Behavior is appropriate for the situation.   Head:                   Normocephalic and atraumatic.  Eyes:                    Pupils are equal and round.  Cardiovascular:  2+ radial and ulnar pulses. Fingers well-perfused.  Respiratory:        Effort normal. No respiratory distress. Speaking in complete  sentences.  Neurologic:       Alert and oriented to person, place, and time.  Skin:                Skin is intact, warm and dry.  Hematologic / Lymphatic:    No lymphedema or lymphangitis.    Extremities / Musculoskeletal:                      Skin of the left elbow and forearm is intact with no erythema, ecchymosis, or diffuse swelling.  Normal skin drag and coloration.  Full composite finger flexion extension with good intrinsic plus minus posture.  Symmetric wrist, forearm, elbow range of motion.  No elbow joint effusion.  Stable collateral exam.  Moderate soft swelling of the olecranon bursa with palpable synovitis nodule within.  No tenderness or increased temperature or erythema to suggest infection.  Intact biceps and triceps with 5/5 strength.  Sensation intact to light touch in all distributions.  Capillary refill less than 2 seconds.      IMAGING / LABS / EMGs           None pertinent      Past Medical History:   Diagnosis Date    Acute nonparalytic poliomyelitis ([facility])     Acute nonparalytic polio    Awareness under anesthesia     Body mass index (BMI) 25.0-25.9, adult 10/26/2019    Body mass index (BMI) of 25.0 to 25.9 in adult    Body mass index (BMI) 27.0-27.9, adult 09/22/2021    Body mass index (BMI) of 27.0 to 27.9 in adult    Body mass index (BMI) 29.0-29.9, adult 08/31/2019    Body mass index (BMI) of 29.0 to 29.9 in adult    CKD (chronic kidney disease)     Crushing injury of unspecified finger(s), initial encounter 06/12/2014    Injury, crush, finger    Diabetes mellitus (Multi)     Disorder of the skin and subcutaneous tissue, unspecified 09/04/2019    Skin lesion    Disorder of the skin and subcutaneous tissue, unspecified 03/30/2016    Skin lesion    Elevated prostate specific antigen (PSA)     Elevated prostate specific antigen (PSA)    GERD (gastroesophageal reflux disease)     Gout     History of falling 06/27/2016    History of fall    HLD (hyperlipidemia)     HTN (hypertension)      Other chest pain 03/07/2018    Chest pressure    Other conditions influencing health status 02/22/2017    Type 2 diabetes mellitus, uncontrolled    Other conditions influencing health status 03/04/2016    Epicondylitis    Other conditions influencing health status     Nephrolithiasis    Personal history of other mental and behavioral disorders 11/06/2018    History of depression    Personal history of other specified conditions 04/16/2019    History of dizziness    Personal history of other specified conditions 04/16/2019    History of weight loss    Personal history of other specified conditions 06/29/2016    History of abdominal pain    Puncture wound without foreign body of left hand, initial encounter 03/05/2019    Puncture wound of hand, left    Puncture wound without foreign body of unspecified hand, initial encounter 03/05/2019    Puncture wound, hand    Superficial mycosis, unspecified 12/01/2014    Fungal otitis externa    Trigger finger, left middle finger 07/24/2017    Trigger middle finger of left hand    Trigger finger, left middle finger 07/24/2017    Trigger middle finger of left hand    Trigger finger, left ring finger 07/24/2017    Trigger ring finger of left hand    Trigger finger, right index finger 07/24/2017    Trigger index finger of right hand    Trigger finger, right middle finger 11/10/2017    Trigger middle finger of right hand       Medication Documentation Review Audit       Reviewed by Xochitl Jennings RN (Registered Nurse) on 03/05/25 at 1144      Medication Order Taking? Sig Documenting Provider Last Dose Status   albuterol 90 mcg/actuation inhaler [id number] Yes Inhale 2 puffs every 6 hours if needed for shortness of breath. Pj Ivy, DO  Active   allopurinol (Zyloprim) 300 mg tablet [id number] Yes Take 1 tablet (300 mg) by mouth once daily. Pj Ivy, DO  Active   amLODIPine (Norvasc) 10 mg tablet [id number] Yes Take 1 tablet (10 mg) by mouth once daily. Pj Ivy, DO   Active   aspirin 81 mg EC tablet [id number] Yes Take 1 tablet (81 mg) by mouth once daily. Pj Ivy DO  Active   atorvastatin (Lipitor) 80 mg tablet [id number] Yes Take 1 tablet (80 mg) by mouth once daily at bedtime. Pj Ivy DO  Active   biotin 10 mg tablet [id number] Yes Take 1 tablet (10 mg) by mouth once daily. Pj Ivy DO  Active   cranberry extract 200 mg capsule [id number] Yes Take 300 mg by mouth once daily. Pj Ivy DO  Active   famotidine (Pepcid) 20 mg tablet [id number] Yes Take 1 tablet (20 mg) by mouth once daily at bedtime. Pj Ivy DO  Active   gemfibrozil (Lopid) 600 mg tablet [id number] Yes Take 1 tablet (600 mg) by mouth 2 times a day. Pj Ivy DO  Active   glipiZIDE XL (Glucotrol XL) 10 mg 24 hr tablet [id number] Yes Take 2 tablets (20 mg) by mouth once daily. Pj Ivy DO  Active   insulin glargine (Toujeo SoloStar U-300 Insulin) 300 unit/mL (1.5 mL) injection [id number] Yes Inject 36 Units under the skin once daily at bedtime. Take as directed per insulin instructions. Pj Ivy DO  Active   Januvia 100 mg tablet [id number] Yes Take 1 tablet (100 mg) by mouth once daily. Pj Ivy DO  Active   lidocaine 4 % cream [id number] Yes Apply topically 4 times a day as needed for mild pain (1 - 3). Pj Ivy DO  Active   metoprolol tartrate (Lopressor) 25 mg tablet [id number] Yes Take 1 tablet (25 mg) by mouth 2 times a day. Pj Ivy DO  Active   omeprazole (PriLOSEC) 40 mg DR capsule [id number] Yes Take 1 capsule (40 mg) by mouth once daily in the morning. Take before meals. Pj Ivy DO  Active    Discontinued 03/04/25 1007   tamsulosin (Flomax) 0.4 mg 24 hr capsule [id number] Yes Take 1 capsule (0.4 mg) by mouth once daily. Pj Ivy DO  Active   valsartan-hydrochlorothiazide (Diovan-HCT) 160-12.5 mg tablet [id number] Yes Take 1 tablet by mouth once daily. Pj Ivy, DO  Active                    Allergies    Allergen Reactions    Amoxicillin Shortness of breath     Tolerated cefazolin May 2024    Metformin Swelling and Angioedema    Iodinated Contrast Media Palpitations       Social History     Socioeconomic History    Marital status:      Spouse name: Not on file    Number of children: Not on file    Years of education: Not on file    Highest education level: Not on file   Occupational History    Not on file   Tobacco Use    Smoking status: Former     Types: Cigarettes     Passive exposure: Past    Smokeless tobacco: Never   Vaping Use    Vaping status: Never Used   Substance and Sexual Activity    Alcohol use: Yes     Comment: Occasionally    Drug use: Never    Sexual activity: Defer   Other Topics Concern    Not on file   Social History Narrative    Not on file     Social Drivers of Health     Financial Resource Strain: Low Risk  (5/4/2024)    Overall Financial Resource Strain (CARDIA)     Difficulty of Paying Living Expenses: Not hard at all   Food Insecurity: Not on file   Transportation Needs: No Transportation Needs (5/21/2024)    OASIS : Transportation     Lack of Transportation (Medical): No     Lack of Transportation (Non-Medical): No     Patient Unable or Declines to Respond: No   Physical Activity: Not on file   Stress: Not on file   Social Connections: Feeling Socially Integrated (5/21/2024)    OASIS : Social Isolation     Frequency of experiencing loneliness or isolation: Never   Intimate Partner Violence: Not on file   Housing Stability: Low Risk  (5/4/2024)    Housing Stability Vital Sign     Unable to Pay for Housing in the Last Year: No     Number of Places Lived in the Last Year: 1     Unstable Housing in the Last Year: No       Past Surgical History:   Procedure Laterality Date    CARDIAC CATHETERIZATION      CARDIAC CATHETERIZATION N/A 05/01/2024    Procedure: Left Heart Cath with Coronary Angiography and LV;  Surgeon: Jerrod Anthony MD;  Location: [facility] Cardiac Cath Lab;   Service: Cardiovascular;  Laterality: N/A;  LEFT HEART CATH WITH POSSIBLE PCI WED MAY 1, 2024 AT 11:00 AM PT WILL ARRIVE AT 9:30 AM @ [facility]    CORONARY ARTERY BYPASS GRAFT      HAND TENDON SURGERY  02/15/2013    Hand Incision Tendon Sheath Of A Finger    KNEE ARTHROSCOPY W/ DEBRIDEMENT      OTHER SURGICAL HISTORY  04/09/2013    Needle Biopsy Of Prostate    OTHER SURGICAL HISTORY  07/02/2020    Retinal detachment repair    OTHER SURGICAL HISTORY  12/06/2017    Upper Gastrointestinal Endoscopy, Simple Primary Exam    OTHER SURGICAL HISTORY  06/12/2013    Repair Of Retinal Detachment Procedure Detail    OTHER SURGICAL HISTORY  06/12/2013    Surg Rad Resect Tonsil/Tonsil Pillars/Retromol Tri W/O Closure    SHOULDER SURGERY  06/12/2013    Shoulder Surgery         Electronically Signed      JAVAN Wolfe MD      Orthopaedic Hand Surgery      [phone]

## 2025-03-07 NOTE — PROGRESS NOTES
"Subjective   Patient ID: Edward D Hume is a 74 y.o. male who presents for Follow-up.    HPI   Patient presents with complaints of left elbow swelling which has worsened in the past month. He uses the computer constantly for work and rests his elbows on the seat rest. Swelling is soft and fluctuant, no redness or fevers.    Review of Systems   Constitutional:  Negative for fatigue and fever.   Respiratory:  Negative for shortness of breath.    Cardiovascular:  Negative for chest pain.   Gastrointestinal:  Negative for abdominal pain.   Genitourinary:  Negative for difficulty urinating.       Objective   /81 (BP Location: Right arm, Patient Position: Sitting, BP Cuff Size: Adult)   Pulse 71   Ht 1.854 m (6' 1\")   Wt 96.2 kg (212 lb)   BMI 27.97 kg/m²     Physical Exam  Vitals and nursing note reviewed.   HENT:      Head: Normocephalic.      Nose: Nose normal.      Mouth/Throat:      Mouth: Mucous membranes are moist.   Eyes:      Pupils: Pupils are equal, round, and reactive to light.   Cardiovascular:      Rate and Rhythm: Normal rate and regular rhythm.      Pulses: Normal pulses.      Heart sounds: Normal heart sounds.   Pulmonary:      Effort: Pulmonary effort is normal.   Abdominal:      General: Bowel sounds are normal.      Palpations: Abdomen is soft.   Skin:     General: Skin is warm and dry.   Neurological:      General: No focal deficit present.      Mental Status: He is alert.       Assessment/Plan   Diagnoses and all orders for this visit:  Olecranon bursitis of left elbow  -     Referral to Orthopaedic Surgery; Future  Shortness of breath  -     albuterol 90 mcg/actuation inhaler; Inhale 2 puffs every 6 hours if needed for shortness of breath.  Gout, unspecified cause, unspecified chronicity, unspecified site  -     allopurinol (Zyloprim) 300 mg tablet; Take 1 tablet (300 mg) by mouth once daily.  Hypertension, unspecified type  -     amLODIPine (Norvasc) 10 mg tablet; Take 1 tablet (10 mg) by " mouth once daily.  -     aspirin 81 mg EC tablet; Take 1 tablet (81 mg) by mouth once daily.  -     gemfibrozil (Lopid) 600 mg tablet; Take 1 tablet (600 mg) by mouth 2 times a day.  -     valsartan-hydrochlorothiazide (Diovan-HCT) 160-12.5 mg tablet; Take 1 tablet by mouth once daily.  S/P CABG x 1  -     atorvastatin (Lipitor) 80 mg tablet; Take 1 tablet (80 mg) by mouth once daily at bedtime.  -     metoprolol tartrate (Lopressor) 25 mg tablet; Take 1 tablet (25 mg) by mouth 2 times a day.  Type 2 diabetes mellitus without complications (Multi)  -     biotin 10 mg tablet; Take 1 tablet (10 mg) by mouth once daily.  -     cranberry extract 200 mg capsule; Take 300 mg by mouth once daily.  Gastroesophageal reflux disease without esophagitis  -     famotidine (Pepcid) 20 mg tablet; Take 1 tablet (20 mg) by mouth once daily at bedtime.  -     omeprazole (PriLOSEC) 40 mg DR capsule; Take 1 capsule (40 mg) by mouth once daily in the morning. Take before meals.  Type 2 diabetes mellitus without complication, with long-term current use of insulin (Multi)  -     glipiZIDE XL (Glucotrol XL) 10 mg 24 hr tablet; Take 2 tablets (20 mg) by mouth once daily.  -     insulin glargine (Toujeo SoloStar U-300 Insulin) 300 unit/mL (1.5 mL) pen; Inject 36 Units under the skin once daily at bedtime. Take as directed per insulin instructions.  -     Januvia 100 mg tablet; Take 1 tablet (100 mg) by mouth once daily.  Postoperative scar  -     lidocaine 4 % cream; Apply topically 4 times a day as needed for mild pain (1 - 3).  Benign prostatic hyperplasia, unspecified whether lower urinary tract symptoms present  -     tamsulosin (Flomax) 0.4 mg 24 hr capsule; Take 1 capsule (0.4 mg) by mouth once daily.    74 y.o. male with a PMH of HTN, HLD, OA, DM type 2, GERD, and Gout presents with complaints of left elbow swelling which has worsened in the past month. He uses the computer constantly for work and rests his elbows on the seat rest.  Swelling is large, soft and fluctuant, no redness or fevers. He does have pain when pressure is applied. Scheduled appointment with Orthopedic surgery for drainage. Recommended rest, ice application, compression and avoidance of further injury to elbow. He also wanted refills of his medications.    #Olecranon bursitis  - Large, soft fluctuant swelling of left elbow. No signs of infection  - Orthopedic surgery referral for drainage  - Rest, ice application, compression and avoidance of further trauma to elbow    Medications refilled    Kirk Weaver MD  Internal Medicine, PGY-II

## 2025-03-07 NOTE — Clinical Note
March 10, 2025     Roselia Ibrahim MD  [address]  [facility] [address]  [facility] [address]    Patient: Edward D Hume   YOB: 1950   Date of Visit: 3/7/2025       Dear Dr. Roselia Ibrahim MD:    Thank you for referring Edward Hume to me for evaluation. Below are my notes for this consultation.  If you have questions, please do not hesitate to call me. I look forward to following your patient along with you.       Sincerely,     Subhash Wolfe MD      CC: No Recipients  ______________________________________________________________________________________

## 2025-03-11 ENCOUNTER — TELEPHONE (OUTPATIENT)
Dept: PRIMARY CARE | Facility: CLINIC | Age: 75
End: 2025-03-11
Payer: MEDICARE

## 2025-03-11 DIAGNOSIS — Z79.4 TYPE 2 DIABETES MELLITUS WITHOUT COMPLICATION, WITH LONG-TERM CURRENT USE OF INSULIN (MULTI): ICD-10-CM

## 2025-03-11 DIAGNOSIS — E11.9 TYPE 2 DIABETES MELLITUS WITHOUT COMPLICATION, WITH LONG-TERM CURRENT USE OF INSULIN (MULTI): ICD-10-CM

## 2025-03-11 NOTE — TELEPHONE ENCOUNTER
Script ws sent to pharmacy for cranberry extract 200 mg caps.   With directions of take 300 mg daily.     Phamacy needs clarification.

## 2025-03-14 ENCOUNTER — APPOINTMENT (OUTPATIENT)
Dept: PHARMACY | Facility: HOSPITAL | Age: 75
End: 2025-03-14
Payer: MEDICARE

## 2025-03-14 DIAGNOSIS — E11.22 TYPE 2 DIABETES MELLITUS WITH STAGE 3A CHRONIC KIDNEY DISEASE, WITH LONG-TERM CURRENT USE OF INSULIN (MULTI): Chronic | ICD-10-CM

## 2025-03-14 DIAGNOSIS — N18.31 TYPE 2 DIABETES MELLITUS WITH STAGE 3A CHRONIC KIDNEY DISEASE, WITH LONG-TERM CURRENT USE OF INSULIN (MULTI): Chronic | ICD-10-CM

## 2025-03-14 DIAGNOSIS — Z79.4 TYPE 2 DIABETES MELLITUS WITH STAGE 3A CHRONIC KIDNEY DISEASE, WITH LONG-TERM CURRENT USE OF INSULIN (MULTI): Chronic | ICD-10-CM

## 2025-03-14 NOTE — PROGRESS NOTES
Clinical Pharmacy Appointment    Patient ID: Edward D Hume is a 74 y.o. male who presents for Diabetes.    Pt is here for First appointment.     Referring Provider: Roselia Ibrahim, *  PCP: Roselia Ibrahim MD   Last visit with PCP: 3/7/25   Next visit with PCP: 5/5/25    HISTORY OF PRESENT ILLNESS    Patient presents with controlled diabetes with cost concerns for his medications.  His Toujeo insulin is no longer covered under his insurance plan.  He is very frustrated and upset because of this.     LAB REVIEW   Glucose (mg/dL)   Date Value   09/24/2024 147 (H)   05/06/2024 105 (H)   05/05/2024 186 (H)     POC HEMOGLOBIN A1c (%)   Date Value   06/03/2024 6.1   02/12/2024 6.0   06/20/2023 5.8     Hemoglobin A1C (%)   Date Value   09/24/2024 6.3 (H)   02/23/2024 5.7 (H)   02/23/2024 5.8 (H)     Bicarbonate (mmol/L)   Date Value   09/24/2024 27   05/06/2024 21   05/05/2024 24     Urea Nitrogen (mg/dL)   Date Value   09/24/2024 24 (H)   05/06/2024 31 (H)   05/05/2024 33 (H)     Creatinine (mg/dL)   Date Value   09/24/2024 1.11   05/06/2024 1.21   05/05/2024 1.37 (H)     Lab Results   Component Value Date    HGBA1C 6.3 (H) 09/24/2024    HGBA1C 6.1 06/03/2024    HGBA1C 5.7 (H) 02/23/2024    HGBA1C 5.8 (H) 02/23/2024     Lab Results   Component Value Date    CHOL 120 09/24/2024    CHOL 140 02/23/2024    CHOL 150 09/25/2023     Lab Results   Component Value Date    HDL 29.3 09/24/2024    HDL 22.7 02/23/2024    HDL 28.1 (A) 09/25/2023     Lab Results   Component Value Date    LDLCALC 66 02/23/2024     Lab Results   Component Value Date    TRIG 258 (H) 02/23/2024    TRIG 303 (H) 09/25/2023    TRIG 197 (H) 02/13/2023       DIABETES ASSESSMENT    CURRENT PHARMACOTHERAPY  Glipizide XL 10 mg 2 pill once daily  Januvia 100 mg daily  Toujeo 36-40 unit in morning     SIDE EFFECTS?  None    HISTORICAL PHARMACOTHERAPY  - metformin, allergy     RISK REDUCTION  - Statin? Yes  - ACE-I/ARB? Yes  - Aspirin? Yes    [facility]  Patient Assistance Program (PAP) Screening    Patient reports he and his spouse files taxes, is unsure if they would qualify based on income.  Will check with spouse and follow up in 1 week.        RECOMMENDATIONS/PLAN  Patients diabetes is well controlled with most recent A1c of 6.3%(goal < 7 %).   Discussed potential switch to Basaglar insulin and 80% dose of current Toujeo dose.   He is overall displeased with this but he is willing to move forward with application for  PAP  He also reports Januvia is significant cost, will apply for  PAP   Education  Counseled patient on MOA, expectations, side effects, duration of therapy, contraindications, administration, and monitoring parameters  Answered all patient questions and concerns    Clinical Pharmacist follow up: 3/21/25 240 pm     Michelle Alexander PharmD  Clinical Pharmacy Specialist, Primary Care   [phone]    Continue all meds under the continuation of care with the referring provider and clinical pharmacy team.    Verbal consent to manage patient's drug therapy was obtained from [the patient or an individual authorized to act on behalf of a patient]. They were informed they may decline to participate or withdraw from participation in pharmacy services at any time.

## 2025-03-15 PROBLEM — M70.22 OLECRANON BURSITIS OF LEFT ELBOW: Status: ACTIVE | Noted: 2025-03-15

## 2025-03-21 ENCOUNTER — TELEMEDICINE (OUTPATIENT)
Dept: PHARMACY | Facility: HOSPITAL | Age: 75
End: 2025-03-21
Payer: MEDICARE

## 2025-03-21 ENCOUNTER — APPOINTMENT (OUTPATIENT)
Dept: PHARMACY | Facility: HOSPITAL | Age: 75
End: 2025-03-21
Payer: MEDICARE

## 2025-03-21 DIAGNOSIS — N18.31 TYPE 2 DIABETES MELLITUS WITH STAGE 3A CHRONIC KIDNEY DISEASE, WITH LONG-TERM CURRENT USE OF INSULIN (MULTI): Primary | ICD-10-CM

## 2025-03-21 DIAGNOSIS — Z79.4 TYPE 2 DIABETES MELLITUS WITH STAGE 3A CHRONIC KIDNEY DISEASE, WITH LONG-TERM CURRENT USE OF INSULIN (MULTI): Primary | ICD-10-CM

## 2025-03-21 DIAGNOSIS — E11.22 TYPE 2 DIABETES MELLITUS WITH STAGE 3A CHRONIC KIDNEY DISEASE, WITH LONG-TERM CURRENT USE OF INSULIN (MULTI): Primary | ICD-10-CM

## 2025-03-21 RX ORDER — INSULIN GLARGINE 100 [IU]/ML
30 INJECTION, SOLUTION SUBCUTANEOUS NIGHTLY
Qty: 15 ML | Refills: 0 | Status: SHIPPED | OUTPATIENT
Start: 2025-03-21

## 2025-03-21 NOTE — PROGRESS NOTES
Clinical Pharmacy Appointment    Patient ID: Edward D Hume is a 74 y.o. male who presents for Diabetes.    Pt is here for First appointment.     Referring Provider: Roselia Ibrahim, *  PCP: Roselia Ibrahim MD   Last visit with PCP: 3/7/25   Next visit with PCP: 5/5/25    HISTORY OF PRESENT ILLNESS    Patient presents with controlled diabetes with cost concerns for his medications.  His Toujeo insulin is no longer covered under his insurance plan.  He was frustrated at last appointment because of this.  Today we discussed transition to Basaglar, biosimilar long acting insulin that is covered on his plan.  We are also addressing cost concern for high copay medication Januvia, potential to apply for [facility].     LAB REVIEW   Glucose (mg/dL)   Date Value   09/24/2024 147 (H)   05/06/2024 105 (H)   05/05/2024 186 (H)     POC HEMOGLOBIN A1c (%)   Date Value   06/03/2024 6.1   02/12/2024 6.0   06/20/2023 5.8     Hemoglobin A1C (%)   Date Value   09/24/2024 6.3 (H)   02/23/2024 5.7 (H)   02/23/2024 5.8 (H)     Bicarbonate (mmol/L)   Date Value   09/24/2024 27   05/06/2024 21   05/05/2024 24     Urea Nitrogen (mg/dL)   Date Value   09/24/2024 24 (H)   05/06/2024 31 (H)   05/05/2024 33 (H)     Creatinine (mg/dL)   Date Value   09/24/2024 1.11   05/06/2024 1.21   05/05/2024 1.37 (H)     Lab Results   Component Value Date    HGBA1C 6.3 (H) 09/24/2024    HGBA1C 6.1 06/03/2024    HGBA1C 5.7 (H) 02/23/2024    HGBA1C 5.8 (H) 02/23/2024     Lab Results   Component Value Date    CHOL 120 09/24/2024    CHOL 140 02/23/2024    CHOL 150 09/25/2023     Lab Results   Component Value Date    HDL 29.3 09/24/2024    HDL 22.7 02/23/2024    HDL 28.1 (A) 09/25/2023     Lab Results   Component Value Date    LDLCALC 66 02/23/2024     Lab Results   Component Value Date    TRIG 258 (H) 02/23/2024    TRIG 303 (H) 09/25/2023    TRIG 197 (H) 02/13/2023       DIABETES ASSESSMENT    CURRENT PHARMACOTHERAPY  Glipizide XL 10 mg 2 pill once  daily  Januvia 100 mg daily  Toujeo 36-40 unit in morning     SIDE EFFECTS?  None    HISTORICAL PHARMACOTHERAPY  - metformin, allergy     RISK REDUCTION  - Statin? Yes  - ACE-I/ARB? Yes  - Aspirin? Yes    BLOOD GLUCOSE MONITORING  Does not check blood glucose   Has fingerstick glucometer and supplies     Patient Assistance Program (PAP) Screening    Patient reports he and his spouse files taxes. He reports income below qualification limit, he will locate required documents and we will follow up in 1 week.     RECOMMENDATIONS/PLAN  Patients diabetes is well controlled with most recent A1c of 6.3%(goal < 7 %).     START Basaglar KWIKPEN insulin 30 units nightly (dose reduced from 36 units for switch from current Toujeo dose)  Send prescription to [facility] per patient request  Denies need for pen needles  He also reports Januvia is significant cost, will apply for  PAP, has one month supply   No need for refills at this time  He does not routinely check blood glucose, he is agreeable to checking FBG a few times over the next week to determine glycemic control with insulin switch   Education  Counseled patient on MOA, expectations, side effects, duration of therapy, contraindications, administration, and monitoring parameters  Answered all patient questions and concerns    Clinical Pharmacist follow up: 3/28/25 240 pm     Michelle Alexander PharmD  Clinical Pharmacy Specialist, Primary Care   [phone]    Continue all meds under the continuation of care with the referring provider and clinical pharmacy team.    Verbal consent to manage patient's drug therapy was obtained from [the patient or an individual authorized to act on behalf of a patient]. They were informed they may decline to participate or withdraw from participation in pharmacy services at any time.

## 2025-03-28 ENCOUNTER — APPOINTMENT (OUTPATIENT)
Dept: PHARMACY | Facility: HOSPITAL | Age: 75
End: 2025-03-28
Payer: MEDICARE

## 2025-03-28 DIAGNOSIS — N18.31 TYPE 2 DIABETES MELLITUS WITH STAGE 3A CHRONIC KIDNEY DISEASE, WITH LONG-TERM CURRENT USE OF INSULIN (MULTI): Primary | ICD-10-CM

## 2025-03-28 DIAGNOSIS — E11.22 TYPE 2 DIABETES MELLITUS WITH STAGE 3A CHRONIC KIDNEY DISEASE, WITH LONG-TERM CURRENT USE OF INSULIN (MULTI): Primary | ICD-10-CM

## 2025-03-28 DIAGNOSIS — Z79.4 TYPE 2 DIABETES MELLITUS WITH STAGE 3A CHRONIC KIDNEY DISEASE, WITH LONG-TERM CURRENT USE OF INSULIN (MULTI): Primary | ICD-10-CM

## 2025-03-28 RX ORDER — INSULIN GLARGINE 100 [IU]/ML
30 INJECTION, SOLUTION SUBCUTANEOUS DAILY
Qty: 15 ML | Refills: 7 | Status: SHIPPED | OUTPATIENT
Start: 2025-03-28

## 2025-03-28 RX ORDER — PEN NEEDLE, DIABETIC 30 GX3/16"
NEEDLE, DISPOSABLE MISCELLANEOUS
Qty: 100 EACH | Refills: 4 | Status: SHIPPED | OUTPATIENT
Start: 2025-03-28

## 2025-03-28 NOTE — PROGRESS NOTES
Clinical Pharmacy Appointment    Patient ID: Edward D Hume is a 74 y.o. male who presents for Diabetes.    Pt is here for Follow Up appointment.     Referring Provider: Roselia Ibrahim, *  PCP: Roselia Ibrahim MD   Last visit with PCP: 3/7/25   Next visit with PCP: 5/5/25    HISTORY OF PRESENT ILLNESS    Patient presents with controlled diabetes with cost concerns for his medications.  His Toujeo insulin is no longer covered under his insurance plan.  He is  frustrated at because of this.  We discussed transition to Basaglar, biosimilar long acting insulin that is covered on his plan, he agreed to switch and prescription was sent to [facility] at last appointment.  He reports he has not picked it up due to copay and frustration with having to switch from current Toujeo.  We are also addressing cost concern for high copay medication Januvia, potential to apply for [facility].     LAB REVIEW   Glucose (mg/dL)   Date Value   09/24/2024 147 (H)   05/06/2024 105 (H)   05/05/2024 186 (H)     POC HEMOGLOBIN A1c (%)   Date Value   06/03/2024 6.1   02/12/2024 6.0   06/20/2023 5.8     Hemoglobin A1C (%)   Date Value   09/24/2024 6.3 (H)   02/23/2024 5.7 (H)   02/23/2024 5.8 (H)     Bicarbonate (mmol/L)   Date Value   09/24/2024 27   05/06/2024 21   05/05/2024 24     Urea Nitrogen (mg/dL)   Date Value   09/24/2024 24 (H)   05/06/2024 31 (H)   05/05/2024 33 (H)     Creatinine (mg/dL)   Date Value   09/24/2024 1.11   05/06/2024 1.21   05/05/2024 1.37 (H)     Lab Results   Component Value Date    HGBA1C 6.3 (H) 09/24/2024    HGBA1C 6.1 06/03/2024    HGBA1C 5.7 (H) 02/23/2024    HGBA1C 5.8 (H) 02/23/2024     Lab Results   Component Value Date    CHOL 120 09/24/2024    CHOL 140 02/23/2024    CHOL 150 09/25/2023     Lab Results   Component Value Date    HDL 29.3 09/24/2024    HDL 22.7 02/23/2024    HDL 28.1 (A) 09/25/2023     Lab Results   Component Value Date    LDLCALC 66 02/23/2024     Lab Results   Component Value Date     TRIG 258 (H) 02/23/2024    TRIG 303 (H) 09/25/2023    TRIG 197 (H) 02/13/2023       DIABETES ASSESSMENT    CURRENT PHARMACOTHERAPY  Glipizide XL 10 mg 2 pill once daily  Januvia 100 mg daily  Toujeo 36-40 unit in morning     SIDE EFFECTS?  None    HISTORICAL PHARMACOTHERAPY  - metformin, allergy     RISK REDUCTION  - Statin? Yes  - ACE-I/ARB? Yes  - Aspirin? Yes    BLOOD GLUCOSE MONITORING  Previously did not check blood glucose  Has fingerstick glucometer and supplies  Reports FBG today 84  Of note he reports pain in back of arm from injections in the past, would likely not be good candidate for CGM     DIET  Breakfast: cereal, bonilla, coffee, orange juice  Lunch: lunchmeat sandwich, salad, pot pie, soup   Dinner: Hamburger, roast, beef, pork, vegetable  Snacks: dessert ice cream sandwich   Drink: water     EXERCISE  Was walking 5 miles per day, no longer walking due to knee injuries  Will be doing more outside with change in season      Patient Assistance Program (PAP) Application submitted for Basaglar and Januvia  Financial documents brought to [facility] retail pharmacy, delivered to pharmacist in person      RECOMMENDATIONS/PLAN    Extensive discussion with patient on current blood sugar management/medications in addition to alternative agents for glycemic control including GLP1ra.  He reports concern with the side effect profile with GLP1ra and is not interested in taking.  We also discussed insurance coverage and change in preferred insulin agent, he is agreeable to switching to Basaglar with cost assistance through  PAP if approved.  Will also be applying for program for Januvia as well.      Patients diabetes is well controlled with most recent A1c of 6.3%(goal < 7 %).     START Basaglar KWIKPEN insulin 30 units nightly (dose reduced from 36 units for switch from current Toujeo dose)  Send prescription to  [facility] for mail to patient; application to  PAP submitted   CONTINUE Januvia 100 mg  daily  Send prescription to [facility] for mail to patient; application to  PAP submitted   CONTINUE glipizide XL 20 mg daily  He does not routinely check blood glucose, he is agreeable to continue checking FBG   Education  Counseled patient on MOA, expectations, side effects, duration of therapy, contraindications, administration, and monitoring parameters  Answered all patient questions and concerns  Future considerations: stop glipizide, dose adjust insulin     Clinical Pharmacist follow up: 4/8/25 1020 am     Michelle Alexander PharmD  Clinical Pharmacy Specialist, Primary Care   [phone]    Continue all meds under the continuation of care with the referring provider and clinical pharmacy team.    Verbal consent to manage patient's drug therapy was obtained from [the patient or an individual authorized to act on behalf of a patient]. They were informed they may decline to participate or withdraw from participation in pharmacy services at any time.

## 2025-04-04 PROCEDURE — RXMED WILLOW AMBULATORY MEDICATION CHARGE

## 2025-04-08 ENCOUNTER — PHARMACY VISIT (OUTPATIENT)
Dept: PHARMACY | Facility: CLINIC | Age: 75
End: 2025-04-08
Payer: MEDICARE

## 2025-04-08 ENCOUNTER — APPOINTMENT (OUTPATIENT)
Dept: PHARMACY | Facility: HOSPITAL | Age: 75
End: 2025-04-08
Payer: MEDICARE

## 2025-04-08 DIAGNOSIS — N18.31 TYPE 2 DIABETES MELLITUS WITH STAGE 3A CHRONIC KIDNEY DISEASE, WITH LONG-TERM CURRENT USE OF INSULIN (MULTI): ICD-10-CM

## 2025-04-08 DIAGNOSIS — E11.22 TYPE 2 DIABETES MELLITUS WITH STAGE 3A CHRONIC KIDNEY DISEASE, WITH LONG-TERM CURRENT USE OF INSULIN (MULTI): ICD-10-CM

## 2025-04-08 DIAGNOSIS — Z79.4 TYPE 2 DIABETES MELLITUS WITH STAGE 3A CHRONIC KIDNEY DISEASE, WITH LONG-TERM CURRENT USE OF INSULIN (MULTI): ICD-10-CM

## 2025-04-08 NOTE — PROGRESS NOTES
Clinical Pharmacy Appointment    Patient ID: Edward D Hume is a 74 y.o. male who presents for No chief complaint on file..    Pt is here for Follow Up appointment.     Referring Provider: Roselia Ibrahim, *  PCP: Roselia Ibrahim MD   Last visit with PCP: 3/7/25   Next visit with PCP: 5/5/25     Patient Assistance Program Approval:     We are pleased to inform you that your application for assistance has been approved.     This approval is valid through 4/4/26 as long as the following criteria continue to be satisfied:     Your medication (Januvia and Basaglar) remains covered under your current insurance plan.   Your prescriber does not discontinue therapy.   You do not seek reimbursement from any other private or government-funded programs for the  medication.    Under this program, the pharmacy will first bill your insurance plan for your indemnified specified medication. The [employer] Assistance Fund will then offset your copay balance, so that your out-of pocket expense for your specialty medication will be $0.00.    HISTORY OF PRESENT ILLNESS    Patient presents with controlled diabetes with cost concerns for his medications.  His was approved for  PAP for Januvia and Basaglar.  Switching from Toujeo to Basaglar due to insurance no longer covering Toujeo.  He reports he will be receiving prescriptions in the mail tomorrow.  He recently started checking his blood sugars to evaluate blood sugar control throughout insulin transition.       LAB REVIEW   Glucose (mg/dL)   Date Value   09/24/2024 147 (H)   05/06/2024 105 (H)   05/05/2024 186 (H)     POC HEMOGLOBIN A1c (%)   Date Value   06/03/2024 6.1   02/12/2024 6.0   06/20/2023 5.8     Hemoglobin A1C (%)   Date Value   09/24/2024 6.3 (H)   02/23/2024 5.7 (H)   02/23/2024 5.8 (H)     Bicarbonate (mmol/L)   Date Value   09/24/2024 27   05/06/2024 21   05/05/2024 24     Urea Nitrogen (mg/dL)   Date Value   09/24/2024 24 (H)   05/06/2024 31 (H)    05/05/2024 33 (H)     Creatinine (mg/dL)   Date Value   09/24/2024 1.11   05/06/2024 1.21   05/05/2024 1.37 (H)     Lab Results   Component Value Date    HGBA1C 6.3 (H) 09/24/2024    HGBA1C 6.1 06/03/2024    HGBA1C 5.7 (H) 02/23/2024    HGBA1C 5.8 (H) 02/23/2024     Lab Results   Component Value Date    CHOL 120 09/24/2024    CHOL 140 02/23/2024    CHOL 150 09/25/2023     Lab Results   Component Value Date    HDL 29.3 09/24/2024    HDL 22.7 02/23/2024    HDL 28.1 (A) 09/25/2023     Lab Results   Component Value Date    LDLCALC 66 02/23/2024     Lab Results   Component Value Date    TRIG 258 (H) 02/23/2024    TRIG 303 (H) 09/25/2023    TRIG 197 (H) 02/13/2023       DIABETES ASSESSMENT    CURRENT PHARMACOTHERAPY  Glipizide XL 10 mg 2 pill once daily  Januvia 100 mg daily  Toujeo 36-40 unit in morning     SIDE EFFECTS?  None    HISTORICAL PHARMACOTHERAPY  - metformin, allergy     RISK REDUCTION  - Statin? Yes  - ACE-I/ARB? Yes  - Aspirin? Yes    BLOOD GLUCOSE MONITORING  Previously did not check blood glucose  Has fingerstick glucometer and supplies  Reports FBG today 137  Of note he reports pain in back of arm from injections in the past, would likely not be good candidate for CGM     DIET  Breakfast: cereal, bonilla, coffee, orange juice  Lunch: lunchmeat sandwich, salad, pot pie, soup   Dinner: Hamburger, roast, beef, pork, vegetable  Snacks: dessert ice cream sandwich   Drink: water     EXERCISE  Was walking 5 miles per day, no longer walking due to knee injuries  Will be doing more outside with change in season       RECOMMENDATIONS/PLAN    Extensive discussion with patient on current blood sugar management/medications in addition to alternative agents for glycemic control including GLP1ra.  He reports concern with the side effect profile with GLP1ra and is not interested in taking.  We also discussed insurance coverage and change in preferred insulin agent, he is agreeable to switching to Basaglar.   PAP  approved.     Patients diabetes is well controlled with most recent A1c of 6.3%(goal < 7 %).     START Basaglar KWIKPEN insulin 30 units nightly (dose reduced from 36 units for switch from current Toujeo dose)  No refill needed at this time  CONTINUE Januvia 100 mg daily  No refill needed at this time  CONTINUE glipizide XL 20 mg daily  He does not routinely check blood glucose, he is agreeable to continue checking FBG   Education  Counseled patient on MOA, expectations, side effects, duration of therapy, contraindications, administration, and monitoring parameters  Answered all patient questions and concerns  Future considerations: stop glipizide, dose adjust insulin     Clinical Pharmacist follow up: 5/6/25 1040 am     Michelle Alexander PharmD  Clinical Pharmacy Specialist, Primary Care   [phone]    Continue all meds under the continuation of care with the referring provider and clinical pharmacy team.    Verbal consent to manage patient's drug therapy was obtained from [the patient or an individual authorized to act on behalf of a patient]. They were informed they may decline to participate or withdraw from participation in pharmacy services at any time.

## 2025-05-01 PROCEDURE — RXMED WILLOW AMBULATORY MEDICATION CHARGE

## 2025-05-03 ENCOUNTER — PHARMACY VISIT (OUTPATIENT)
Dept: PHARMACY | Facility: CLINIC | Age: 75
End: 2025-05-03
Payer: MEDICARE

## 2025-05-05 ENCOUNTER — APPOINTMENT (OUTPATIENT)
Dept: PRIMARY CARE | Facility: CLINIC | Age: 75
End: 2025-05-05
Payer: MEDICARE

## 2025-05-05 DIAGNOSIS — R06.02 SHORTNESS OF BREATH: ICD-10-CM

## 2025-05-05 DIAGNOSIS — Z79.4 TYPE 2 DIABETES MELLITUS WITH STAGE 3A CHRONIC KIDNEY DISEASE, WITH LONG-TERM CURRENT USE OF INSULIN (MULTI): ICD-10-CM

## 2025-05-05 DIAGNOSIS — Z95.1 S/P CABG X 1: ICD-10-CM

## 2025-05-05 DIAGNOSIS — E11.9 TYPE 2 DIABETES MELLITUS WITHOUT COMPLICATION, WITH LONG-TERM CURRENT USE OF INSULIN: ICD-10-CM

## 2025-05-05 DIAGNOSIS — I10 HYPERTENSION, UNSPECIFIED TYPE: ICD-10-CM

## 2025-05-05 DIAGNOSIS — M10.9 GOUT, UNSPECIFIED CAUSE, UNSPECIFIED CHRONICITY, UNSPECIFIED SITE: ICD-10-CM

## 2025-05-05 DIAGNOSIS — K21.9 GASTROESOPHAGEAL REFLUX DISEASE WITHOUT ESOPHAGITIS: Chronic | ICD-10-CM

## 2025-05-05 DIAGNOSIS — E11.22 TYPE 2 DIABETES MELLITUS WITH STAGE 3A CHRONIC KIDNEY DISEASE, WITH LONG-TERM CURRENT USE OF INSULIN (MULTI): ICD-10-CM

## 2025-05-05 DIAGNOSIS — N18.31 TYPE 2 DIABETES MELLITUS WITH STAGE 3A CHRONIC KIDNEY DISEASE, WITH LONG-TERM CURRENT USE OF INSULIN (MULTI): ICD-10-CM

## 2025-05-05 DIAGNOSIS — N40.0 BENIGN PROSTATIC HYPERPLASIA, UNSPECIFIED WHETHER LOWER URINARY TRACT SYMPTOMS PRESENT: ICD-10-CM

## 2025-05-05 DIAGNOSIS — Z79.4 TYPE 2 DIABETES MELLITUS WITHOUT COMPLICATION, WITH LONG-TERM CURRENT USE OF INSULIN: ICD-10-CM

## 2025-05-05 RX ORDER — INSULIN GLARGINE 100 [IU]/ML
30 INJECTION, SOLUTION SUBCUTANEOUS DAILY
Qty: 15 ML | Refills: 7 | Status: SHIPPED | OUTPATIENT
Start: 2025-05-05 | End: 2025-05-06 | Stop reason: SDUPTHER

## 2025-05-05 RX ORDER — AMLODIPINE BESYLATE 10 MG/1
10 TABLET ORAL DAILY
Qty: 30 TABLET | Refills: 4 | Status: SHIPPED | OUTPATIENT
Start: 2025-05-05

## 2025-05-05 RX ORDER — TAMSULOSIN HYDROCHLORIDE 0.4 MG/1
0.4 CAPSULE ORAL DAILY
Qty: 180 CAPSULE | Refills: 0 | Status: SHIPPED | OUTPATIENT
Start: 2025-05-05

## 2025-05-05 RX ORDER — GLIPIZIDE 10 MG/1
20 TABLET, FILM COATED, EXTENDED RELEASE ORAL DAILY
Qty: 60 TABLET | Refills: 2 | Status: SHIPPED | OUTPATIENT
Start: 2025-05-05

## 2025-05-05 RX ORDER — METOPROLOL TARTRATE 25 MG/1
25 TABLET, FILM COATED ORAL 2 TIMES DAILY
Qty: 180 TABLET | Refills: 3 | Status: SHIPPED | OUTPATIENT
Start: 2025-05-05 | End: 2026-05-05

## 2025-05-05 RX ORDER — FAMOTIDINE 20 MG/1
20 TABLET, FILM COATED ORAL NIGHTLY
Qty: 30 TABLET | Refills: 4 | Status: SHIPPED | OUTPATIENT
Start: 2025-05-05

## 2025-05-05 RX ORDER — ALLOPURINOL 300 MG/1
300 TABLET ORAL DAILY
Qty: 30 TABLET | Refills: 4 | Status: SHIPPED | OUTPATIENT
Start: 2025-05-05

## 2025-05-05 RX ORDER — SITAGLIPTIN 100 MG/1
100 TABLET, FILM COATED ORAL DAILY
Qty: 30 TABLET | Refills: 3 | Status: SHIPPED | OUTPATIENT
Start: 2025-05-05

## 2025-05-05 RX ORDER — GEMFIBROZIL 600 MG/1
600 TABLET, FILM COATED ORAL 2 TIMES DAILY
Qty: 60 TABLET | Refills: 2 | Status: SHIPPED | OUTPATIENT
Start: 2025-05-05

## 2025-05-05 RX ORDER — OMEPRAZOLE 40 MG/1
40 CAPSULE, DELAYED RELEASE ORAL
Qty: 30 CAPSULE | Refills: 4 | Status: SHIPPED | OUTPATIENT
Start: 2025-05-05

## 2025-05-05 RX ORDER — ALBUTEROL SULFATE 90 UG/1
2 INHALANT RESPIRATORY (INHALATION) EVERY 6 HOURS PRN
Qty: 18 G | Refills: 2 | Status: SHIPPED | OUTPATIENT
Start: 2025-05-05

## 2025-05-05 RX ORDER — VALSARTAN AND HYDROCHLOROTHIAZIDE 160; 12.5 MG/1; MG/1
1 TABLET, FILM COATED ORAL DAILY
Qty: 30 TABLET | Refills: 4 | Status: SHIPPED | OUTPATIENT
Start: 2025-05-05

## 2025-05-06 ENCOUNTER — APPOINTMENT (OUTPATIENT)
Dept: PHARMACY | Facility: HOSPITAL | Age: 75
End: 2025-05-06
Payer: MEDICARE

## 2025-05-06 DIAGNOSIS — E11.22 TYPE 2 DIABETES MELLITUS WITH STAGE 3A CHRONIC KIDNEY DISEASE, WITH LONG-TERM CURRENT USE OF INSULIN (MULTI): ICD-10-CM

## 2025-05-06 DIAGNOSIS — Z79.4 TYPE 2 DIABETES MELLITUS WITH STAGE 3A CHRONIC KIDNEY DISEASE, WITH LONG-TERM CURRENT USE OF INSULIN (MULTI): ICD-10-CM

## 2025-05-06 DIAGNOSIS — N18.31 TYPE 2 DIABETES MELLITUS WITH STAGE 3A CHRONIC KIDNEY DISEASE, WITH LONG-TERM CURRENT USE OF INSULIN (MULTI): ICD-10-CM

## 2025-05-06 RX ORDER — INSULIN GLARGINE 100 [IU]/ML
30 INJECTION, SOLUTION SUBCUTANEOUS DAILY
Qty: 15 ML | Refills: 7 | Status: SHIPPED | OUTPATIENT
Start: 2025-05-06

## 2025-05-06 NOTE — PROGRESS NOTES
Clinical Pharmacy Appointment    Patient ID: Edward D Hume is a 74 y.o. male who presents for No chief complaint on file..    Pt is here for Follow Up appointment.     Referring Provider: Roselia Ibrahim, *  PCP: Roselia Ibrahim MD   Last visit with PCP: 3/7/25   Next visit with PCP: 5/14/25     Patient Assistance Program Approval:     We are pleased to inform you that your application for assistance has been approved.     This approval is valid through 4/4/26 as long as the following criteria continue to be satisfied:     Your medication (Januvia and Basaglar) remains covered under your current insurance plan.   Your prescriber does not discontinue therapy.   You do not seek reimbursement from any other private or government-funded programs for the  medication.    Under this program, the pharmacy will first bill your insurance plan for your indemnified specified medication. The [employer] Assistance Fund will then offset your copay balance, so that your out-of pocket expense for your specialty medication will be $0.00.    HISTORY OF PRESENT ILLNESS    Patient presents with controlled diabetes with cost concerns for his medications.  His was approved for  PAP for Januvia and Basaglar.  Switching from Toujeo to Basaglar due to insurance no longer covering Toujeo.  He reports today that he has not started Basaglar yet as he has supply of Toujeo remaining.       LAB REVIEW   Glucose (mg/dL)   Date Value   09/24/2024 147 (H)   05/06/2024 105 (H)   05/05/2024 186 (H)     POC HEMOGLOBIN A1c (%)   Date Value   06/03/2024 6.1   02/12/2024 6.0   06/20/2023 5.8     Hemoglobin A1C (%)   Date Value   09/24/2024 6.3 (H)   02/23/2024 5.7 (H)   02/23/2024 5.8 (H)     Bicarbonate (mmol/L)   Date Value   09/24/2024 27   05/06/2024 21   05/05/2024 24     Urea Nitrogen (mg/dL)   Date Value   09/24/2024 24 (H)   05/06/2024 31 (H)   05/05/2024 33 (H)     Creatinine (mg/dL)   Date Value   09/24/2024 1.11   05/06/2024  1.21   05/05/2024 1.37 (H)     Lab Results   Component Value Date    HGBA1C 6.3 (H) 09/24/2024    HGBA1C 6.1 06/03/2024    HGBA1C 5.7 (H) 02/23/2024    HGBA1C 5.8 (H) 02/23/2024     Lab Results   Component Value Date    CHOL 120 09/24/2024    CHOL 140 02/23/2024    CHOL 150 09/25/2023     Lab Results   Component Value Date    HDL 29.3 09/24/2024    HDL 22.7 02/23/2024    HDL 28.1 (A) 09/25/2023     Lab Results   Component Value Date    LDLCALC 66 02/23/2024     Lab Results   Component Value Date    TRIG 258 (H) 02/23/2024    TRIG 303 (H) 09/25/2023    TRIG 197 (H) 02/13/2023       DIABETES ASSESSMENT    CURRENT PHARMACOTHERAPY  Glipizide XL 10 mg 2 pill once daily  Januvia 100 mg daily  Toujeo 36 unit in morning     SIDE EFFECTS?  None    HISTORICAL PHARMACOTHERAPY  - metformin, allergy     RISK REDUCTION  - Statin? Yes  - ACE-I/ARB? Yes  - Aspirin? Yes    BLOOD GLUCOSE MONITORING  Previously did not check blood glucose  Has fingerstick glucometer and supplies  Reports FBG today 120  Of note he reports pain in back of arm from injections in the past, would likely not be good candidate for CGM     Denies low blood sugars, reports he does experience feeling shaky occasionally but has not correlated to his blood sugar, instructed to check blood sugar at that time     DIET  Breakfast: cereal, bonilla, coffee, orange juice  Lunch: lunchmeat sandwich, salad, pot pie, soup   Dinner: Hamburger, roast, beef, pork, vegetable  Snacks: dessert ice cream sandwich   Drink: water     EXERCISE  Was walking 5 miles per day, no longer walking due to knee injuries  Will be doing more outside with change in season       RECOMMENDATIONS/PLAN     PAP approved for Basaglar and Januvia, he has not switched insulins yet and is still on Toujeo.  Continue current plan with switch to Basaglar.     Patients diabetes is well controlled with most recent A1c of 6.3%(goal < 7 %).     CONTINUE Toujeo 36 units daily until finish current supply then  SWITCH to Basaglar 30 units daily  Resend prescription to [facility] for mail to patient   CONTINUE Januvia 100 mg daily  No refill needed at this time  CONTINUE glipizide XL 20 mg daily  Continue checking blood glucose, monitor for hypoglycemia and keep log of any low events if any    Education  Counseled patient on MOA, expectations, side effects, duration of therapy, contraindications, administration, and monitoring parameters  The American Diabetes Association recommends the “15-15 rule” to treat an episode of mild to moderate hypoglycemia:  Eat or drink 15 grams of fast-acting carbs to raise your blood sugar.  After 15 minutes, check your blood sugar.  If it’s still below 70 mg/dL, have another 15 grams of fast-acting carbs.  Repeat until your blood sugar is at least 70 mg/dL.  To know how many carbs you’re eating, read food nutrition labels.   Some examples of foods that contain about 15 grams of fast-acting carbs include:  1 small piece of fruit, such as half a banana.  4 ounces (half-cup) of juice or regular soda (not diet).  1 tablespoon of sugar, honey or syrup.  1 tube of instant glucose gel (check the instructions).  3 to 4 glucose tablets (check the instructions).  If you have symptoms of hypoglycemia but can’t check your blood sugar, use the 15-15 rule until you feel better.  Answered all patient questions and concerns  Future considerations: stop glipizide, dose adjust insulin     Clinical Pharmacist follow up: 3 months 8/5/25 1040 am     Michelle Alexander PharmD  Clinical Pharmacy Specialist, Primary Care   [phone]    Continue all meds under the continuation of care with the referring provider and clinical pharmacy team.    Verbal consent to manage patient's drug therapy was obtained from [the patient or an individual authorized to act on behalf of a patient]. They were informed they may decline to participate or withdraw from participation in pharmacy services at any time.

## 2025-05-14 ENCOUNTER — APPOINTMENT (OUTPATIENT)
Dept: PRIMARY CARE | Facility: CLINIC | Age: 75
End: 2025-05-14
Payer: MEDICARE

## 2025-05-14 VITALS
BODY MASS INDEX: 27.7 KG/M2 | SYSTOLIC BLOOD PRESSURE: 150 MMHG | HEART RATE: 62 BPM | WEIGHT: 209 LBS | DIASTOLIC BLOOD PRESSURE: 88 MMHG | HEIGHT: 73 IN

## 2025-05-14 DIAGNOSIS — Z95.1 S/P CABG X 1: Primary | ICD-10-CM

## 2025-05-14 DIAGNOSIS — N18.31 TYPE 2 DIABETES MELLITUS WITH STAGE 3A CHRONIC KIDNEY DISEASE, WITH LONG-TERM CURRENT USE OF INSULIN (MULTI): Chronic | ICD-10-CM

## 2025-05-14 DIAGNOSIS — N40.0 BENIGN PROSTATIC HYPERPLASIA WITHOUT LOWER URINARY TRACT SYMPTOMS: Chronic | ICD-10-CM

## 2025-05-14 DIAGNOSIS — R41.3 MEMORY CHANGE: ICD-10-CM

## 2025-05-14 DIAGNOSIS — E11.22 TYPE 2 DIABETES MELLITUS WITH STAGE 3A CHRONIC KIDNEY DISEASE, WITH LONG-TERM CURRENT USE OF INSULIN (MULTI): Chronic | ICD-10-CM

## 2025-05-14 DIAGNOSIS — Z79.4 TYPE 2 DIABETES MELLITUS WITH STAGE 3A CHRONIC KIDNEY DISEASE, WITH LONG-TERM CURRENT USE OF INSULIN (MULTI): Chronic | ICD-10-CM

## 2025-05-14 DIAGNOSIS — E55.9 HYPOVITAMINOSIS D: ICD-10-CM

## 2025-05-14 DIAGNOSIS — K21.9 GASTROESOPHAGEAL REFLUX DISEASE WITHOUT ESOPHAGITIS: Chronic | ICD-10-CM

## 2025-05-14 DIAGNOSIS — M17.11 ARTHRITIS OF RIGHT KNEE: ICD-10-CM

## 2025-05-14 DIAGNOSIS — N18.31 STAGE 3A CHRONIC KIDNEY DISEASE (MULTI): Chronic | ICD-10-CM

## 2025-05-14 DIAGNOSIS — M10.9 GOUT, UNSPECIFIED CAUSE, UNSPECIFIED CHRONICITY, UNSPECIFIED SITE: ICD-10-CM

## 2025-05-14 DIAGNOSIS — M25.551 RIGHT HIP PAIN: ICD-10-CM

## 2025-05-14 DIAGNOSIS — I10 PRIMARY HYPERTENSION: Chronic | ICD-10-CM

## 2025-05-14 DIAGNOSIS — E78.2 MIXED HYPERLIPIDEMIA: Chronic | ICD-10-CM

## 2025-05-14 PROBLEM — Z12.11 COLON CANCER SCREENING: Status: RESOLVED | Noted: 2023-06-20 | Resolved: 2025-05-14

## 2025-05-14 PROBLEM — E83.51 HYPOCALCEMIA: Status: RESOLVED | Noted: 2024-06-03 | Resolved: 2025-05-14

## 2025-05-14 PROCEDURE — 99214 OFFICE O/P EST MOD 30 MIN: CPT | Performed by: INTERNAL MEDICINE

## 2025-05-14 PROCEDURE — 1036F TOBACCO NON-USER: CPT | Performed by: INTERNAL MEDICINE

## 2025-05-14 PROCEDURE — RXMED WILLOW AMBULATORY MEDICATION CHARGE

## 2025-05-14 PROCEDURE — 3079F DIAST BP 80-89 MM HG: CPT | Performed by: INTERNAL MEDICINE

## 2025-05-14 PROCEDURE — 3077F SYST BP >= 140 MM HG: CPT | Performed by: INTERNAL MEDICINE

## 2025-05-14 PROCEDURE — G2211 COMPLEX E/M VISIT ADD ON: HCPCS | Performed by: INTERNAL MEDICINE

## 2025-05-14 PROCEDURE — 3008F BODY MASS INDEX DOCD: CPT | Performed by: INTERNAL MEDICINE

## 2025-05-14 ASSESSMENT — LIFESTYLE VARIABLES
HOW MANY STANDARD DRINKS CONTAINING ALCOHOL DO YOU HAVE ON A TYPICAL DAY: 1 OR 2
HOW OFTEN DO YOU HAVE SIX OR MORE DRINKS ON ONE OCCASION: NEVER
SKIP TO QUESTIONS 9-10: 1
AUDIT-C TOTAL SCORE: 1
HOW OFTEN DO YOU HAVE A DRINK CONTAINING ALCOHOL: MONTHLY OR LESS

## 2025-05-14 ASSESSMENT — ENCOUNTER SYMPTOMS
ARTHRALGIAS: 1
CHILLS: 0
WHEEZING: 0
NUMBNESS: 0
JOINT SWELLING: 1
DIZZINESS: 0
COUGH: 0
DIARRHEA: 0
ABDOMINAL PAIN: 0
CONFUSION: 0
WOUND: 0
BACK PAIN: 1
EYE DISCHARGE: 0
SHORTNESS OF BREATH: 0
DIFFICULTY URINATING: 0
FLANK PAIN: 0
NAUSEA: 0
VOMITING: 0
FEVER: 0
DYSURIA: 0
MYALGIAS: 1

## 2025-05-14 ASSESSMENT — PATIENT HEALTH QUESTIONNAIRE - PHQ9
2. FEELING DOWN, DEPRESSED OR HOPELESS: NOT AT ALL
SUM OF ALL RESPONSES TO PHQ9 QUESTIONS 1 AND 2: 0
1. LITTLE INTEREST OR PLEASURE IN DOING THINGS: NOT AT ALL

## 2025-05-14 NOTE — ASSESSMENT & PLAN NOTE
- Will check TSH with free T4, vitamin B12, folate levels  - Will consider dementia/[facility] evaluation and geriatric referral if continues to persist/worsen

## 2025-05-14 NOTE — PROGRESS NOTES
Subjective   Subjective:     History Of Present Illness:  Edward D Hume is a 74 y.o. male with a PMH of HTN, HLD, OA, T2DM, CKD stage 3a, GERD, BPH, and gout, who presents to [facility] clinic for follow up visit.  Today, he complains of worsening right knee and hip pain over the past 2-3 of months that makes him limp.  The pain is primarily in right knee and associated with crepitus and clicking noise.  His right hip pain is mostly in the lower back over the upper portion of the buttock.  The knee pain worsens with ambulation and weightbearing such as going up the stairs.  He used to play many sports in his childhood and young adulthood such as baseball, football, and track, and recalls many injuries.  He admits to never having physical therapy to address the right knee or hip pain, never had steroid injections of the affected joints, or taking medication such as NSAIDs in the recent years.  His other complaint today is a memory change associated with forgetfulness over the past month where sometimes he forgets the words or thoughts mid conversation.  He is a musician and in some instances he may start having issues while playing his guitar where he does not remember how to play the next part of the ponce that he used to know how to play before.      Past Medical History:  He has a past medical history of Acute nonparalytic poliomyelitis ([facility]), Awareness under anesthesia, Body mass index (BMI) 25.0-25.9, adult (10/26/2019), Body mass index (BMI) 27.0-27.9, adult (09/22/2021), Body mass index (BMI) 29.0-29.9, adult (08/31/2019), CKD (chronic kidney disease), Crushing injury of unspecified finger(s), initial encounter (06/12/2014), Diabetes mellitus (Multi), Disorder of the skin and subcutaneous tissue, unspecified (09/04/2019), Disorder of the skin and subcutaneous tissue, unspecified (03/30/2016), Elevated prostate specific antigen (PSA), GERD (gastroesophageal reflux disease), Gout, History of falling  (06/27/2016), HLD (hyperlipidemia), HTN (hypertension), Other chest pain (03/07/2018), Other conditions influencing health status (02/22/2017), Other conditions influencing health status (03/04/2016), Other conditions influencing health status, Personal history of other mental and behavioral disorders (11/06/2018), Personal history of other specified conditions (04/16/2019), Personal history of other specified conditions (04/16/2019), Personal history of other specified conditions (06/29/2016), Puncture wound without foreign body of left hand, initial encounter (03/05/2019), Puncture wound without foreign body of unspecified hand, initial encounter (03/05/2019), Superficial mycosis, unspecified (12/01/2014), Trigger finger, left middle finger (07/24/2017), Trigger finger, left middle finger (07/24/2017), Trigger finger, left ring finger (07/24/2017), Trigger finger, right index finger (07/24/2017), and Trigger finger, right middle finger (11/10/2017).    Past Surgical History:  He has a past surgical history that includes Hand tendon surgery (02/15/2013); Other surgical history (04/09/2013); Other surgical history (07/02/2020); Other surgical history (12/06/2017); Other surgical history (06/12/2013); Other surgical history (06/12/2013); Shoulder surgery (06/12/2013); Cardiac catheterization; Knee arthroscopy w/ debridement; Cardiac catheterization (N/A, 05/01/2024); and Coronary artery bypass graft.     Social History:  He reports that he has quit smoking. His smoking use included cigarettes. He has been exposed to tobacco smoke. He has never used smokeless tobacco. He reports current alcohol use. He reports that he does not use drugs.    Family History:  Family History[1]  Allergies:  Amoxicillin, Metformin, and Iodinated contrast media    Home Medications:  Prescriptions Prior to Admission[2]  Review Of Systems:  11-point ROS was performed and is negative except as noted below and in the HPI.     Review of Systems  "  Constitutional:  Negative for chills and fever.        +Forgetfulness   Eyes:  Negative for discharge.   Respiratory:  Negative for cough, shortness of breath and wheezing.    Cardiovascular:  Negative for chest pain and leg swelling.   Gastrointestinal:  Negative for abdominal pain, diarrhea, nausea and vomiting.   Genitourinary:  Negative for difficulty urinating, dysuria and flank pain.   Musculoskeletal:  Positive for arthralgias, back pain, gait problem, joint swelling and myalgias.   Skin:  Negative for wound.   Neurological:  Negative for dizziness and numbness.   Psychiatric/Behavioral:  Negative for confusion.         Objective   Objective:     /88 (BP Location: Left arm, Patient Position: Sitting, BP Cuff Size: Adult)   Pulse 62   Ht 1.854 m (6' 1\")   Wt 94.8 kg (209 lb)   BMI 27.57 kg/m²     Physical Exam  Constitutional:       General: He is not in acute distress.     Appearance: Normal appearance.   HENT:      Head: Normocephalic and atraumatic.      Nose: Nose normal.      Mouth/Throat:      Mouth: Mucous membranes are moist.   Eyes:      Conjunctiva/sclera: Conjunctivae normal.      Pupils: Pupils are equal, round, and reactive to light.   Cardiovascular:      Rate and Rhythm: Normal rate and regular rhythm.      Heart sounds: Normal heart sounds.   Pulmonary:      Effort: Pulmonary effort is normal. No respiratory distress.      Breath sounds: Normal breath sounds.   Abdominal:      General: Bowel sounds are normal. There is no distension.      Palpations: Abdomen is soft.      Tenderness: There is no abdominal tenderness.   Musculoskeletal:         General: Swelling (Right knee joint) and tenderness (Right knee) present.      Right knee: Swelling, bony tenderness and crepitus present. Decreased range of motion. Tenderness present over the lateral joint line, MCL and LCL.      Right lower leg: No edema.      Left lower leg: No edema.   Skin:     General: Skin is warm and dry.      " Coloration: Skin is not jaundiced.   Neurological:      General: No focal deficit present.      Mental Status: He is alert. Mental status is at baseline.          Assessment & Plan:     Assessment/Plan     Problem List Items Addressed This Visit       Benign prostatic hyperplasia without lower urinary tract symptoms (Chronic)    Stage 3a chronic kidney disease (Multi) (Chronic)    Gastroesophageal reflux disease without esophagitis (Chronic)    Gout    Mixed hyperlipidemia (Chronic)    Relevant Orders    Lipid Panel    TSH with reflex to Free T4 if abnormal    Primary hypertension (Chronic)    - Slightly elevated BP in office today 150/88  - Continue taking amlodipine 10 mg, metoprolol 25 mg twice daily, valsartan-hydrochlorothiazide 160-12.5 mg  - Encouraged to start measuring blood pressure daily         Relevant Orders    CBC    Albumin-Creatinine Ratio, Urine Random    Comprehensive Metabolic Panel    Vitamin D 25-Hydroxy,Total (for eval of Vitamin D levels)    Type 2 diabetes mellitus with stage 3a chronic kidney disease, with long-term current use of insulin (Multi) (Chronic)    Relevant Orders    Hemoglobin A1C    S/P CABG x 1 - Primary    Arthritis of right knee    - Recommended to start using OTC Voltaren/diclofenac gel for arthritic joints  - X-ray of both knees and right hip ordered  - Sports medicine referral sent for a possible steroid injection  - PT referral sent         Relevant Orders    XR knee 1-2 views bilateral    Referral to Sports Medicine    Referral to Physical Therapy    Right hip pain    Relevant Orders    XR hip right with pelvis when performed 2 or 3 views    Referral to Sports Medicine    Referral to Physical Therapy    Memory change    - Will check TSH with free T4, vitamin B12, folate levels  - Will consider dementia/MoCA evaluation and geriatric referral if continues to persist/worsen         Relevant Orders    Vitamin B12    Folate     Other Visit Diagnoses         Hypovitaminosis D         Relevant Orders    Vitamin D 25-Hydroxy,Total (for eval of Vitamin D levels)          #Health Maintenance:  - Cologuard done 1/2025 - normal, repeat in 3 years  - Denies DEXA scan  - Routine labs ordered today    Revisit Topics: Right knee and hip pain, forgetfulness.    Dispo: Patient is recommended to return to clinic in 3 months.    Pj Ivy, PGY-3  Internal Medicine     Disclaimer: Documentation completed with the information available at the time of input. The times in the chart may not be reflective of actual patient care times, interventions, or procedures. Documentation occurs after the physical care of the patient.          [1] No family history on file.  [2] (Not in a hospital admission)

## 2025-05-14 NOTE — ASSESSMENT & PLAN NOTE
- Recommended to start using OTC Voltaren/diclofenac gel for arthritic joints  - X-ray of both knees and right hip ordered  - Sports medicine referral sent for a possible steroid injection  - PT referral sent

## 2025-05-14 NOTE — ASSESSMENT & PLAN NOTE
- Slightly elevated BP in office today 150/88  - Continue taking amlodipine 10 mg, metoprolol 25 mg twice daily, valsartan-hydrochlorothiazide 160-12.5 mg  - Encouraged to start measuring blood pressure daily

## 2025-05-15 ENCOUNTER — PHARMACY VISIT (OUTPATIENT)
Dept: PHARMACY | Facility: CLINIC | Age: 75
End: 2025-05-15
Payer: MEDICARE

## 2025-05-15 LAB
25(OH)D3+25(OH)D2 SERPL-MCNC: 40 NG/ML (ref 30–100)
ALBUMIN SERPL-MCNC: 4.6 G/DL (ref 3.6–5.1)
ALBUMIN/CREAT UR: 297 MG/G CREAT
ALP SERPL-CCNC: 50 U/L (ref 35–144)
ALT SERPL-CCNC: 16 U/L (ref 9–46)
ANION GAP SERPL CALCULATED.4IONS-SCNC: 10 MMOL/L (CALC) (ref 7–17)
AST SERPL-CCNC: 16 U/L (ref 10–35)
BILIRUB SERPL-MCNC: 0.5 MG/DL (ref 0.2–1.2)
BUN SERPL-MCNC: 24 MG/DL (ref 7–25)
CALCIUM SERPL-MCNC: 8.9 MG/DL (ref 8.6–10.3)
CHLORIDE SERPL-SCNC: 108 MMOL/L (ref 98–110)
CHOLEST SERPL-MCNC: 129 MG/DL
CHOLEST/HDLC SERPL: 4 (CALC)
CO2 SERPL-SCNC: 24 MMOL/L (ref 20–32)
CREAT SERPL-MCNC: 1.22 MG/DL (ref 0.7–1.28)
CREAT UR-MCNC: 138 MG/DL (ref 20–320)
EGFRCR SERPLBLD CKD-EPI 2021: 63 ML/MIN/1.73M2
ERYTHROCYTE [DISTWIDTH] IN BLOOD BY AUTOMATED COUNT: 12.9 % (ref 11–15)
EST. AVERAGE GLUCOSE BLD GHB EST-MCNC: 134 MG/DL
EST. AVERAGE GLUCOSE BLD GHB EST-SCNC: 7.4 MMOL/L
FOLATE SERPL-MCNC: 21.4 NG/ML
GLUCOSE SERPL-MCNC: 151 MG/DL (ref 65–99)
HBA1C MFR BLD: 6.3 %
HCT VFR BLD AUTO: 42.4 % (ref 38.5–50)
HDLC SERPL-MCNC: 32 MG/DL
HGB BLD-MCNC: 14 G/DL (ref 13.2–17.1)
LDLC SERPL CALC-MCNC: 71 MG/DL (CALC)
MCH RBC QN AUTO: 31.7 PG (ref 27–33)
MCHC RBC AUTO-ENTMCNC: 33 G/DL (ref 32–36)
MCV RBC AUTO: 96.1 FL (ref 80–100)
MICROALBUMIN UR-MCNC: 41 MG/DL
NONHDLC SERPL-MCNC: 97 MG/DL (CALC)
PLATELET # BLD AUTO: 177 THOUSAND/UL (ref 140–400)
PMV BLD REES-ECKER: 10 FL (ref 7.5–12.5)
POTASSIUM SERPL-SCNC: 4.3 MMOL/L (ref 3.5–5.3)
PROT SERPL-MCNC: 7 G/DL (ref 6.1–8.1)
RBC # BLD AUTO: 4.41 MILLION/UL (ref 4.2–5.8)
SODIUM SERPL-SCNC: 142 MMOL/L (ref 135–146)
TRIGL SERPL-MCNC: 189 MG/DL
TSH SERPL-ACNC: 1.08 MIU/L (ref 0.4–4.5)
VIT B12 SERPL-MCNC: 309 PG/ML (ref 200–1100)
WBC # BLD AUTO: 5.2 THOUSAND/UL (ref 3.8–10.8)

## 2025-05-31 PROCEDURE — RXMED WILLOW AMBULATORY MEDICATION CHARGE

## 2025-06-05 ENCOUNTER — PHARMACY VISIT (OUTPATIENT)
Dept: PHARMACY | Facility: CLINIC | Age: 75
End: 2025-06-05
Payer: MEDICARE

## 2025-06-16 ENCOUNTER — APPOINTMENT (OUTPATIENT)
Dept: DERMATOLOGY | Facility: CLINIC | Age: 75
End: 2025-06-16
Payer: MEDICARE

## 2025-06-16 DIAGNOSIS — L57.0 ACTINIC KERATOSIS: ICD-10-CM

## 2025-06-16 DIAGNOSIS — D18.01 HEMANGIOMA OF SKIN: ICD-10-CM

## 2025-06-16 DIAGNOSIS — L82.1 SEBORRHEIC KERATOSIS: ICD-10-CM

## 2025-06-16 DIAGNOSIS — Z85.828 HISTORY OF NONMELANOMA SKIN CANCER: ICD-10-CM

## 2025-06-16 DIAGNOSIS — D22.5 MELANOCYTIC NEVUS OF TRUNK: ICD-10-CM

## 2025-06-16 DIAGNOSIS — D48.5 NEOPLASM OF UNCERTAIN BEHAVIOR OF SKIN: Primary | ICD-10-CM

## 2025-06-16 DIAGNOSIS — L21.9 SEBORRHEIC DERMATITIS: ICD-10-CM

## 2025-06-16 DIAGNOSIS — L57.8 DIFFUSE PHOTODAMAGE OF SKIN: ICD-10-CM

## 2025-06-16 PROCEDURE — 99214 OFFICE O/P EST MOD 30 MIN: CPT | Performed by: DERMATOLOGY

## 2025-06-16 PROCEDURE — 11301 SHAVE SKIN LESION 0.6-1.0 CM: CPT | Performed by: DERMATOLOGY

## 2025-06-16 PROCEDURE — 1159F MED LIST DOCD IN RCRD: CPT | Performed by: DERMATOLOGY

## 2025-06-16 PROCEDURE — 17004 DESTROY PREMAL LESIONS 15/>: CPT | Performed by: DERMATOLOGY

## 2025-06-16 NOTE — Clinical Note
Seborrheic Dermatitis -flare on face.  The potentially chronic and intermittently flaring nature of this condition and treatment options were discussed extensively with the patient today.  At this time, we recommend topical anti-fungal therapy with Ketoconazole 2% cream, which the patient was instructed to apply twice daily to the affected areas of his face.  The risks, benefits, and side effects of this medication were discussed.  The patient expressed understanding and is in agreement with this plan.

## 2025-06-16 NOTE — Clinical Note
Porokeratosis plantaris discrete (corn) - right lateral distal foot.  The benign nature of this lesion was discussed with the patient today and reassurance provided.  We discussed several treatment options, including no treatment, the use of a doughnut pad or corn pad to alleviate pressure over the site, topical therapy, such as with salicylic acid, and destructive treatments, such as liquid nitrogen cryotherapy.  After discussing the risks and benefits of each of these options at length, the patient expressed understanding and wishes to undergo cryotherapy for this lesion in the office today.  Following treatment, he was instructed to wear a donut pad every day to minimize pressure over the site and hopefully reduce the likelihood of recurrence.  He expressed understanding, is in agreement with this plan, and wishes to proceed with cryotherapy.

## 2025-06-16 NOTE — Clinical Note
History of basal cell carcinoma and actinic keratoses and photodamage.  The signs and symptoms of skin cancer were reviewed and the patient was advised to practice sun protection and sun avoidance, use daily sunscreen, and perform regular self skin exams. We will have the patient return to our office in 4 to 6 months from the date of his last visit for routine follow-up and skin exam, and the patient was instructed to call our office should the patient notice any new, changing, symptomatic, or otherwise concerning skin lesions before then.  The patient expressed understanding and is in agreement with this plan.

## 2025-06-16 NOTE — Clinical Note
History of basal cell carcinoma and actinic keratoses and photodamage.  There is no evidence of recurrence at the site of the patient's recently treated BCC on his right upper back s/p ED&C at his last visit on 2/13/25 on exam today.  The signs and symptoms of skin cancer were reviewed and the patient was advised to practice sun protection and sun avoidance, use daily sunscreen, and perform regular self skin exams.  We will have the patient return to our office in 4-6 months, pending the above biopsy result, for routine follow-up and skin exam, and the patient was instructed to call our office should the patient notice any new, changing, symptomatic, or otherwise concerning skin lesions before then.  The patient expressed understanding and is in agreement with this plan.

## 2025-06-16 NOTE — Clinical Note
Santos Angiomas - the benign nature of these vascular lesions was discussed with the patient today and reassurance provided.  No treatment is medically indicated for these lesions at this time.

## 2025-06-16 NOTE — Clinical Note
On the plantar surface of his right lateral distal foot, there is a 5 mm pink to flesh-colored, hyperkeratotic papule with a central clearish, keratotic, tender core

## 2025-06-16 NOTE — Clinical Note
Clinically benign- to slightly atypical-appearing nevi - the clinically benign- to slightly atypical-appearing nature of the patient's nevi was discussed with the patient today.  None of the patient's nevi meet threshold for biopsy today.  We emphasized the importance of performing monthly self-skin exams using the ABCDs of monitoring moles, which were reviewed with the patient today and an informational hand-out provided.  We also emphasized the importance of sun avoidance and sun protection with daily sunscreen use.  The patient expressed understanding and is in agreement with this plan.   not applicable (Male)

## 2025-06-16 NOTE — PROGRESS NOTES
Subjective     Edward D Hume is a 75 y.o. male who presents for the following: Skin Check.  He notes dry, flaky skin on his face recently, especially around his nose, and states he has not been using ketoconazole cream.  He denies any new, changing, or concerning skin lesions since his last visit; no bleeding, itching, or burning lesions.      Review of Systems:  No other skin or systemic complaints other than what is documented elsewhere in the note.    The following portions of the chart were reviewed this encounter and updated as appropriate:       Skin Cancer History  Biopsy Log Book  Biopsied Type Location Status   12/12/24 BCC, Superficial Right Upper Back Treatment Complete  2/13/25       Additional History      Specialty Problems          Dermatology Problems    Melanocytic nevi of scalp and neck    Postoperative scar       Past Dermatologic / Past Relevant Medical History:    - history of BCC on right upper back diagnosed on 12/12/24 s/p ED&C on 2/13/25  - AKs   - seborrheic dermatitis  - no h/o atypical nevi or melanoma    Family History:    No family history of melanoma or skin cancer    Social History:    The patient is retired from working as a  and designing CT scan machines; he enjoys grilling; he recent underwent minimally invasive CABG surgery    Allergies:  Amoxicillin, Metformin, and Iodinated contrast media    Current Medications / CAM's:  Current Medications[1]     Objective   Well appearing patient in no apparent distress; mood and affect are within normal limits.    A full examination was performed including scalp, face, eyes, ears, nose, lips, neck, chest, axillae, abdomen, back, bilateral upper extremities, and bilateral lower extremities. All findings within normal limits unless otherwise noted below.    Assessment/Plan   Skin Exam  1. NEOPLASM OF UNCERTAIN BEHAVIOR OF SKIN  Right Posterior Flank  6 mm pink, shiny, scaly and crusted papule    Shave removal    Lesion length  (cm):  0.6  Lesion width (cm):  0.6  Margin per side (cm):  0  Lesion diameter (cm):  0.6  Informed consent: discussed and consent obtained    Timeout: patient name, date of birth, surgical site, and procedure verified    Procedure prep:  Patient was prepped and draped  Anesthesia: the lesion was anesthetized in a standard fashion    Anesthetic:  1% lidocaine w/ epinephrine 1-100,000 local infiltration  Instrument used: flexible razor blade    Hemostasis achieved with: aluminum chloride    Outcome: patient tolerated procedure well    Post-procedure details: sterile dressing applied and wound care instructions given    Dressing type: bandage and petrolatum    Specimen 1 - Dermatopathology- DERM LAB  Differential Diagnosis: ISK vs. Arthropod bite vs. SCC  Check Margins Yes/No?:    Comments:    Dermpath Lab: Routine Histopathology (formalin-fixed tissue)  2. ACTINIC KERATOSIS (17)  Neck - Posterior (17)  Scattered on the patient's scalp and nose, there are multiple erythematous, gritty, scaly macules  Actinic Keratosis - scattered on scalp and nose.  The pre-cancerous nature of these lesions and treatment options were discussed with the patient today.  At this time, we recommend treatment with liquid nitrogen cryotherapy.  The patient expressed understanding, is in agreement with this plan, and wishes to proceed with cryotherapy today.  Destr of lesion - Neck - Posterior (17)  Complexity: simple    Destruction method: cryotherapy    Informed consent: discussed and consent obtained    Lesion destroyed using liquid nitrogen: Yes    Cryotherapy cycles:  1  Outcome: patient tolerated procedure well with no complications    Post-procedure details: wound care instructions given    3. MELANOCYTIC NEVUS OF TRUNK  Generalized  Scattered on the patient's face, neck, trunk, and extremities, there are several small, round- to oval-shaped, brown-pigmented and pink-colored, symmetric, uniform-appearing macules and dome-shaped  papules  Clinically benign- to slightly atypical-appearing nevi - the clinically benign- to slightly atypical-appearing nature of the patient's nevi was discussed with the patient today.  None of the patient's nevi meet threshold for biopsy today.  We emphasized the importance of performing monthly self-skin exams using the ABCDs of monitoring moles, which were reviewed with the patient today and an informational hand-out provided.  We also emphasized the importance of sun avoidance and sun protection with daily sunscreen use.  The patient expressed understanding and is in agreement with this plan.  4. HEMANGIOMA OF SKIN  Generalized  Scattered on the patient's face, neck, trunk, and extremities, there are multiple small, round, cherry red- to purplish-colored, symmetric, uniform, vascular-appearing macules and papules  Cherry Angiomas - the benign nature of these vascular lesions was discussed with the patient today and reassurance provided.  No treatment is medically indicated for these lesions at this time.  5. SEBORRHEIC KERATOSIS  Generalized  Scattered on the patient's face, neck, trunk, and extremities, there are multiple tan- to light brown-colored, hyperkeratotic, stuck-on appearing papules of varying size and shape  Seborrheic Keratoses - the benign nature of these lesions was discussed with the patient today and reassurance provided.  No treatment is medically indicated for these lesions at this time.  6. SEBORRHEIC DERMATITIS  Left Ear  On the patient's face, mainly the glabella and bilateral eyebrows and perinasal creases, there are pink, scaly patches with whitish-yellowish, greasy scale  Seborrheic Dermatitis -flare on face.  The potentially chronic and intermittently flaring nature of this condition and treatment options were discussed extensively with the patient today.  At this time, we recommend topical anti-fungal therapy with Ketoconazole 2% cream, which the patient was instructed to apply twice  daily to the affected areas of his face.  The risks, benefits, and side effects of this medication were discussed.  The patient expressed understanding and is in agreement with this plan.  7. HISTORY OF NONMELANOMA SKIN CANCER  Generalized  On the patient's right upper back, there is a well-healed scar with no evidence of recurrent growth on exam today.  History of basal cell carcinoma and actinic keratoses and photodamage.  There is no evidence of recurrence at the site of the patient's recently treated BCC on his right upper back s/p ED&C at his last visit on 2/13/25 on exam today.  The signs and symptoms of skin cancer were reviewed and the patient was advised to practice sun protection and sun avoidance, use daily sunscreen, and perform regular self skin exams.  We will have the patient return to our office in 4-6 months, pending the above biopsy result, for routine follow-up and skin exam, and the patient was instructed to call our office should the patient notice any new, changing, symptomatic, or otherwise concerning skin lesions before then.  The patient expressed understanding and is in agreement with this plan.  8. DIFFUSE PHOTODAMAGE OF SKIN  Photodistributed  Diffuse photodamage with actinic changes with telangiectasia and mottled pigmentation in sun-exposed areas.  Photodamage.  The signs and symptoms of skin cancer were reviewed and the patient was advised to practice sun protection and sun avoidance, use daily sunscreen, and perform regular self skin exams.  Sun protection was discussed, including avoiding the mid-day sun, wearing a sunscreen with SPF at least 50, and stressing the need for reapplication of sunscreen and applying more than they think they need.       Rosa Staples MD  Department of Dermatology      I saw and evaluated the patient. I personally obtained the key and critical portions of the history and physical exam or was physically present for key and critical portions performed by  the resident/fellow. I reviewed the resident/fellow's documentation and discussed the patient with the resident/fellow. I agree with the resident/fellow's medical decision making as documented in the note.    Vineet Bello MD         [1]   Current Outpatient Medications:     albuterol 90 mcg/actuation inhaler, Inhale 2 puffs every 6 hours if needed for shortness of breath., Disp: 18 g, Rfl: 2    allopurinol (Zyloprim) 300 mg tablet, Take 1 tablet (300 mg) by mouth once daily., Disp: 30 tablet, Rfl: 4    amLODIPine (Norvasc) 10 mg tablet, Take 1 tablet (10 mg) by mouth once daily., Disp: 30 tablet, Rfl: 4    atorvastatin (Lipitor) 80 mg tablet, Take 1 tablet (80 mg) by mouth once daily at bedtime., Disp: 90 tablet, Rfl: 3    cranberry extract 200 mg capsule, TAKE 300 MG BY MOUTH ONCE DAILY., Disp: 90 capsule, Rfl: 0    famotidine (Pepcid) 20 mg tablet, Take 1 tablet (20 mg) by mouth once daily at bedtime., Disp: 30 tablet, Rfl: 4    gemfibrozil (Lopid) 600 mg tablet, Take 1 tablet (600 mg) by mouth 2 times a day., Disp: 60 tablet, Rfl: 2    glipiZIDE XL (Glucotrol XL) 10 mg 24 hr tablet, Take 2 tablets (20 mg) by mouth once daily., Disp: 60 tablet, Rfl: 2    insulin glargine (Basaglar KwikPen U-100 Insulin) 100 unit/mL (3 mL) pen, Inject 30 Units under the skin once daily. Take as directed per insulin instructions., Disp: 15 mL, Rfl: 7    insulin glargine (Toujeo SoloStar U-300 Insulin) 300 unit/mL (1.5 mL) pen, Inject 36 Units under the skin once daily at bedtime. Take as directed per insulin instructions., Disp: 10.8 mL, Rfl: 1    Januvia 100 mg tablet, Take 1 tablet (100 mg) by mouth once daily., Disp: 30 tablet, Rfl: 3    lidocaine 4 % cream, Apply topically 4 times a day as needed for mild pain (1 - 3)., Disp: 30 g, Rfl: 1    metoprolol tartrate (Lopressor) 25 mg tablet, Take 1 tablet (25 mg) by mouth 2 times a day., Disp: 180 tablet, Rfl: 3    omeprazole (PriLOSEC) 40 mg DR capsule, Take 1 capsule (40 mg) by  "mouth once daily in the morning. Take before meals., Disp: 30 capsule, Rfl: 4    pen needle, diabetic 32 gauge x 5/32\" needle, Use as directed to inject insulin once daily, Disp: 100 each, Rfl: 4    SITagliptin phosphate (Januvia) 100 mg tablet, Take 1 tablet (100 mg) by mouth once daily., Disp: 30 tablet, Rfl: 11    tamsulosin (Flomax) 0.4 mg 24 hr capsule, Take 1 capsule (0.4 mg) by mouth once daily., Disp: 180 capsule, Rfl: 0    valsartan-hydrochlorothiazide (Diovan-HCT) 160-12.5 mg tablet, Take 1 tablet by mouth once daily., Disp: 30 tablet, Rfl: 4    "

## 2025-06-16 NOTE — Clinical Note
On the patient's right upper back, there is a well-healed scar with no evidence of recurrent growth on exam today.

## 2025-06-19 LAB
LABORATORY COMMENT REPORT: NORMAL
PATH REPORT.FINAL DX SPEC: NORMAL
PATH REPORT.GROSS SPEC: NORMAL
PATH REPORT.RELEVANT HX SPEC: NORMAL
PATH REPORT.TOTAL CANCER: NORMAL

## 2025-06-27 ENCOUNTER — OFFICE VISIT (OUTPATIENT)
Dept: ORTHOPEDIC SURGERY | Facility: HOSPITAL | Age: 75
End: 2025-06-27
Payer: MEDICARE

## 2025-06-27 DIAGNOSIS — M65.332 TRIGGER MIDDLE FINGER OF LEFT HAND: Primary | ICD-10-CM

## 2025-06-27 PROCEDURE — 20550 NJX 1 TENDON SHEATH/LIGAMENT: CPT | Mod: LT | Performed by: ORTHOPAEDIC SURGERY

## 2025-06-27 PROCEDURE — 2500000004 HC RX 250 GENERAL PHARMACY W/ HCPCS (ALT 636 FOR OP/ED): Performed by: ORTHOPAEDIC SURGERY

## 2025-06-27 PROCEDURE — 99212 OFFICE O/P EST SF 10 MIN: CPT | Mod: 25 | Performed by: ORTHOPAEDIC SURGERY

## 2025-06-27 RX ADMIN — LIDOCAINE HYDROCHLORIDE 0.5 ML: 10 INJECTION, SOLUTION INFILTRATION; PERINEURAL at 13:09

## 2025-06-27 RX ADMIN — TRIAMCINOLONE ACETONIDE 20 MG: 40 INJECTION, SUSPENSION INTRA-ARTICULAR; INTRAMUSCULAR at 13:09

## 2025-06-27 ASSESSMENT — PAIN - FUNCTIONAL ASSESSMENT: PAIN_FUNCTIONAL_ASSESSMENT: NO/DENIES PAIN

## 2025-06-27 NOTE — PROGRESS NOTES
CHIEF COMPLAINT         Left hand pain    ASSESSMENT + PLAN     ***        HISTORY OF PRESENT ILLNESS       Patient returns today, ***      PHYSICAL EXAM       ***      IMAGING / LABS / EMGs      ***      Electronically Signed      JAVAN Wolfe MD      Orthopaedic Hand Surgery      [phone]   full, smooth motion.  Mild A1 tenderness at the long.  Good sagittal plane balance.  Normal resting cascade.  Full wrist and forearm motion.  Negative [place].  Negative midcarpal shift.  Sensation intact to light touch in all distributions.  Capillary refill less than 2 seconds.  2+ radial and ulnar pulses.      IMAGING / LABS / EMGs    None today      PROCEDURE    Hand / UE Inj/Asp: L long A1 for trigger finger on 6/27/2025 1:09 PM  Indications: therapeutic  Details: 25 G needle, volar approach  Medications: 20 mg triamcinolone acetonide 40 mg/mL; 0.5 mL lidocaine 10 mg/mL (1 %)  Outcome: tolerated well, no immediate complications  Procedure, treatment alternatives, risks and benefits explained, specific risks discussed. Consent was given by the patient.             Electronically Signed      JAVAN Wolfe MD      Orthopaedic Hand Surgery      [phone]

## 2025-06-27 NOTE — LETTER
digits intermittently.  It has not required use of the opposite hand for extension.    He does have history of gout and CKD-3.  He is type II diabetic.      PHYSICAL EXAM       He remains well-developed, well-nourished male in no acute distress.  He appears his stated age and has a pleasant affect.  Skin of left long finger and hand is intact with no erythema, ecchymosis, or diffuse swelling.  There is a little boggy fullness around the flexor tendon of the ring and a more distinct nodule at the long.  There is a trigger click with sequential extension test.  Other digits have full, smooth motion.  Mild A1 tenderness at the long.  Good sagittal plane balance.  Normal resting cascade.  Full wrist and forearm motion.  Negative [place].  Negative midcarpal shift.  Sensation intact to light touch in all distributions.  Capillary refill less than 2 seconds.  2+ radial and ulnar pulses.      IMAGING / LABS / EMGs    None today      PROCEDURE    Hand / UE Inj/Asp: L long A1 for trigger finger on 6/27/2025 1:09 PM  Indications: therapeutic  Details: 25 G needle, volar approach  Medications: 20 mg triamcinolone acetonide 40 mg/mL; 0.5 mL lidocaine 10 mg/mL (1 %)  Outcome: tolerated well, no immediate complications  Procedure, treatment alternatives, risks and benefits explained, specific risks discussed. Consent was given by the patient.             Electronically Signed      JAVAN Wolfe MD      Orthopaedic Hand Surgery      [phone]

## 2025-06-27 NOTE — Clinical Note
June 30, 2025     Roselia Ibrahim MD  [address]  [facility] [address]  [facility] [id number]    Patient: Edward D Hume   YOB: 1950   Date of Visit: 6/27/2025       Dear Dr. Roselia Ibrahim MD:    Thank you for referring Edward Hume to me for evaluation. Below are my notes for this consultation.  If you have questions, please do not hesitate to call me. I look forward to following your patient along with you.       Sincerely,     Subhash Wolfe MD      CC: No Recipients  ______________________________________________________________________________________

## 2025-07-10 PROCEDURE — RXMED WILLOW AMBULATORY MEDICATION CHARGE

## 2025-07-12 ENCOUNTER — PHARMACY VISIT (OUTPATIENT)
Dept: PHARMACY | Facility: CLINIC | Age: 75
End: 2025-07-12
Payer: MEDICARE

## 2025-07-12 PROBLEM — M65.332 TRIGGER MIDDLE FINGER OF LEFT HAND: Status: ACTIVE | Noted: 2025-07-12

## 2025-07-12 RX ORDER — TRIAMCINOLONE ACETONIDE 40 MG/ML
20 INJECTION, SUSPENSION INTRA-ARTICULAR; INTRAMUSCULAR
Status: COMPLETED | OUTPATIENT
Start: 2025-06-27 | End: 2025-06-27

## 2025-07-12 RX ORDER — LIDOCAINE HYDROCHLORIDE 10 MG/ML
0.5 INJECTION, SOLUTION INFILTRATION; PERINEURAL
Status: COMPLETED | OUTPATIENT
Start: 2025-06-27 | End: 2025-06-27

## 2025-07-24 DIAGNOSIS — I10 HYPERTENSION, UNSPECIFIED TYPE: ICD-10-CM

## 2025-07-24 RX ORDER — GEMFIBROZIL 600 MG/1
600 TABLET, FILM COATED ORAL 2 TIMES DAILY
Qty: 60 TABLET | Refills: 2 | Status: SHIPPED | OUTPATIENT
Start: 2025-07-24

## 2025-07-29 LAB
ALT SERPL-CCNC: 28 U/L (ref 9–46)
AST SERPL-CCNC: 24 U/L (ref 10–35)
CHOLEST SERPL-MCNC: 117 MG/DL
CHOLEST/HDLC SERPL: 3.3 (CALC)
HDLC SERPL-MCNC: 35 MG/DL
LDLC SERPL CALC-MCNC: 57 MG/DL (CALC)
NONHDLC SERPL-MCNC: 82 MG/DL (CALC)
TRIGL SERPL-MCNC: 174 MG/DL

## 2025-07-31 ENCOUNTER — TELEPHONE (OUTPATIENT)
Dept: PRIMARY CARE | Facility: CLINIC | Age: 75
End: 2025-07-31
Payer: MEDICARE

## 2025-07-31 DIAGNOSIS — N40.0 BENIGN PROSTATIC HYPERPLASIA, UNSPECIFIED WHETHER LOWER URINARY TRACT SYMPTOMS PRESENT: ICD-10-CM

## 2025-07-31 RX ORDER — TAMSULOSIN HYDROCHLORIDE 0.4 MG/1
0.4 CAPSULE ORAL 2 TIMES DAILY
Qty: 180 CAPSULE | Refills: 0 | Status: SHIPPED | OUTPATIENT
Start: 2025-07-31 | End: 2025-11-20

## 2025-08-05 ENCOUNTER — APPOINTMENT (OUTPATIENT)
Dept: PHARMACY | Facility: HOSPITAL | Age: 75
End: 2025-08-05
Payer: MEDICARE

## 2025-08-08 ENCOUNTER — APPOINTMENT (OUTPATIENT)
Dept: PRIMARY CARE | Facility: CLINIC | Age: 75
End: 2025-08-08
Payer: MEDICARE

## 2025-08-12 ENCOUNTER — APPOINTMENT (OUTPATIENT)
Dept: PHARMACY | Facility: HOSPITAL | Age: 75
End: 2025-08-12
Payer: MEDICARE

## 2025-08-12 DIAGNOSIS — Z79.4 TYPE 2 DIABETES MELLITUS WITH STAGE 3A CHRONIC KIDNEY DISEASE, WITH LONG-TERM CURRENT USE OF INSULIN (MULTI): Primary | ICD-10-CM

## 2025-08-12 DIAGNOSIS — E11.22 TYPE 2 DIABETES MELLITUS WITH STAGE 3A CHRONIC KIDNEY DISEASE, WITH LONG-TERM CURRENT USE OF INSULIN (MULTI): Primary | ICD-10-CM

## 2025-08-12 DIAGNOSIS — N18.31 TYPE 2 DIABETES MELLITUS WITH STAGE 3A CHRONIC KIDNEY DISEASE, WITH LONG-TERM CURRENT USE OF INSULIN (MULTI): Primary | ICD-10-CM

## 2025-08-15 ENCOUNTER — APPOINTMENT (OUTPATIENT)
Dept: PRIMARY CARE | Facility: CLINIC | Age: 75
End: 2025-08-15
Payer: MEDICARE

## 2025-08-15 VITALS
WEIGHT: 208 LBS | HEIGHT: 73 IN | HEART RATE: 61 BPM | SYSTOLIC BLOOD PRESSURE: 168 MMHG | DIASTOLIC BLOOD PRESSURE: 76 MMHG | BODY MASS INDEX: 27.57 KG/M2

## 2025-08-15 DIAGNOSIS — N18.31 STAGE 3A CHRONIC KIDNEY DISEASE (MULTI): Chronic | ICD-10-CM

## 2025-08-15 DIAGNOSIS — N18.31 TYPE 2 DIABETES MELLITUS WITH STAGE 3A CHRONIC KIDNEY DISEASE, WITH LONG-TERM CURRENT USE OF INSULIN (MULTI): Chronic | ICD-10-CM

## 2025-08-15 DIAGNOSIS — N40.0 BENIGN PROSTATIC HYPERPLASIA WITHOUT LOWER URINARY TRACT SYMPTOMS: Chronic | ICD-10-CM

## 2025-08-15 DIAGNOSIS — E11.22 TYPE 2 DIABETES MELLITUS WITH STAGE 3A CHRONIC KIDNEY DISEASE, WITH LONG-TERM CURRENT USE OF INSULIN (MULTI): Chronic | ICD-10-CM

## 2025-08-15 DIAGNOSIS — Z95.1 S/P CABG X 1: ICD-10-CM

## 2025-08-15 DIAGNOSIS — K21.9 GASTROESOPHAGEAL REFLUX DISEASE WITHOUT ESOPHAGITIS: Chronic | ICD-10-CM

## 2025-08-15 DIAGNOSIS — Z29.11 NEED FOR RSV IMMUNIZATION: Primary | ICD-10-CM

## 2025-08-15 DIAGNOSIS — I10 HYPERTENSION, UNSPECIFIED TYPE: ICD-10-CM

## 2025-08-15 DIAGNOSIS — E11.9 TYPE 2 DIABETES MELLITUS WITHOUT COMPLICATION, WITH LONG-TERM CURRENT USE OF INSULIN: ICD-10-CM

## 2025-08-15 DIAGNOSIS — M10.9 GOUT, UNSPECIFIED CAUSE, UNSPECIFIED CHRONICITY, UNSPECIFIED SITE: ICD-10-CM

## 2025-08-15 DIAGNOSIS — E78.2 MIXED HYPERLIPIDEMIA: Chronic | ICD-10-CM

## 2025-08-15 DIAGNOSIS — Z79.4 TYPE 2 DIABETES MELLITUS WITH STAGE 3A CHRONIC KIDNEY DISEASE, WITH LONG-TERM CURRENT USE OF INSULIN (MULTI): Chronic | ICD-10-CM

## 2025-08-15 DIAGNOSIS — Z79.4 TYPE 2 DIABETES MELLITUS WITHOUT COMPLICATION, WITH LONG-TERM CURRENT USE OF INSULIN: ICD-10-CM

## 2025-08-15 DIAGNOSIS — I10 PRIMARY HYPERTENSION: Chronic | ICD-10-CM

## 2025-08-15 LAB — POC HEMOGLOBIN A1C: 6.2 % (ref 4.2–6.5)

## 2025-08-15 RX ORDER — GLIPIZIDE 10 MG/1
20 TABLET, FILM COATED, EXTENDED RELEASE ORAL DAILY
Qty: 60 TABLET | Refills: 2 | Status: SHIPPED | OUTPATIENT
Start: 2025-08-15

## 2025-08-15 RX ORDER — VALSARTAN AND HYDROCHLOROTHIAZIDE 160; 12.5 MG/1; MG/1
1 TABLET, FILM COATED ORAL DAILY
Qty: 30 TABLET | Refills: 4 | Status: SHIPPED | OUTPATIENT
Start: 2025-08-15

## 2025-08-15 RX ORDER — ALLOPURINOL 300 MG/1
300 TABLET ORAL DAILY
Qty: 30 TABLET | Refills: 4 | Status: SHIPPED | OUTPATIENT
Start: 2025-08-15

## 2025-08-15 RX ORDER — METOPROLOL TARTRATE 25 MG/1
25 TABLET, FILM COATED ORAL 2 TIMES DAILY
Qty: 180 TABLET | Refills: 3 | Status: SHIPPED | OUTPATIENT
Start: 2025-08-15 | End: 2026-08-15

## 2025-08-15 RX ORDER — FAMOTIDINE 20 MG/1
20 TABLET, FILM COATED ORAL NIGHTLY
Qty: 30 TABLET | Refills: 4 | Status: SHIPPED | OUTPATIENT
Start: 2025-08-15

## 2025-08-15 RX ORDER — OMEPRAZOLE 40 MG/1
40 CAPSULE, DELAYED RELEASE ORAL
Qty: 30 CAPSULE | Refills: 4 | Status: SHIPPED | OUTPATIENT
Start: 2025-08-15

## 2025-08-15 RX ORDER — AMLODIPINE BESYLATE 10 MG/1
10 TABLET ORAL DAILY
Qty: 30 TABLET | Refills: 4 | Status: SHIPPED | OUTPATIENT
Start: 2025-08-15

## 2025-08-15 RX ORDER — ATORVASTATIN CALCIUM 80 MG/1
80 TABLET, FILM COATED ORAL NIGHTLY
Qty: 90 TABLET | Refills: 3 | Status: SHIPPED | OUTPATIENT
Start: 2025-08-15 | End: 2026-08-15

## 2025-08-15 RX ORDER — GEMFIBROZIL 600 MG/1
600 TABLET, FILM COATED ORAL 2 TIMES DAILY
Qty: 60 TABLET | Refills: 2 | Status: SHIPPED | OUTPATIENT
Start: 2025-08-15

## 2025-08-15 ASSESSMENT — ENCOUNTER SYMPTOMS
CONSTITUTIONAL NEGATIVE: 1
CARDIOVASCULAR NEGATIVE: 1
GASTROINTESTINAL NEGATIVE: 1
RESPIRATORY NEGATIVE: 1

## 2025-08-15 ASSESSMENT — PATIENT HEALTH QUESTIONNAIRE - PHQ9
2. FEELING DOWN, DEPRESSED OR HOPELESS: NOT AT ALL
1. LITTLE INTEREST OR PLEASURE IN DOING THINGS: NOT AT ALL
SUM OF ALL RESPONSES TO PHQ9 QUESTIONS 1 AND 2: 0
1. LITTLE INTEREST OR PLEASURE IN DOING THINGS: NOT AT ALL
2. FEELING DOWN, DEPRESSED OR HOPELESS: NOT AT ALL
SUM OF ALL RESPONSES TO PHQ9 QUESTIONS 1 AND 2: 0

## 2025-08-15 ASSESSMENT — LIFESTYLE VARIABLES
AUDIT-C TOTAL SCORE: 1
HOW OFTEN DO YOU HAVE A DRINK CONTAINING ALCOHOL: MONTHLY OR LESS
HOW OFTEN DO YOU HAVE SIX OR MORE DRINKS ON ONE OCCASION: NEVER
SKIP TO QUESTIONS 9-10: 1
HOW MANY STANDARD DRINKS CONTAINING ALCOHOL DO YOU HAVE ON A TYPICAL DAY: 1 OR 2

## 2025-08-15 ASSESSMENT — ACTIVITIES OF DAILY LIVING (ADL)
TAKING_MEDICATION: INDEPENDENT
MANAGING_FINANCES: INDEPENDENT
BATHING: INDEPENDENT
GROCERY_SHOPPING: INDEPENDENT
DRESSING: INDEPENDENT
DOING_HOUSEWORK: INDEPENDENT

## 2025-08-29 PROCEDURE — RXMED WILLOW AMBULATORY MEDICATION CHARGE

## 2025-09-02 ENCOUNTER — PHARMACY VISIT (OUTPATIENT)
Dept: PHARMACY | Facility: CLINIC | Age: 75
End: 2025-09-02
Payer: MEDICARE

## 2025-09-10 ENCOUNTER — APPOINTMENT (OUTPATIENT)
Dept: CARDIOLOGY | Facility: HOSPITAL | Age: 75
End: 2025-09-10
Payer: MEDICARE

## 2025-09-30 ENCOUNTER — APPOINTMENT (OUTPATIENT)
Dept: PHARMACY | Facility: HOSPITAL | Age: 75
End: 2025-09-30
Payer: MEDICARE

## 2025-11-14 ENCOUNTER — APPOINTMENT (OUTPATIENT)
Dept: PRIMARY CARE | Facility: CLINIC | Age: 75
End: 2025-11-14
Payer: MEDICARE

## 2025-12-15 ENCOUNTER — APPOINTMENT (OUTPATIENT)
Dept: DERMATOLOGY | Facility: CLINIC | Age: 75
End: 2025-12-15
Payer: MEDICARE

## (undated) DEVICE — SUTURE, ETHIBOND, XTRA, 30 IN, 0, CT-1, GREEN

## (undated) DEVICE — FILTER, IV, BLOOD, MICROAGGREGATE, 40 MIC, RBC TRANSFUSION

## (undated) DEVICE — DRESSING, WOUND, ALLEVYN LIFE, SACRUM, 17.2 X 17.5 CM

## (undated) DEVICE — DEVICE KIT, COR-KNOT MICRO

## (undated) DEVICE — TR BAND, RADIAL COMPRESSION, STANDARD, 24CM

## (undated) DEVICE — CANNULA, CARDIAC SUMP

## (undated) DEVICE — PAD, GROUNDING, ELECTROSURGICAL, W/9 FT CABLE, POLYHESIVE II, ADULT, LF

## (undated) DEVICE — CLIP, SPRING, BULLDOG, FOGARTY, SOFT JAW, 6 MM, LF

## (undated) DEVICE — SUTURE, PROLENE, 4-0, 36 IN, RB1, DA, BLUE

## (undated) DEVICE — TIP,  ELECTRODE COATED INSULATED, EXTENDED, LF

## (undated) DEVICE — CATHETER, PIGTAIL 155 MOD DIAGNOSTIC, 4 FR (110 CM)

## (undated) DEVICE — INSERT, CLAMP, SURGICAL, SOFT/TRACTION, STEALTH, 5 MM

## (undated) DEVICE — SYSTEM, OCTOPUS NUVO TISSUE STABILIZER

## (undated) DEVICE — DRESSING, ADHESIVE, ISLAND, TELFA, 2 X 3.75 IN, LF

## (undated) DEVICE — SUTURE, VICRYL 2-0, TAPER POINT, CT-1 UNDYED 27 INCH

## (undated) DEVICE — SUTURE, PROLENE, 6-0, 30 IN, C-1, CV-11, BLUE

## (undated) DEVICE — SUTURE, PROLENE 4-0, TAPER POINT, SH-1 BLUE 30 INCH

## (undated) DEVICE — SUTURE, VICRYL, 0, 36 IN, CT-1, UNDYED

## (undated) DEVICE — KNIFE, OPHTHALMIC, SLIT, 22.5 DEG

## (undated) DEVICE — CATHETER, DIAGNOSTIC, 4 FR-JR 4

## (undated) DEVICE — SUTURE, MONOCRYL, 4-0, 18 IN, PS2, UNDYED

## (undated) DEVICE — SUTURE, ETHIBOND XTRA, 5 V-37, GRN/BR, LF

## (undated) DEVICE — SUTURE, PROLENE, 5-0, 36 IN, C-1, CV-11, BLUE

## (undated) DEVICE — DRAPE, SHEET, CARDIOVASCULAR, ANTIMICROBIAL, W/ANESTHESIA SCREEN, IOBAN 2, STERI DRAPE, 107 X 133 IN, DISPOSABLE, FABRIC, BLUE, STERILE

## (undated) DEVICE — DRAPE, INSTRUMENT, W/POUCH, STERI DRAPE, 9 5/8 X 18 LONG

## (undated) DEVICE — DRAPE, INSTRUMENT, W/POUCH, STERI DRAPE, 7 X 11 IN, DISPOSABLE, STERILE

## (undated) DEVICE — SUTURE, PROLENE, 7-0, 30 IN, BV1, DA, BLUE

## (undated) DEVICE — ELECTRODE, ELECTROSURGICAL, BLADE, STANDARD, 2.75 IN

## (undated) DEVICE — CLEANER, ELECTROSURGICAL, TIP, 5 X 5 CM, LF

## (undated) DEVICE — PERFUSION SERVICES

## (undated) DEVICE — SUTURE, MONOCRYL, 3-0, 27 IN, PS-2, UNDYED

## (undated) DEVICE — INTRODUCER, GLIDESHEATH SLENDER A-KIT, 5FR 10CM

## (undated) DEVICE — SHUNT, INTRACORONARY, 2.5 MM, CLEARVIEW

## (undated) DEVICE — SUTURE, NUROLON, 0, 18 IN, CT1, DETACHABLE, MULTIPACK, BLACK

## (undated) DEVICE — GEL, ULTRASOUND, AQUASONIC 100, 20 GM, STERILE

## (undated) DEVICE — DRESSING, ISLAND, TELFA, 4 X 5 IN

## (undated) DEVICE — BLOWER MISTER KIT, CLEARVIEW, MALLEABLE SHAFT, W/TUBING, 16.5 CM

## (undated) DEVICE — INTRODUCER, GLIDESHEATH SLENDER A-KIT, 6FR 10CM

## (undated) DEVICE — DRESSING, ALLEVYN, GENTAL LITE BORDER, 2 X 2

## (undated) DEVICE — DRAPE, FLUID WARMER

## (undated) DEVICE — ELECTRODE, ELECTROSURGICAL, BLADE EXT 4 INCH, INSULATED

## (undated) DEVICE — Device

## (undated) DEVICE — COVER HANDLE LIGHT, STERIS, BLUE, STERILE

## (undated) DEVICE — CATHETER, ANGIO, IMPULSE, FL3.5, 5 FR X 100 CM

## (undated) DEVICE — SUTURE, ETHIBOND XTRA, 5 CCS, GRN/BR, LF

## (undated) DEVICE — COLLECTION UNIT, DRAINAGE, THORACIC, SINGLE TUBE, DRY SUCTION, ATS COMPATIBLE, OASIS 3600, LF

## (undated) DEVICE — SOLUTION, IRRIGATION, SODIUM CHLORIDE 0.9%, 1000 ML, POUR BOTTLE

## (undated) DEVICE — GUIDEWIRE, INQUIRE, J TIP, .035 X 210CM, FIXED CORE, DIAGNOSTIC

## (undated) DEVICE — SUTURE, PROLENE, 3-0, 36 IN, SH, DA, BLUE